# Patient Record
Sex: FEMALE | Race: WHITE | NOT HISPANIC OR LATINO | Employment: FULL TIME | URBAN - METROPOLITAN AREA
[De-identification: names, ages, dates, MRNs, and addresses within clinical notes are randomized per-mention and may not be internally consistent; named-entity substitution may affect disease eponyms.]

---

## 2019-08-13 ENCOUNTER — OFFICE VISIT (OUTPATIENT)
Dept: INTERNAL MEDICINE | Facility: CLINIC | Age: 49
End: 2019-08-13
Payer: COMMERCIAL

## 2019-08-13 ENCOUNTER — PATIENT MESSAGE (OUTPATIENT)
Dept: INTERNAL MEDICINE | Facility: CLINIC | Age: 49
End: 2019-08-13

## 2019-08-13 ENCOUNTER — HOSPITAL ENCOUNTER (OUTPATIENT)
Dept: RADIOLOGY | Facility: HOSPITAL | Age: 49
Discharge: HOME | End: 2019-08-13
Attending: NURSE PRACTITIONER
Payer: COMMERCIAL

## 2019-08-13 VITALS
DIASTOLIC BLOOD PRESSURE: 60 MMHG | OXYGEN SATURATION: 98 % | HEART RATE: 72 BPM | BODY MASS INDEX: 31.64 KG/M2 | SYSTOLIC BLOOD PRESSURE: 115 MMHG | HEIGHT: 63 IN | WEIGHT: 178.56 LBS

## 2019-08-13 DIAGNOSIS — S69.92XA INJURY OF LEFT HAND, INITIAL ENCOUNTER: ICD-10-CM

## 2019-08-13 DIAGNOSIS — M79.642 LEFT HAND PAIN: ICD-10-CM

## 2019-08-13 DIAGNOSIS — E66.09 CLASS 1 OBESITY DUE TO EXCESS CALORIES WITHOUT SERIOUS COMORBIDITY WITH BODY MASS INDEX (BMI) OF 30.0 TO 30.9 IN ADULT: ICD-10-CM

## 2019-08-13 DIAGNOSIS — Z00.00 ANNUAL PHYSICAL EXAM: Primary | ICD-10-CM

## 2019-08-13 DIAGNOSIS — I10 HYPERTENSION, UNSPECIFIED TYPE: ICD-10-CM

## 2019-08-13 DIAGNOSIS — S62.301A CLOSED DISPLACED FRACTURE OF SECOND METACARPAL BONE OF LEFT HAND, UNSPECIFIED PORTION OF METACARPAL, INITIAL ENCOUNTER: ICD-10-CM

## 2019-08-13 DIAGNOSIS — N80.9 ENDOMETRIOSIS: ICD-10-CM

## 2019-08-13 LAB
BILIRUB UR QL STRIP: NEGATIVE
CLARITY UR REFRACT.AUTO: CLEAR
COLOR UR AUTO: COLORLESS
GLUCOSE UR QL STRIP: NEGATIVE
HGB UR QL STRIP: NEGATIVE
KETONES UR QL STRIP: NEGATIVE
LEUKOCYTE ESTERASE UR QL STRIP: NEGATIVE
NITRITE UR QL STRIP: NEGATIVE
PH UR STRIP: >8 [PH] (ref 5–8)
PROT UR QL STRIP: NEGATIVE
SP GR UR STRIP: 1 (ref 1–1.03)
URN SPEC COLLECT METH UR: ABNORMAL

## 2019-08-13 PROCEDURE — 73130 X-RAY EXAM OF HAND: CPT | Mod: TC,LT

## 2019-08-13 PROCEDURE — 73130 XR HAND COMPLETE 3 VIEW LEFT: ICD-10-PCS | Mod: 26,LT,, | Performed by: RADIOLOGY

## 2019-08-13 PROCEDURE — 99386 PR PREVENTIVE VISIT,NEW,40-64: ICD-10-PCS | Mod: S$GLB,,, | Performed by: NURSE PRACTITIONER

## 2019-08-13 PROCEDURE — 81003 URINALYSIS AUTO W/O SCOPE: CPT

## 2019-08-13 PROCEDURE — 99999 PR PBB SHADOW E&M-EST. PATIENT-LVL IV: CPT | Mod: PBBFAC,,, | Performed by: NURSE PRACTITIONER

## 2019-08-13 PROCEDURE — 99386 PREV VISIT NEW AGE 40-64: CPT | Mod: S$GLB,,, | Performed by: NURSE PRACTITIONER

## 2019-08-13 PROCEDURE — 99999 PR PBB SHADOW E&M-EST. PATIENT-LVL IV: ICD-10-PCS | Mod: PBBFAC,,, | Performed by: NURSE PRACTITIONER

## 2019-08-13 PROCEDURE — 73130 X-RAY EXAM OF HAND: CPT | Mod: 26,LT,, | Performed by: RADIOLOGY

## 2019-08-13 RX ORDER — PROPRANOLOL HYDROCHLORIDE 40 MG/1
40 TABLET ORAL DAILY
Refills: 1 | COMMUNITY
Start: 2019-07-10 | End: 2019-08-13 | Stop reason: SDUPTHER

## 2019-08-13 RX ORDER — PROPRANOLOL HYDROCHLORIDE 40 MG/1
20 TABLET ORAL DAILY
Qty: 30 TABLET | Refills: 11 | Status: SHIPPED | OUTPATIENT
Start: 2019-08-13 | End: 2019-08-13 | Stop reason: SDUPTHER

## 2019-08-13 RX ORDER — PROPRANOLOL HYDROCHLORIDE 20 MG/1
20 TABLET ORAL DAILY
Qty: 30 TABLET | Refills: 11 | Status: SHIPPED | OUTPATIENT
Start: 2019-08-13 | End: 2020-08-18 | Stop reason: SDUPTHER

## 2019-08-13 RX ORDER — ESTRADIOL 1 MG/1
1 TABLET ORAL DAILY
Refills: 4 | COMMUNITY
Start: 2019-07-22 | End: 2022-11-09

## 2019-08-13 RX ORDER — ERGOCALCIFEROL 1.25 MG/1
50000 CAPSULE ORAL
Refills: 2 | COMMUNITY
Start: 2019-07-19 | End: 2019-10-14 | Stop reason: SDUPTHER

## 2019-08-13 RX ORDER — CALCIUM CARBONATE 600 MG
600 TABLET ORAL ONCE
COMMUNITY

## 2019-08-13 NOTE — PROGRESS NOTES
Internal Medicine Annual Exam       CHIEF COMPLAINT     The patient, Krystle Austin, who is a 48 y.o. female presents for an annual exam.    HPI   Will request records from Dr García's office - last PCP    Rc Cheng - takes 50,000 weekly. And oscal. Started by Dr García     HTN- dx 2 years ago on propranolol. No family history     Sleep- 6-8 hours   Stress- son with learning diabilities started new school.   Exercise- recently stopped 2/2 heat - walks   Diet- Good, trying to eat healthy. Stopped soft drinks and coffee     Headaches- every other day. Through out the day. Located band like across the forehead. No n/v or photo/phonophobia. Started diet 6/1/2019 has lost 23 lbs.     Fell this weekend- left hand pain. Fell onto outstretched hand. +pain in the left first metacarpal joint. +swelling. Full ROM.     HM-  MMG- has dense breasts, ordered q 6 months. Last performed 7/9/2019. Diagnostic imaging. Ordered by Dr Tapia - Gyn   Tdap- unsure will be in records           Past Medical History:  History reviewed. No pertinent past medical history.    Past Surgical History:   Procedure Laterality Date    HYSTERECTOMY  05/2008    TONSILLECTOMY  All I can remember is I was in 4th grade.        Family History   Problem Relation Age of Onset    Alcohol abuse Maternal Grandmother     Cancer Maternal Grandmother     Asthma Son     Cancer Mother     COPD Mother 61        lung     Learning disabilities Son     Miscarriages / Stillbirths Sister         Social History     Socioeconomic History    Marital status:      Spouse name: Not on file    Number of children: Not on file    Years of education: Not on file    Highest education level: Not on file   Occupational History    Not on file   Social Needs    Financial resource strain: Not hard at all    Food insecurity:     Worry: Never true     Inability: Never true    Transportation needs:     Medical: No     Non-medical: No   Tobacco Use    Smoking status: Never  Smoker    Smokeless tobacco: Never Used   Substance and Sexual Activity    Alcohol use: Not Currently     Frequency: Monthly or less     Drinks per session: 1 or 2     Binge frequency: Never     Comment: I probably have 2 drinks 3 times in a calendar year.    Drug use: Never    Sexual activity: Yes     Partners: Male     Birth control/protection: None   Lifestyle    Physical activity:     Days per week: 5 days     Minutes per session: 30 min    Stress: Not at all   Relationships    Social connections:     Talks on phone: More than three times a week     Gets together: Once a week     Attends Mu-ism service: Not on file     Active member of club or organization: Yes     Attends meetings of clubs or organizations: More than 4 times per year     Relationship status:    Other Topics Concern    Not on file   Social History Narrative    Not on file        Social History     Tobacco Use   Smoking Status Never Smoker   Smokeless Tobacco Never Used        Allergies as of 08/13/2019 - Reviewed 08/13/2019   Allergen Reaction Noted    Oxycodone-acetaminophen Hives 08/13/2019          Home Medications:  Prior to Admission medications    Medication Sig Start Date End Date Taking? Authorizing Provider   calcium carbonate (OS-STEFANIE) 600 mg calcium (1,500 mg) Tab Take 600 mg by mouth once.   Yes Historical Provider, MD   ergocalciferol (ERGOCALCIFEROL) 50,000 unit Cap Take 50,000 Units by mouth every 7 days. 7/19/19  Yes Historical Provider, MD   estradiol (ESTRACE) 1 MG tablet Take 1 mg by mouth once daily. 7/22/19  Yes Historical Provider, MD   levocetirizine dihydrochloride (XYZAL ORAL) Take by mouth.   Yes Historical Provider, MD   multivit-min/iron/folic/fdl726 (HAIR, SKIN AND NAILS ADVANCED ORAL) Take by mouth.   Yes Historical Provider, MD   multivitamin with minerals tablet Take 1 tablet by mouth once daily.   Yes Historical Provider, MD   propranolol (INDERAL) 40 MG tablet Take 40 mg by mouth once daily.  "7/10/19  Yes Historical Provider, MD       Review of Systems:  Review of Systems   Constitutional: Positive for activity change. Negative for unexpected weight change.   HENT: Negative for hearing loss, rhinorrhea and trouble swallowing.    Eyes: Negative for discharge and visual disturbance.   Respiratory: Negative for cough, chest tightness, shortness of breath and wheezing.    Cardiovascular: Negative for chest pain and palpitations.   Gastrointestinal: Negative for abdominal pain, blood in stool, constipation, diarrhea and vomiting.   Endocrine: Negative for polydipsia and polyuria.   Genitourinary: Negative for difficulty urinating, dysuria, hematuria and menstrual problem.   Musculoskeletal: Negative for arthralgias, joint swelling and neck pain.   Skin: Negative for rash.   Neurological: Negative for dizziness, weakness, light-headedness and headaches.   Psychiatric/Behavioral: Negative for confusion, dysphoric mood and sleep disturbance. The patient is nervous/anxious (son starting new school - was home schooled since 3/2019).        Health Maintainence:   Immunizations:  Health Maintenance       Date Due Completion Date    Lipid Panel 1970 ---    TETANUS VACCINE 11/29/1989 ---    Mammogram 11/29/2011 ---    Influenza Vaccine (1) 08/01/2019 ---           PHYSICAL EXAM     /70 (BP Location: Left arm, Patient Position: Sitting, BP Method: Medium (Manual))   Pulse 72   Ht 5' 3" (1.6 m)   Wt 81 kg (178 lb 9.2 oz)   SpO2 98%   BMI 31.63 kg/m²  Body mass index is 31.63 kg/m².    Physical Exam   Constitutional: She is oriented to person, place, and time. She appears well-developed and well-nourished.   HENT:   Head: Normocephalic.   Right Ear: External ear normal.   Left Ear: External ear normal.   Nose: Nose normal.   Mouth/Throat: Oropharynx is clear and moist. No oropharyngeal exudate.   Eyes: Pupils are equal, round, and reactive to light.   Neck: Neck supple. No JVD present. No tracheal " deviation present. No thyromegaly present.   Cardiovascular: Normal rate, regular rhythm, normal heart sounds and intact distal pulses. Exam reveals no gallop and no friction rub.   No murmur heard.  Pulmonary/Chest: Effort normal and breath sounds normal. No respiratory distress. She has no wheezes. She has no rales.   Abdominal: Soft. Bowel sounds are normal. She exhibits no distension. There is no tenderness.   Musculoskeletal: Normal range of motion. She exhibits no edema or tenderness.   Lymphadenopathy:     She has no cervical adenopathy.   Neurological: She is alert and oriented to person, place, and time.   Skin: Skin is warm and dry. No rash noted.   Psychiatric: She has a normal mood and affect. Her behavior is normal.   Vitals reviewed.      LABS     No results found for: LABA1C, HGBA1C  CMP  No results found for: NA, K, CL, CO2, GLU, BUN, CREATININE, CALCIUM, PROT, ALBUMIN, BILITOT, ALKPHOS, AST, ALT, ANIONGAP, ESTGFRAFRICA, EGFRNONAA  No results found for: WBC, HGB, HCT, MCV, PLT  No results found for: CHOL  No results found for: HDL  No results found for: LDLCALC  No results found for: TRIG  No results found for: CHOLHDL  No results found for: TSH, M7OHIUJ, V1ICYEB, THYROIDAB    ASSESSMENT/PLAN     Krystle Austin is a 48 y.o. female with  History reviewed. No pertinent past medical history.    Annual physical exam- all age and gender related screenings. Flu shot this fall.   -     CBC auto differential; Future; Expected date: 08/13/2019  -     Comprehensive metabolic panel; Future; Expected date: 08/13/2019  -     TSH; Future; Expected date: 08/13/2019  -     T4, free; Future; Expected date: 08/13/2019  -     Urinalysis  -     Vitamin D; Future; Expected date: 08/13/2019  -     Lipid panel; Future; Expected date: 08/13/2019    Hypertension, unspecified type- will decrease propranolol to 20mg. continue weight loss and exercise.   -     propranolol (INDERAL) 40 MG tablet; Take 0.5 tablets (20 mg total)  by mouth once daily.  Dispense: 30 tablet; Refill: 11    Endometriosis- stable. S/p total hysto     Injury of left hand, initial encounter- will send for images.   -     X-Ray Hand 3 View Left; Future; Expected date: 08/13/2019    Left hand pain  -     X-Ray Hand 3 View Left; Future; Expected date: 08/13/2019    Class 1 obesity due to excess calories without serious comorbidity with body mass index (BMI) of 30.0 to 30.9 in adult- cont weight loss with diet and exercise     Left partially displaced fracture of the 2nd metacarpal - will place in splint and refer to ortho.     Follow up with PCP in 4 weeks for BP check on new meds.     Caitlin WALKER, ANIL, FNP-c   Department of Internal Medicine - [facility]  7:58 AM

## 2019-08-16 ENCOUNTER — TELEPHONE (OUTPATIENT)
Dept: ORTHOPEDICS | Facility: CLINIC | Age: 49
End: 2019-08-16

## 2019-08-20 ENCOUNTER — OFFICE VISIT (OUTPATIENT)
Dept: ORTHOPEDICS | Facility: CLINIC | Age: 49
End: 2019-08-20
Payer: COMMERCIAL

## 2019-08-20 ENCOUNTER — DOCUMENTATION ONLY (OUTPATIENT)
Dept: ORTHOPEDICS | Facility: CLINIC | Age: 49
End: 2019-08-20

## 2019-08-20 ENCOUNTER — HOSPITAL ENCOUNTER (OUTPATIENT)
Dept: RADIOLOGY | Facility: OTHER | Age: 49
Discharge: HOME | End: 2019-08-20
Attending: PHYSICIAN ASSISTANT
Payer: COMMERCIAL

## 2019-08-20 VITALS
DIASTOLIC BLOOD PRESSURE: 74 MMHG | BODY MASS INDEX: 31.54 KG/M2 | HEIGHT: 63 IN | WEIGHT: 178 LBS | SYSTOLIC BLOOD PRESSURE: 118 MMHG | HEART RATE: 51 BPM

## 2019-08-20 DIAGNOSIS — S62.361A CLOSED NONDISPLACED FRACTURE OF NECK OF SECOND METACARPAL BONE OF LEFT HAND, INITIAL ENCOUNTER: Primary | ICD-10-CM

## 2019-08-20 DIAGNOSIS — S62.361A CLOSED NONDISPLACED FRACTURE OF NECK OF SECOND METACARPAL BONE OF LEFT HAND, INITIAL ENCOUNTER: ICD-10-CM

## 2019-08-20 PROCEDURE — 99203 OFFICE O/P NEW LOW 30 MIN: CPT | Mod: 25,S$GLB,, | Performed by: PHYSICIAN ASSISTANT

## 2019-08-20 PROCEDURE — 99999 PR PBB SHADOW E&M-EST. PATIENT-LVL IV: ICD-10-PCS | Mod: PBBFAC,,, | Performed by: PHYSICIAN ASSISTANT

## 2019-08-20 PROCEDURE — 73130 XR HAND COMPLETE 3 VIEW LEFT: ICD-10-PCS | Mod: 26,LT,, | Performed by: RADIOLOGY

## 2019-08-20 PROCEDURE — 99203 PR OFFICE/OUTPT VISIT, NEW, LEVL III, 30-44 MIN: ICD-10-PCS | Mod: 25,S$GLB,, | Performed by: PHYSICIAN ASSISTANT

## 2019-08-20 PROCEDURE — 3008F PR BODY MASS INDEX (BMI) DOCUMENTED: ICD-10-PCS | Mod: CPTII,S$GLB,, | Performed by: PHYSICIAN ASSISTANT

## 2019-08-20 PROCEDURE — 73130 X-RAY EXAM OF HAND: CPT | Mod: TC,FY,LT

## 2019-08-20 PROCEDURE — 73130 X-RAY EXAM OF HAND: CPT | Mod: 26,LT,, | Performed by: RADIOLOGY

## 2019-08-20 PROCEDURE — 29085 APPL CAST HAND&LWR FOREARM: CPT | Mod: LT,S$GLB,, | Performed by: PHYSICIAN ASSISTANT

## 2019-08-20 PROCEDURE — 99999 PR PBB SHADOW E&M-EST. PATIENT-LVL IV: CPT | Mod: PBBFAC,,, | Performed by: PHYSICIAN ASSISTANT

## 2019-08-20 PROCEDURE — 29085 PR APPLY HAND/WRIST CAST: ICD-10-PCS | Mod: LT,S$GLB,, | Performed by: PHYSICIAN ASSISTANT

## 2019-08-20 PROCEDURE — 3008F BODY MASS INDEX DOCD: CPT | Mod: CPTII,S$GLB,, | Performed by: PHYSICIAN ASSISTANT

## 2019-08-20 NOTE — PROGRESS NOTES
Subjective:      Patient ID: Krystle Austin is a 48 y.o. female.    Chief Complaint: Pain of the Left Hand      HPI  Krystle Austin is a right hand dominant 48 y.o. female presenting today for left hand pain.  There was a history of trauma- she was dancing at a reunion and fell back, landing on the left hand.  Onset of symptoms began 9 days ago.  She was seen by primary care last week, underwent left hand x-ray.  Fracture was noted of the 2nd metacarpal head, she was placed in a TKO radial gutter brace.  She reports that she has been wearing the brace regularly she does take it off for cooking, occasionally for typing at work, and for bathing and showering.  She reports that her pain is mild, improved with ibuprofen and ice.  She also elevates the hand regularly.  She denies any finger numbness or tingling.      Review of patient's allergies indicates:   Allergen Reactions    Oxycodone-acetaminophen Hives         Current Outpatient Medications   Medication Sig Dispense Refill    calcium carbonate (OS-STEFANIE) 600 mg calcium (1,500 mg) Tab Take 600 mg by mouth once.      ergocalciferol (ERGOCALCIFEROL) 50,000 unit Cap Take 50,000 Units by mouth every 7 days.  2    estradiol (ESTRACE) 1 MG tablet Take 1 mg by mouth once daily.  4    levocetirizine dihydrochloride (XYZAL ORAL) Take by mouth.      multivit-min/iron/folic/fqd580 (HAIR, SKIN AND NAILS ADVANCED ORAL) Take by mouth.      multivitamin with minerals tablet Take 1 tablet by mouth once daily.      propranolol (INDERAL) 20 MG tablet Take 1 tablet (20 mg total) by mouth once daily. 30 tablet 11     No current facility-administered medications for this visit.        Past Medical History:   Diagnosis Date    Endometriosis     Hypertension        Past Surgical History:   Procedure Laterality Date    HYSTERECTOMY  05/2008    TONSILLECTOMY  All I can remember is I was in 4th grade.         Review of Systems:  Review of Systems   Constitution: Negative for  "chills and fever.   Skin: Negative for rash and suspicious lesions.   Musculoskeletal:        See HPI   Neurological: Negative for dizziness, headaches, light-headedness, numbness and paresthesias.   Psychiatric/Behavioral: Negative for depression. The patient is not nervous/anxious.          OBJECTIVE:     PHYSICAL EXAM:  Height: 5' 3" (160 cm) Weight: 80.7 kg (178 lb)  Vitals:    08/20/19 0836   BP: 118/74   Pulse: (!) 51   Weight: 80.7 kg (178 lb)   Height: 5' 3" (1.6 m)   PainSc:   3     General    Vitals reviewed.  Constitutional: She is oriented to person, place, and time. She appears well-developed and well-nourished.   HENT:   Head: Normocephalic and atraumatic.   Neck: Normal range of motion.   Cardiovascular: Normal rate.    Pulmonary/Chest: Effort normal. No respiratory distress.   Neurological: She is alert and oriented to person, place, and time.   Psychiatric: She has a normal mood and affect. Her behavior is normal. Judgment and thought content normal.             Musculoskeletal:  Mild ecchymosis diffusely over the dorsal left hand.  No lacerations or abrasions.  She is nontender to palpation today, including over the fracture site. Good finger and wrist range of motion. Neurovascularly intact-good sensation and motor function, good capillary refill, 2+ radial pulses.    RADIOGRAPHS:  Left Hand XRay, 8/20/19  FINDINGS:  Healing fracture of the head of the 2nd metacarpal bone identified in a satisfactory position and alignment      Impression       See above     Left Hand XRay, 8/13/19  FINDINGS:  There is a minimally displaced fracture at the 2nd metacarpal head with associated soft tissue swelling.  No intra-articular extension.  The remaining osseous structures, soft tissues and joint spaces are normal.      Impression     Minimally displaced 2nd metacarpal head fracture.     Comments: I have personally reviewed the imaging and I agree with the above radiologist's report.    ASSESSMENT/PLAN: "   Krystle was seen today for pain.    Diagnoses and all orders for this visit:    Closed nondisplaced fracture of neck of second metacarpal bone of left hand, initial encounter  -     X-Ray Hand 3 View Left; Future           - We talked at length about the anatomy and pathophysiology of   Encounter Diagnosis   Name Primary?    Closed nondisplaced fracture of neck of second metacarpal bone of left hand, initial encounter Yes       - repeat hand x-ray today, reviewed and in stable position  - patient placed in a radial gutter fiberglass cast, IP position, left hand and forearm  - follow-up in 2 weeks for cast check and repeat x-ray  - no lifting or weight-bearing left hand  - Tylenol for mild pain, tramadol for moderate to severe pain  - continue icing and elevating.  - call with questions or concerns    Disclaimer: This note has been generated using voice-recognition software. There may be typographical errors that have been missed during proof-reading.

## 2019-08-20 NOTE — LETTER
August 20, 2019      Caitlin King, NP  [address]  [facility] [address]           [facility] 9 [facility] [address]  [address], [address]  [facility] [address]  Phone: [phone]          Patient: Krystle Austin   MR Number: [id number]   YOB: 1970   Date of Visit: 8/20/2019       Dear Caitlin King:    Thank you for referring Krystle Austin to me for evaluation. Attached you will find relevant portions of my assessment and plan of care.    If you have questions, please do not hesitate to call me. I look forward to following Krystle Austin along with you.    Sincerely,    GLYNN Nicole    Enclosure  CC:  No Recipients    If you would like to receive this communication electronically, please contact [email or web address] or [phone] to request more information on [employer] Link access.    For providers and/or their staff who would like to refer a patient to [facility], please contact us through our one-stop-shop provider referral line, [facility], at [phone].    If you feel you have received this communication in error or would no longer like to receive these types of communications, please e-mail [email or web address]

## 2019-08-27 ENCOUNTER — DOCUMENTATION ONLY (OUTPATIENT)
Dept: ORTHOPEDICS | Facility: CLINIC | Age: 49
End: 2019-08-27

## 2019-08-30 ENCOUNTER — TELEPHONE (OUTPATIENT)
Dept: ORTHOPEDICS | Facility: CLINIC | Age: 49
End: 2019-08-30

## 2019-09-04 ENCOUNTER — HOSPITAL ENCOUNTER (OUTPATIENT)
Dept: RADIOLOGY | Facility: OTHER | Age: 49
Discharge: HOME | End: 2019-09-04
Attending: PHYSICIAN ASSISTANT
Payer: COMMERCIAL

## 2019-09-04 ENCOUNTER — OFFICE VISIT (OUTPATIENT)
Dept: ORTHOPEDICS | Facility: CLINIC | Age: 49
End: 2019-09-04
Payer: COMMERCIAL

## 2019-09-04 VITALS
WEIGHT: 178 LBS | HEART RATE: 60 BPM | DIASTOLIC BLOOD PRESSURE: 79 MMHG | HEIGHT: 63 IN | BODY MASS INDEX: 31.54 KG/M2 | SYSTOLIC BLOOD PRESSURE: 122 MMHG

## 2019-09-04 DIAGNOSIS — S62.361A CLOSED NONDISPLACED FRACTURE OF NECK OF SECOND METACARPAL BONE OF LEFT HAND, INITIAL ENCOUNTER: Primary | ICD-10-CM

## 2019-09-04 DIAGNOSIS — S62.361A CLOSED NONDISPLACED FRACTURE OF NECK OF SECOND METACARPAL BONE OF LEFT HAND, INITIAL ENCOUNTER: ICD-10-CM

## 2019-09-04 PROCEDURE — 97760 PR ORTHOTIC MGMT&TRAINJ INITIAL ENC EA 15 MINS: ICD-10-PCS | Mod: S$GLB,,, | Performed by: PHYSICIAN ASSISTANT

## 2019-09-04 PROCEDURE — 3008F PR BODY MASS INDEX (BMI) DOCUMENTED: ICD-10-PCS | Mod: CPTII,S$GLB,, | Performed by: PHYSICIAN ASSISTANT

## 2019-09-04 PROCEDURE — 99999 PR PBB SHADOW E&M-EST. PATIENT-LVL IV: CPT | Mod: PBBFAC,,, | Performed by: PHYSICIAN ASSISTANT

## 2019-09-04 PROCEDURE — 73130 X-RAY EXAM OF HAND: CPT | Mod: TC,FY,LT

## 2019-09-04 PROCEDURE — 99999 PR PBB SHADOW E&M-EST. PATIENT-LVL IV: ICD-10-PCS | Mod: PBBFAC,,, | Performed by: PHYSICIAN ASSISTANT

## 2019-09-04 PROCEDURE — 73130 XR HAND COMPLETE 3 VIEW LEFT: ICD-10-PCS | Mod: 26,LT,, | Performed by: INTERNAL MEDICINE

## 2019-09-04 PROCEDURE — 3008F BODY MASS INDEX DOCD: CPT | Mod: CPTII,S$GLB,, | Performed by: PHYSICIAN ASSISTANT

## 2019-09-04 PROCEDURE — 99213 OFFICE O/P EST LOW 20 MIN: CPT | Mod: S$GLB,,, | Performed by: PHYSICIAN ASSISTANT

## 2019-09-04 PROCEDURE — 97760 ORTHOTIC MGMT&TRAING 1ST ENC: CPT | Mod: S$GLB,,, | Performed by: PHYSICIAN ASSISTANT

## 2019-09-04 PROCEDURE — 73130 X-RAY EXAM OF HAND: CPT | Mod: 26,LT,, | Performed by: INTERNAL MEDICINE

## 2019-09-04 PROCEDURE — 99213 PR OFFICE/OUTPT VISIT, EST, LEVL III, 20-29 MIN: ICD-10-PCS | Mod: S$GLB,,, | Performed by: PHYSICIAN ASSISTANT

## 2019-09-04 NOTE — PROGRESS NOTES
Subjective:      Patient ID: Krystle Austin is a 48 y.o. female.    Chief Complaint: Injury of the Left Hand      HPI  Krystle Austin is a right hand dominant 48 y.o. female presenting today for left 2nd metacarpal head fracture.  She did not tolerate the cast, reports claustrophobia especially at night.  The cast was removed today prior to x-ray.  She denies any significant pain in the left hand.    There was a history of trauma- she was dancing at a [employer] and fell back, landing on the left hand.  Trauma occurred 3.5 weeks ago.  She was seen by primary care and underwent left hand x-ray.  Fracture was noted of the 2nd metacarpal head, she was placed in a TKO radial gutter brace.  She denies any finger numbness or tingling.      Review of patient's allergies indicates:   Allergen Reactions    Oxycodone-acetaminophen Hives         Current Outpatient Medications   Medication Sig Dispense Refill    calcium carbonate (OS-STEFANIE) 600 mg calcium (1,500 mg) Tab Take 600 mg by mouth once.      ergocalciferol (ERGOCALCIFEROL) 50,000 unit Cap Take 50,000 Units by mouth every 7 days.  2    estradiol (ESTRACE) 1 MG tablet Take 1 mg by mouth once daily.  4    levocetirizine dihydrochloride (XYZAL ORAL) Take by mouth.      multivit-min/iron/folic/pbl407 (HAIR, SKIN AND NAILS ADVANCED ORAL) Take by mouth.      multivitamin with minerals tablet Take 1 tablet by mouth once daily.      propranolol (INDERAL) 20 MG tablet Take 1 tablet (20 mg total) by mouth once daily. 30 tablet 11     No current facility-administered medications for this visit.        Past Medical History:   Diagnosis Date    Endometriosis     Hypertension        Past Surgical History:   Procedure Laterality Date    HYSTERECTOMY  05/2008    TONSILLECTOMY  All I can remember is I was in 4th grade.         Review of Systems:  Review of Systems   Constitution: Negative for chills and fever.   Skin: Negative for rash and suspicious lesions.  "  Musculoskeletal:        See HPI   Neurological: Negative for dizziness, headaches, light-headedness, numbness and paresthesias.   Psychiatric/Behavioral: Negative for depression. The patient is not nervous/anxious.          OBJECTIVE:     PHYSICAL EXAM:  Height: 5' 3" (160 cm) Weight: 80.7 kg (178 lb)  Vitals:    09/04/19 1515   BP: 122/79   Pulse: 60   Weight: 80.7 kg (178 lb)   Height: 5' 3" (1.6 m)   PainSc: 0-No pain     General    Vitals reviewed.  Constitutional: She is oriented to person, place, and time. She appears well-developed and well-nourished.   HENT:   Head: Normocephalic and atraumatic.   Neck: Normal range of motion.   Cardiovascular: Normal rate.    Pulmonary/Chest: Effort normal. No respiratory distress.   Neurological: She is alert and oriented to person, place, and time.   Psychiatric: She has a normal mood and affect. Her behavior is normal. Judgment and thought content normal.             Musculoskeletal:  No ecchymosis , mild edema.  No lacerations or abrasions.  She is nontender to palpation today, including over the fracture site. Good finger and wrist range of motion. Neurovascularly intact-good sensation and motor function, good capillary refill, 2+ radial pulses.    RADIOGRAPHS:  Left Hand XRay, 8/20/19  FINDINGS:  There is a healing fracture at the head of the 2nd metacarpal, without a significant interval change compared to the patient's previous examination.  Surrounding osseous structures are unremarkable.      Impression       As above       Comments: I have personally reviewed the imaging and I agree with the above radiologist's report.    ASSESSMENT/PLAN:   Krystle was seen today for injury.    Diagnoses and all orders for this visit:    Closed nondisplaced fracture of neck of second metacarpal bone of left hand, initial encounter  -     X-Ray Hand 3 View Left; Future  -     Cancel: Ambulatory Referral to Physical/Occupational Therapy  -     Ambulatory Referral to " Physical/Occupational Therapy           - We talked at length about the anatomy and pathophysiology of   Encounter Diagnosis   Name Primary?    Closed nondisplaced fracture of neck of second metacarpal bone of left hand, initial encounter Yes       - repeat hand x-ray today, reviewed and in stable position  - patient placed in a radial gutter Exos brace, IP position (15 minutes spent preparing, fitting, and educating on brace).  - follow-up in 3 weeks for recheck and repeat x-ray  - no lifting or weight-bearing left hand  - Orders for OT  - Tylenol for mild pain, tramadol for moderate to severe pain  - continue icing and elevating.  - call with questions or concerns    Disclaimer: This note has been generated using voice-recognition software. There may be typographical errors that have been missed during proof-reading.

## 2019-09-04 NOTE — LETTER
September 4, 2019      Caitlin King, NP  [address]  [facility] [address]           [facility] 9 [facility] [address]  [address], [address]  [facility] [address]  Phone: [phone]          Patient: Krystle Austin   MR Number: [id number]   YOB: 1970   Date of Visit: 9/4/2019       Dear Caitlin King:    Thank you for referring Krystle Austin to me for evaluation. Attached you will find relevant portions of my assessment and plan of care.    If you have questions, please do not hesitate to call me. I look forward to following Krystle Austin along with you.    Sincerely,    GLYNN Nicole    Enclosure  CC:  No Recipients    If you would like to receive this communication electronically, please contact [email or web address] or [phone] to request more information on [employer] Link access.    For providers and/or their staff who would like to refer a patient to [facility], please contact us through our one-stop-shop provider referral line, [facility], at [phone].    If you feel you have received this communication in error or would no longer like to receive these types of communications, please e-mail [email or web address]

## 2019-09-10 ENCOUNTER — OFFICE VISIT (OUTPATIENT)
Dept: INTERNAL MEDICINE | Facility: CLINIC | Age: 49
End: 2019-09-10
Payer: COMMERCIAL

## 2019-09-10 VITALS
SYSTOLIC BLOOD PRESSURE: 116 MMHG | HEART RATE: 56 BPM | WEIGHT: 174.63 LBS | HEIGHT: 63 IN | DIASTOLIC BLOOD PRESSURE: 72 MMHG | OXYGEN SATURATION: 98 % | BODY MASS INDEX: 30.94 KG/M2

## 2019-09-10 DIAGNOSIS — I10 HYPERTENSION, UNSPECIFIED TYPE: Primary | ICD-10-CM

## 2019-09-10 DIAGNOSIS — R45.4 IRRITABILITY: ICD-10-CM

## 2019-09-10 DIAGNOSIS — S62.361A CLOSED NONDISPLACED FRACTURE OF NECK OF SECOND METACARPAL BONE OF LEFT HAND, INITIAL ENCOUNTER: ICD-10-CM

## 2019-09-10 PROCEDURE — 3008F BODY MASS INDEX DOCD: CPT | Mod: CPTII,S$GLB,, | Performed by: NURSE PRACTITIONER

## 2019-09-10 PROCEDURE — 3008F PR BODY MASS INDEX (BMI) DOCUMENTED: ICD-10-PCS | Mod: CPTII,S$GLB,, | Performed by: NURSE PRACTITIONER

## 2019-09-10 PROCEDURE — 99999 PR PBB SHADOW E&M-EST. PATIENT-LVL III: CPT | Mod: PBBFAC,,, | Performed by: NURSE PRACTITIONER

## 2019-09-10 PROCEDURE — 99213 PR OFFICE/OUTPT VISIT, EST, LEVL III, 20-29 MIN: ICD-10-PCS | Mod: S$GLB,,, | Performed by: NURSE PRACTITIONER

## 2019-09-10 PROCEDURE — 99999 PR PBB SHADOW E&M-EST. PATIENT-LVL III: ICD-10-PCS | Mod: PBBFAC,,, | Performed by: NURSE PRACTITIONER

## 2019-09-10 PROCEDURE — 99213 OFFICE O/P EST LOW 20 MIN: CPT | Mod: S$GLB,,, | Performed by: NURSE PRACTITIONER

## 2019-09-10 NOTE — PROGRESS NOTES
INTERNAL MEDICINE PROGRESS NOTE    CHIEF COMPLAINT     Chief Complaint   Patient presents with    Follow-up     Bp and irritation       HPI     Krystle Austin is a 48 y.o. female who presents for a follow up visit today.    HTN- controlled with propanolol 20 mg. No issues.   +headaches - usually at the end of the day when looking at the computer screen.     Left 2nd metacarpal fracture- nondisplaced. Was in cast x2 weeks. Now in splint. F/u with hand surgery on 9/25/19 and starts OT on 9/19/19.   Mild pain intermittent    Irritation- reports short tempered with family. No depressive or anxiety s/s.   Is on estrace from Gyn post-hsto.     Hypertension  Chronicity: recurrent  Onset: more than 1 year ago  Progression since onset: rapidly improving  Condition status: controlled  anxiety: No  blurred vision: No  malaise/fatigue: No  orthopnea: No  peripheral edema: No  PND: No  sweats: No  Agents associated with hypertension: no associated agents  CAD risks: obesity, post-menopausal state, stress  Compliance problems: diet, exercise  Past treatments: diuretics, lifestyle changes  Improvement on treatment: significant      Past Medical History:  Past Medical History:   Diagnosis Date    Endometriosis     Hypertension        Home Medications:  Prior to Admission medications    Medication Sig Start Date End Date Taking? Authorizing Provider   calcium carbonate (OS-STEFANIE) 600 mg calcium (1,500 mg) Tab Take 600 mg by mouth once.   Yes Historical Provider, MD   ergocalciferol (ERGOCALCIFEROL) 50,000 unit Cap Take 50,000 Units by mouth every 7 days. 7/19/19  Yes Historical Provider, MD   estradiol (ESTRACE) 1 MG tablet Take 1 mg by mouth once daily. 7/22/19  Yes Historical Provider, MD   levocetirizine dihydrochloride (XYZAL ORAL) Take by mouth.   Yes Historical Provider, MD   multivit-min/iron/folic/paz176 (HAIR, SKIN AND NAILS ADVANCED ORAL) Take by mouth.   Yes Historical Provider, MD   multivitamin with minerals tablet  "Take 1 tablet by mouth once daily.   Yes Historical Provider, MD   propranolol (INDERAL) 20 MG tablet Take 1 tablet (20 mg total) by mouth once daily. 8/13/19  Yes Caitlin King NP       Review of Systems:  Review of Systems   Respiratory: Negative for shortness of breath.    Cardiovascular: Negative for chest pain and palpitations.   Musculoskeletal: Negative for neck pain.   Neurological: Positive for headaches.       Health Maintainence:   Immunizations:  Health Maintenance       Date Due Completion Date    TETANUS VACCINE 11/29/1989 ---    Influenza Vaccine (1) 09/01/2019 ---    Mammogram 07/09/2021 7/9/2019 (Done)    Override on 7/9/2019: Done    Lipid Panel 08/13/2024 8/13/2019           PHYSICAL EXAM     /72 (BP Location: Left arm, Patient Position: Sitting, BP Method: Large (Manual))   Pulse (!) 56   Ht 5' 3" (1.6 m)   Wt 79.2 kg (174 lb 9.7 oz)   SpO2 98%   BMI 30.93 kg/m²     Physical Exam   Constitutional: She is oriented to person, place, and time. She appears well-developed and well-nourished.   HENT:   Head: Normocephalic and atraumatic.   Eyes: Pupils are equal, round, and reactive to light.   Cardiovascular: Normal rate and regular rhythm.   Pulmonary/Chest: Effort normal.   Musculoskeletal:        Hands:  Neurological: She is alert and oriented to person, place, and time.   Psychiatric: She has a normal mood and affect.       LABS     No results found for: LABA1C, HGBA1C  CMP  Sodium   Date Value Ref Range Status   08/13/2019 142 136 - 145 mmol/L Final     Potassium   Date Value Ref Range Status   08/13/2019 4.2 3.5 - 5.1 mmol/L Final     Chloride   Date Value Ref Range Status   08/13/2019 105 95 - 110 mmol/L Final     CO2   Date Value Ref Range Status   08/13/2019 27 23 - 29 mmol/L Final     Glucose   Date Value Ref Range Status   08/13/2019 98 70 - 110 mg/dL Final     BUN, Bld   Date Value Ref Range Status   08/13/2019 11 6 - 20 mg/dL Final     Creatinine   Date Value Ref Range " Status   08/13/2019 0.7 0.5 - 1.4 mg/dL Final     Calcium   Date Value Ref Range Status   08/13/2019 9.4 8.7 - 10.5 mg/dL Final     Total Protein   Date Value Ref Range Status   08/13/2019 7.4 6.0 - 8.4 g/dL Final     Albumin   Date Value Ref Range Status   08/13/2019 3.9 3.5 - 5.2 g/dL Final     Total Bilirubin   Date Value Ref Range Status   08/13/2019 0.5 0.1 - 1.0 mg/dL Final     Comment:     For infants and newborns, interpretation of results should be based  on gestational age, weight and in agreement with clinical  observations.  Premature Infant recommended reference ranges:  Up to 24 hours.............<8.0 mg/dL  Up to 48 hours............<12.0 mg/dL  3-5 days..................<15.0 mg/dL  6-29 days.................<15.0 mg/dL       Alkaline Phosphatase   Date Value Ref Range Status   08/13/2019 65 55 - 135 U/L Final     AST   Date Value Ref Range Status   08/13/2019 19 10 - 40 U/L Final     ALT   Date Value Ref Range Status   08/13/2019 31 10 - 44 U/L Final     Anion Gap   Date Value Ref Range Status   08/13/2019 10 8 - 16 mmol/L Final     eGFR if    Date Value Ref Range Status   08/13/2019 >60 >60 mL/min/1.73 m^2 Final     eGFR if non    Date Value Ref Range Status   08/13/2019 >60 >60 mL/min/1.73 m^2 Final     Comment:     Calculation used to obtain the estimated glomerular filtration  rate (eGFR) is the CKD-EPI equation.        Lab Results   Component Value Date    WBC 6.02 08/13/2019    HGB 13.6 08/13/2019    HCT 41.5 08/13/2019    MCV 86 08/13/2019     08/13/2019     Lab Results   Component Value Date    CHOL 155 08/13/2019     Lab Results   Component Value Date    HDL 51 08/13/2019     Lab Results   Component Value Date    LDLCALC 86.0 08/13/2019     Lab Results   Component Value Date    TRIG 90 08/13/2019     Lab Results   Component Value Date    CHOLHDL 32.9 08/13/2019     Lab Results   Component Value Date    TSH 1.357 08/13/2019       ASSESSMENT/PLAN      Krystle Austin is a 48 y.o. female with  Past Medical History:   Diagnosis Date    Endometriosis     Hypertension      Hypertension, unspecified type- controlled. Cont current meds. Low na diet     Closed nondisplaced fracture of neck of second metacarpal bone of left hand, initial encounter- stable. Cont use of splint. Will f/u with ortho and OT.     Irritability- -depression screening. Will check hormone panel with Gyn and follow up     Follow up with PCP     Patient education provided from [facility]. Patient was counseled on when and how to seek emergent care.       Caitlin WALKER, ANIL, FNP-c   Department of Internal Medicine - [facility]  7:57 AM

## 2019-09-19 ENCOUNTER — CLINICAL SUPPORT (OUTPATIENT)
Dept: REHABILITATION | Facility: HOSPITAL | Age: 49
End: 2019-09-19
Attending: PHYSICIAN ASSISTANT
Payer: COMMERCIAL

## 2019-09-19 DIAGNOSIS — M25.642 STIFFNESS OF LEFT HAND, NOT ELSEWHERE CLASSIFIED: ICD-10-CM

## 2019-09-19 PROCEDURE — 97165 OT EVAL LOW COMPLEX 30 MIN: CPT | Mod: PO

## 2019-09-19 NOTE — PATIENT INSTRUCTIONS
Active Wrist Flex/Ext with a Loose Fist        Make a loose fist and actively bend wrist forward. Hold 2-3 seconds. Keeping your loose fist, bend your wrist back and hold for 2-3 seconds.  Repeat ____ times per set. Do ____ sets per session. Do ____ sessions per day.    Copyright © [facility]. All rights reserved.     Tendon Glides        Start at position A and move through each position slowly attempting to achieve full glide.  A-E is ONE repetition.       PIP Flexion (Active Blocked)        Hold large knuckle straight using other hand. Bend middle joint of finger as far as possible.        DIP Flexion (Active Blocked)        Hold finger firmly at the middle so that only the tip joint can bend.  .       With hand flat on table, spread all fingers apart, then bring them together as close as possible.  Repeat ____ times. Do ____ sessions per day.    Copyright © [employer]. All rights reserved.     Finger Lifts from Table (Active MP Extension)        With palm on table, straighten fingers completely at large knuckles one knuckle at a time, and lift fingers off table. Hold 1-2 seconds.

## 2019-09-19 NOTE — PLAN OF CARE
[facility] Occupational Therapy  Initial Evaluation     Date: 9/19/2019  Name: Krystle Austin  Clinic Number: [id number]    Therapy Diagnosis:   Encounter Diagnosis   Name Primary?    Stiffness of left hand, not elsewhere classified      Physician: Bridgett Tabor PA    Physician Orders: Non-Op treatment 2nd metacarpal head fx Start OT in 2 weeks Eval and Treat, modalities as needed 1-2 times/week for 6+ weeks  Medical Diagnosis: S62.361A (ICD-10-CM) - Closed nondisplaced fracture of neck of second metacarpal bone of left hand, initial encounter  Surgical Procedure and Date: N/a  Evaluation Date: 9/19/2019  Insurance Authorization Period Expiration: 9/3/19  Plan of Care Certification Period: 11/15/19  Date of Return to MD: 9/25/19    Visit # / Visits authorized: 1/ 1   Time In:7AM  Time Out: 7:40AM  Total Billable Time: 40 minutes    Precautions:  Standard    Subjective     Involved Side: Left  Dominant Side: Right  Date of Onset: 8/10/19  History of Current Condition/Mechanism of Injury: Pt referred to OT s/p L 2nd MC head fx. She reports she injured her had during a fall last month. She was initially casted but transitioned to an Exos radial gutter brace last week. She is now 5 weeks 5 days post injury.  Imaging: X-ray on 8/20/19:  There is a healing fracture at the head of the 2nd metacarpal, without a significant interval change compared to the patient's previous examination.  Surrounding osseous structures are unremarkable.   Previous Therapy: No    Past Medical History/Physical Systems Review:   Krystle Austin  has a past medical history of Endometriosis and Hypertension.    Krystle Austin  has a past surgical history that includes Tonsillectomy (All I can remember is I was in 4th grade.) and Hysterectomy (05/2008).    Krystle has a current medication list which includes the following prescription(s): calcium carbonate, ergocalciferol, estradiol, levocetirizine dihydrochloride,  "multivit-min/iron/folic/mrf118, multivitamin with minerals, and propranolol.    Review of patient's allergies indicates:   Allergen Reactions    Oxycodone-acetaminophen Hives        Patient's Goals for Therapy: "To get out of the splint."    Pain:  Functional Pain Scale Rating 0-10:   1/10 on average  Location: L IF  Description: Dull    Occupation:  Marketing  Duties: Computer work    Functional Limitations/Social History:    Previous functional status includes: Independent with all ADLs.     Current FunctionalStatus   Home/Living environment : lives with their family      Limitation of Functional Status as follows:    ADLs: Independent    IADLs: Cooking, Folding laundry    Objective     Observation: Pt presents wearing a radial gutter Exos brace. Upon removal, distal phalanx of index and middle fingers deviate slightly ulnarly; patient reports this is from an old injury. Edema is minimal.    LEFT Hand AROM Measured in degrees   9/19/19   Wrist Ext/Flex 55/74   Index Finger: MP 55                         PIP 85                         DIP 54                    TROM 194   Middle Finger: MP 77                          PIP 90                          DIP 74                     TROM 241     CMS Impairment/Limitation/Restriction for FOTO Hand Survey    Therapist reviewed FOTO scores for Krystle Austin on 9/19/2019.   FOTO documents entered into [employer] by [employer] - see Media section.    Limitation Score: 16%  Category: Carrying    Current : 16%  Goal: 12%     Treatment     Treatment Time In: 7:30AM  Treatment Time Out: 7:40AM  Total Treatment time separate from Evaluation time:10 minutes    Krystle Austin received therapeutic exercises for 10 minutes including:  -Initial HEP instruction and demonstration    Home Exercise Program/Education:  Issued HEP (see patient instructions in EMR) and educated on modality use for pain management . Exercises were reviewed and Krystle Austin was able to demonstrate them prior to the end of the " session.   Pt received a written copy of exercises to perform at home. Krystle Austin demonstrated good  understanding of the education provided.  Pt was advised to perform these exercises free of pain, and to stop performing them if pain occurs.    Patient/Family Education: role of OT, goals for OT, scheduling/cancellations - pt verbalized understanding. Discussed insurance limitations with patient.    Additional Education provided: Wean from brace while at home    Assessment     Krystle Austin is a 48 y.o. female referred to outpatient occupational therapy s/p L 2nd MC fracture resulting in Decreased ROM, Decreased functional hand use and Joint Stiffness and demonstrates limitations as described in the chart below. Following medical record review it is determined that pt will benefit from occupational therapy services in order to maximize pain free and/or functional use of left UE. The following goals were discussed with the patient and patient is in agreement with them as to be addressed in the treatment plan. The patient's rehab potential is Excellent.     Anticipated barriers to occupational therapy: Not at this time  Pt has no cultural, educational or language barriers to learning provided.    Profile and History Assessment of Occupational Performance Level of Clinical Decision Making Complexity Score   Occupational Profile:   Krystle Austin is a 48 y.o. 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 Krystle Austin has difficulty with  ADLs and IADLs as listed previously, which  affecting his/her daily functional abilities.      Comorbidities:    has a past medical history of Endometriosis and Hypertension.    Medical and Therapy History Review:   Brief Performance Deficits    Physical:  Joint Mobility  Joint Stability    Cognitive:  No Deficits    Psychosocial:    No Deficits     Clinical Decision Making:  low    Assessment Process:  Problem-Focused  Assessments    Modification/Need for Assistance:  Not Necessary    Intervention Selection:  Limited Treatment Options       low  Based on PMHX, co morbidities , data from assessments and functional level of assistance required with task and clinical presentation directly impacting function.       The following goals were discussed with the patient and patient is in agreement with them as to be addressed in the treatment plan.     Short Term Goals:   To be completed in 1 week:  1) Patient will be independent in HEP   To be completed in 2-4 weeks:  2) Increase TROM of index finger by 15 degrees for IADL performance  3) Increase index and middle finger AROM to WNL compared to RUE for improved functional performance  4) Assess /pinch strength and set appropriate goals  Long Term Goals:   To be completed by discharge:  1) Patient will report folding 1 basket of laundry independently without subjective report of increased pain  2) Decrease FOTO limitation score to 12%, indicative of improved functional independence      Plan   Certification Period/Plan of care expiration: 9/19/2019 to 11/15/19.    Outpatient Occupational Therapy 1 times weekly for 8 visits to include the following interventions: Paraffin, Fluidotherapy, Manual therapy/joint mobilizations, Modalities for pain management, Therapeutic exercises/activities., Strengthening and Joint Protection.      Ruchi Buckner, OT

## 2019-09-24 ENCOUNTER — CLINICAL SUPPORT (OUTPATIENT)
Dept: REHABILITATION | Facility: HOSPITAL | Age: 49
End: 2019-09-24
Attending: PHYSICIAN ASSISTANT
Payer: COMMERCIAL

## 2019-09-24 ENCOUNTER — TELEPHONE (OUTPATIENT)
Dept: ORTHOPEDICS | Facility: CLINIC | Age: 49
End: 2019-09-24

## 2019-09-24 DIAGNOSIS — M25.642 STIFFNESS OF LEFT HAND, NOT ELSEWHERE CLASSIFIED: ICD-10-CM

## 2019-09-24 PROCEDURE — 97110 THERAPEUTIC EXERCISES: CPT | Mod: PO

## 2019-09-24 PROCEDURE — 97018 PARAFFIN BATH THERAPY: CPT | Mod: PO

## 2019-09-24 NOTE — PROGRESS NOTES
Occupational Therapy Daily Treatment Note     Date: 9/24/2019  Name: Krystle Austin  Clinic Number: [id number]    Therapy Diagnosis:   Encounter Diagnosis   Name Primary?    Stiffness of left hand, not elsewhere classified      Physician: Bridgett Tabor PA    Physician Orders: Non-Op treatment 2nd metacarpal head fx Start OT in 2 weeks Eval and Treat, modalities as needed 1-2 times/week for 6+ weeks  Medical Diagnosis: S62.361A (ICD-10-CM) - Closed nondisplaced fracture of neck of second metacarpal bone of left hand, initial encounter  Surgical Procedure and Date: N/a  Evaluation Date: 9/19/2019  Insurance Authorization Period Expiration: 9/3/19  Plan of Care Certification Period: 12/31/19  Date of Return to MD: 9/25/19     Visit # / Visits authorized: 2/ 40   Time In:8AM  Time Out: 8:45AM  Total Billable Time: 45 minutes     Precautions:  Standard    Subjective     Patient reports compliance with HEP    Pain: 1/10  Location: L Index finger    Objective     LEFT Hand AROM Measured in degrees    9/19/19   Wrist Ext/Flex 55/74   Index Finger: MP 55                         PIP 85                         DIP 54                    TROM 194   Middle Finger: MP 77                          PIP 90                          DIP 74                     TROM 241     Krystle Austin received the following supervised modalities after being cleared for contradictions for 12 minutes:   -Patient received paraffin bath with MHP to left hand to increase blood flow, circulation, pain management and for tissue elasticity prior to therex.     Krystle Austin received therapeutic exercises for 33 minutes including:  -A/AAROM index/middle finger flexion, isolated MP then composite  -TGEs x 10  -Finger abd/add  -edema/soft tissue massage x 3'  -Isolated FDS x 10  -EDC glides with highlighter x 2'  -Digi extend yellow band x 20  -Isolated IF abduction with 2 sec hold, x 8  -Yellow putty molding x 2'  -Coban applied to index finger post tx for  swelling    Home Exercises and Education Provided     Education provided: Reviewed HEP    Written Home Exercises Provided: Yes  Exercises were reviewed and Krystle Austin was able to demonstrate them prior to the end of the session.  Krystle Austin demonstrated good  understanding of the HEP provided.   .   See EMR under Patient Instructions for exercises provided during initial evaluation.     Assessment     Patient progressing very well with ROM. Slight stiffness with finger abduction, which improved post treatment.     Krystle Austin is progressing well towards her goals and there are no updates to goals at this time. Pt prognosis is Excellent.     Pt will continue to benefit from skilled outpatient occupational therapy to address the deficits listed in the problem list on initial evaluation provide pt/family education and to maximize pt's level of independence in the home and community environment.     Pt's spiritual, cultural and educational needs considered and pt agreeable to plan of care and goals.      Short Term Goals:   To be completed in 1 week:  1) Patient will be independent in HEP --Ongoing  To be completed in 2-4 weeks:  2) Increase TROM of index finger by 15 degrees for IADL performance --Progressing  3) Increase index and middle finger AROM to WNL compared to RUE for improved functional performance --Progressing  4) Assess /pinch strength and set appropriate goals  Long Term Goals:   To be completed by discharge:  1) Patient will report folding 1 basket of laundry independently without subjective report of increased pain  2) Decrease FOTO limitation score to 12%, indicative of improved functional independence    Plan   Outpatient Occupational Therapy 1 times weekly for 8 visits     Updates/Grading for next session:       Ruchi Buckner, OT

## 2019-09-25 ENCOUNTER — OFFICE VISIT (OUTPATIENT)
Dept: ORTHOPEDICS | Facility: CLINIC | Age: 49
End: 2019-09-25
Payer: COMMERCIAL

## 2019-09-25 ENCOUNTER — HOSPITAL ENCOUNTER (OUTPATIENT)
Dept: RADIOLOGY | Facility: OTHER | Age: 49
Discharge: HOME | End: 2019-09-25
Attending: PHYSICIAN ASSISTANT
Payer: COMMERCIAL

## 2019-09-25 VITALS
BODY MASS INDEX: 30.83 KG/M2 | HEART RATE: 54 BPM | DIASTOLIC BLOOD PRESSURE: 85 MMHG | SYSTOLIC BLOOD PRESSURE: 125 MMHG | HEIGHT: 63 IN | WEIGHT: 174 LBS

## 2019-09-25 DIAGNOSIS — S62.361A CLOSED NONDISPLACED FRACTURE OF NECK OF SECOND METACARPAL BONE OF LEFT HAND, INITIAL ENCOUNTER: Primary | ICD-10-CM

## 2019-09-25 DIAGNOSIS — S62.361A CLOSED NONDISPLACED FRACTURE OF NECK OF SECOND METACARPAL BONE OF LEFT HAND, INITIAL ENCOUNTER: ICD-10-CM

## 2019-09-25 PROCEDURE — 73130 X-RAY EXAM OF HAND: CPT | Mod: TC,FY,LT

## 2019-09-25 PROCEDURE — 3008F PR BODY MASS INDEX (BMI) DOCUMENTED: ICD-10-PCS | Mod: CPTII,S$GLB,, | Performed by: PHYSICIAN ASSISTANT

## 2019-09-25 PROCEDURE — 99999 PR PBB SHADOW E&M-EST. PATIENT-LVL III: ICD-10-PCS | Mod: PBBFAC,,, | Performed by: PHYSICIAN ASSISTANT

## 2019-09-25 PROCEDURE — 73130 XR HAND COMPLETE 3 VIEW LEFT: ICD-10-PCS | Mod: 26,LT,, | Performed by: RADIOLOGY

## 2019-09-25 PROCEDURE — 99999 PR PBB SHADOW E&M-EST. PATIENT-LVL III: CPT | Mod: PBBFAC,,, | Performed by: PHYSICIAN ASSISTANT

## 2019-09-25 PROCEDURE — 3008F BODY MASS INDEX DOCD: CPT | Mod: CPTII,S$GLB,, | Performed by: PHYSICIAN ASSISTANT

## 2019-09-25 PROCEDURE — 99213 OFFICE O/P EST LOW 20 MIN: CPT | Mod: S$GLB,,, | Performed by: PHYSICIAN ASSISTANT

## 2019-09-25 PROCEDURE — 73130 X-RAY EXAM OF HAND: CPT | Mod: 26,LT,, | Performed by: RADIOLOGY

## 2019-09-25 PROCEDURE — 99213 PR OFFICE/OUTPT VISIT, EST, LEVL III, 20-29 MIN: ICD-10-PCS | Mod: S$GLB,,, | Performed by: PHYSICIAN ASSISTANT

## 2019-09-25 RX ORDER — SERTRALINE HYDROCHLORIDE 50 MG/1
50 TABLET, FILM COATED ORAL DAILY
COMMUNITY
End: 2020-10-14 | Stop reason: SDUPTHER

## 2019-09-25 NOTE — PROGRESS NOTES
Subjective:      Patient ID: Krystle Austin is a 48 y.o. female.    Chief Complaint: Pain of the Left Hand      HPI  Krystle Austin is a right hand dominant 48 y.o. female presenting today for left 2nd metacarpal head fracture.  She has been in an Exos radial gutter brace, tolerating it well.  She has started OT and HEP, tolerating it well.  No current pain.    There was a history of trauma- she was dancing at a [employer] and fell back, landing on the left hand.  Trauma occurred 6.5 weeks ago.  She was seen by primary care and underwent left hand x-ray.  Fracture was noted of the 2nd metacarpal head, she was placed in a TKO radial gutter brace.  We transitioned her to a radial gutter cast which she did not tolerate, then transitioned to an Exos brace. She denies any finger numbness or tingling.      Review of patient's allergies indicates:   Allergen Reactions    Oxycodone-acetaminophen Hives         Current Outpatient Medications   Medication Sig Dispense Refill    calcium carbonate (OS-STEFANIE) 600 mg calcium (1,500 mg) Tab Take 600 mg by mouth once.      ergocalciferol (ERGOCALCIFEROL) 50,000 unit Cap Take 50,000 Units by mouth every 7 days.  2    estradiol (ESTRACE) 1 MG tablet Take 1 mg by mouth once daily.  4    levocetirizine dihydrochloride (XYZAL ORAL) Take by mouth.      multivit-min/iron/folic/tni989 (HAIR, SKIN AND NAILS ADVANCED ORAL) Take by mouth.      multivitamin with minerals tablet Take 1 tablet by mouth once daily.      propranolol (INDERAL) 20 MG tablet Take 1 tablet (20 mg total) by mouth once daily. 30 tablet 11    sertraline (ZOLOFT) 50 MG tablet Take 50 mg by mouth once daily.       No current facility-administered medications for this visit.        Past Medical History:   Diagnosis Date    Endometriosis     Hypertension        Past Surgical History:   Procedure Laterality Date    HYSTERECTOMY  05/2008    TONSILLECTOMY  All I can remember is I was in 4th grade.         Review of  "Systems:  Review of Systems   Constitution: Negative for chills and fever.   Skin: Negative for rash and suspicious lesions.   Musculoskeletal:        See HPI   Neurological: Negative for dizziness, headaches, light-headedness, numbness and paresthesias.   Psychiatric/Behavioral: Negative for depression. The patient is not nervous/anxious.          OBJECTIVE:     PHYSICAL EXAM:  Height: 5' 3" (160 cm) Weight: 78.9 kg (174 lb)  Vitals:    09/25/19 0900   BP: 125/85   Pulse: (!) 54   Weight: 78.9 kg (174 lb)   Height: 5' 3" (1.6 m)   PainSc: 0-No pain     General    Vitals reviewed.  Constitutional: She is oriented to person, place, and time. She appears well-developed and well-nourished.   HENT:   Head: Normocephalic and atraumatic.   Neck: Normal range of motion.   Cardiovascular: Normal rate.    Pulmonary/Chest: Effort normal. No respiratory distress.   Neurological: She is alert and oriented to person, place, and time.   Psychiatric: She has a normal mood and affect. Her behavior is normal. Judgment and thought content normal.             Musculoskeletal:  No ecchymosis , mild edema.  No lacerations or abrasions.  She is nontender to palpation today, including over the fracture site. Good, improving finger and wrist range of motion. Neurovascularly intact-good sensation and motor function, good capillary refill, 2+ radial pulses.    RADIOGRAPHS:  Left Hand XRay, 9/25/19  FINDINGS:  Prior 2nd metacarpal head fracture, stable.  No new fracture identified.  No marrow replacement process.  Alignment within normal limits.  First CMC joint degenerative changes.      Impression       No significant changes.       Comments: I have personally reviewed the imaging and I agree with the above radiologist's report. Healing well.    ASSESSMENT/PLAN:   Krystle was seen today for pain.    Diagnoses and all orders for this visit:    Closed nondisplaced fracture of neck of second metacarpal bone of left hand, initial encounter  -    "  X-Ray Hand 3 View Left; Future           - We talked at length about the anatomy and pathophysiology of   Encounter Diagnosis   Name Primary?    Closed nondisplaced fracture of neck of second metacarpal bone of left hand, initial encounter Yes       - repeat hand x-ray today  - Continue Exos brace for activity/public, wean out at home  - follow-up in 5 weeks for recheck and repeat x-ray  - no lifting or weight-bearing left hand, will begin strengthening in OT at 8 weeks post-injury  - call with questions or concerns    Disclaimer: This note has been generated using voice-recognition software. There may be typographical errors that have been missed during proof-reading.

## 2019-09-25 NOTE — LETTER
September 25, 2019      Caitlin King, NP  [address]  [facility] [address]           [facility], [place], operated by [facility] 9 [facility] [address]20  [address], [address]  [facility] [address]  Phone: [phone]          Patient: Krystle Austin   MR Number: [id number]   YOB: 1970   Date of Visit: 9/25/2019       Dear Caitlin King:    Thank you for referring Krystle Austin to me for evaluation. Attached you will find relevant portions of my assessment and plan of care.    If you have questions, please do not hesitate to call me. I look forward to following Krystle Austin along with you.    Sincerely,    GLYNN Nicole    Enclosure  CC:  No Recipients    If you would like to receive this communication electronically, please contact [email or web address] or [phone] to request more information on [employer] Link access.    For providers and/or their staff who would like to refer a patient to [facility], please contact us through our one-stop-shop provider referral line, [facility], at [phone].    If you feel you have received this communication in error or would no longer like to receive these types of communications, please e-mail [email or web address]

## 2019-09-30 ENCOUNTER — CLINICAL SUPPORT (OUTPATIENT)
Dept: REHABILITATION | Facility: HOSPITAL | Age: 49
End: 2019-09-30
Attending: PHYSICIAN ASSISTANT
Payer: COMMERCIAL

## 2019-09-30 DIAGNOSIS — M25.642 STIFFNESS OF LEFT HAND, NOT ELSEWHERE CLASSIFIED: ICD-10-CM

## 2019-09-30 PROCEDURE — 97018 PARAFFIN BATH THERAPY: CPT | Mod: PO

## 2019-09-30 PROCEDURE — 97110 THERAPEUTIC EXERCISES: CPT | Mod: PO

## 2019-09-30 NOTE — PROGRESS NOTES
Occupational Therapy Daily Treatment Note     Date: 9/30/2019  Name: Krystle Austin  Clinic Number: [id number]    Therapy Diagnosis:   Encounter Diagnosis   Name Primary?    Stiffness of left hand, not elsewhere classified      Physician: Bridgett Tabor PA    Physician Orders: Non-Op treatment 2nd metacarpal head fx Start OT in 2 weeks Eval and Treat, modalities as needed 1-2 times/week for 6+ weeks  Medical Diagnosis: S62.361A (ICD-10-CM) - Closed nondisplaced fracture of neck of second metacarpal bone of left hand, initial encounter  Surgical Procedure and Date: N/a  Evaluation Date: 9/19/2019  Insurance Authorization Period Expiration: 9/3/19  Plan of Care Certification Period: 12/31/19  Date of Return to MD: 9/25/19     Visit # / Visits authorized: 3/ 40   Time In:8AM  Time Out: 9AM  Total Billable Time: 60 minutes     Precautions:  Standard    Subjective     Patient reports she saw PA last week and was told the bone is healing well.    Pain: 1/10  Location: L Index finger    Objective     LEFT Hand AROM Measured in degrees    9/19/19   Wrist Ext/Flex 55/74   Index Finger: MP 55                         PIP 85                         DIP 54                    TROM 194   Middle Finger: MP 77                          PIP 90                          DIP 74                     TROM 241     Krystle Austin received the following supervised modalities after being cleared for contradictions for 15 minutes:   -Patient received paraffin bath to left hand to increase blood flow, circulation, pain management and for tissue elasticity prior to therex.     Krystle Austin received therapeutic exercises for 45 minutes including:  -A/AAROM index/middle finger flexion, isolated MP then composite  -TGEs x 10  -PIP and DIP joint blocking  -Finger abd/add  -edema/soft tissue massage x 3'  -Isolated FDS x 10  -EDC glides with highlighter x 2'  -Isolated IF abduction with 2 sec hold, x 8  -Yellow putty molding x 2', gripping x 2',  blooming x 6 reps  -Nesting small pom poms x 2  -Coban applied to index finger post tx for swelling    Home Exercises and Education Provided     Education provided: Reviewed HEP    Written Home Exercises Provided: Yes  Exercises were reviewed and Krystle Austin was able to demonstrate them prior to the end of the session.  Krystle Austin demonstrated good  understanding of the HEP provided.   .   See EMR under Patient Instructions for exercises provided during initial evaluation.     Assessment     Patient tolerates treatment well without c/o increased pain.    Krystle Austin is progressing well towards her goals and there are no updates to goals at this time. Pt prognosis is Excellent.     Pt will continue to benefit from skilled outpatient occupational therapy to address the deficits listed in the problem list on initial evaluation provide pt/family education and to maximize pt's level of independence in the home and community environment.     Pt's spiritual, cultural and educational needs considered and pt agreeable to plan of care and goals.      Short Term Goals:   To be completed in 1 week:  1) Patient will be independent in HEP --Ongoing  To be completed in 2-4 weeks:  2) Increase TROM of index finger by 15 degrees for IADL performance --Progressing  3) Increase index and middle finger AROM to WNL compared to RUE for improved functional performance --Progressing  4) Assess /pinch strength and set appropriate goals  Long Term Goals:   To be completed by discharge:  1) Patient will report folding 1 basket of laundry independently without subjective report of increased pain  2) Decrease FOTO limitation score to 12%, indicative of improved functional independence    Plan   Outpatient Occupational Therapy 1 times weekly for 8 visits     Updates/Grading for next session:       Ruchi Buckner, OT

## 2019-10-07 ENCOUNTER — CLINICAL SUPPORT (OUTPATIENT)
Dept: REHABILITATION | Facility: HOSPITAL | Age: 49
End: 2019-10-07
Attending: PHYSICIAN ASSISTANT
Payer: COMMERCIAL

## 2019-10-07 ENCOUNTER — TELEPHONE (OUTPATIENT)
Dept: ORTHOPEDICS | Facility: CLINIC | Age: 49
End: 2019-10-07

## 2019-10-07 DIAGNOSIS — M25.642 STIFFNESS OF LEFT HAND, NOT ELSEWHERE CLASSIFIED: ICD-10-CM

## 2019-10-07 PROCEDURE — 97018 PARAFFIN BATH THERAPY: CPT | Mod: PO

## 2019-10-07 PROCEDURE — 97110 THERAPEUTIC EXERCISES: CPT | Mod: PO

## 2019-10-07 NOTE — PROGRESS NOTES
Occupational Therapy Daily Treatment Note     Date: 10/7/2019  Name: Krystle Austin  Clinic Number: [id number]    Therapy Diagnosis:   Encounter Diagnosis   Name Primary?    Stiffness of left hand, not elsewhere classified      Physician: Bridgett Tabor PA    Physician Orders: Non-Op treatment 2nd metacarpal head fx Start OT in 2 weeks Eval and Treat, modalities as needed 1-2 times/week for 6+ weeks  Medical Diagnosis: S62.361A (ICD-10-CM) - Closed nondisplaced fracture of neck of second metacarpal bone of left hand, initial encounter  Surgical Procedure and Date: N/a  Evaluation Date: 9/19/2019  Insurance Authorization Period Expiration: 9/3/19  Plan of Care Certification Period: 12/31/19  Date of Return to MD: 10/30/19     Visit # / Visits authorized: 4/ 40   Time In:8AM  Time Out: 9AM  Total Billable Time: 60 minutes     Precautions:  Standard    Subjective     No new complaints today    Pain: 1/10  Location: L Index finger    Objective     LEFT Hand AROM Measured in degrees    9/19/19   Wrist Ext/Flex 55/74   Index Finger: MP 55                         PIP 85                         DIP 54                    TROM 194   Middle Finger: MP 77                          PIP 90                          DIP 74                     TROM 241     Krystle Austin received the following supervised modalities after being cleared for contradictions for 15 minutes:   -Patient received paraffin bath to left hand to increase blood flow, circulation, pain management and for tissue elasticity prior to therex.     Krystle Austin received therapeutic exercises for 45 minutes including:  -A/AAROM index/middle finger flexion, isolated MP then composite  -TGEs x 10  -PIP and DIP joint blocking  -Finger abd/add  -edema/soft tissue massage x 3'  -Isolated FDS x 10  -EDC glides with highlighter x 2'  -Isolated IF abduction with 2 sec hold, x 8  -Yellow putty molding x 2', gripping x 2', blooming x 6 reps  -Nesting small pom poms x 2  --not- performed today  -Wrist flex/ext/rd 1# x 1' each  -Coban applied to index finger post tx for swelling    Home Exercises and Education Provided     Education provided: Reviewed HEP    Written Home Exercises Provided: Yes  Exercises were reviewed and Krystle Austin was able to demonstrate them prior to the end of the session.  Krystle Austin demonstrated good  understanding of the HEP provided.   .   See EMR under Patient Instructions for exercises provided during initial evaluation.     Assessment     Pt progressing well with treatment. Good performance.    Krystle Austin is progressing well towards her goals and there are no updates to goals at this time. Pt prognosis is Excellent.     Pt will continue to benefit from skilled outpatient occupational therapy to address the deficits listed in the problem list on initial evaluation provide pt/family education and to maximize pt's level of independence in the home and community environment.     Pt's spiritual, cultural and educational needs considered and pt agreeable to plan of care and goals.      Short Term Goals:   To be completed in 1 week:  1) Patient will be independent in HEP --Ongoing  To be completed in 2-4 weeks:  2) Increase TROM of index finger by 15 degrees for IADL performance --Progressing  3) Increase index and middle finger AROM to WNL compared to RUE for improved functional performance --Progressing  4) Assess /pinch strength and set appropriate goals  Long Term Goals:   To be completed by discharge:  1) Patient will report folding 1 basket of laundry independently without subjective report of increased pain  2) Decrease FOTO limitation score to 12%, indicative of improved functional independence    Plan   Outpatient Occupational Therapy 1 times weekly for 8 visits     Updates/Grading for next session:       Ruchi Buckner, OT

## 2019-10-15 ENCOUNTER — CLINICAL SUPPORT (OUTPATIENT)
Dept: REHABILITATION | Facility: HOSPITAL | Age: 49
End: 2019-10-15
Attending: PHYSICIAN ASSISTANT
Payer: COMMERCIAL

## 2019-10-15 DIAGNOSIS — M25.642 STIFFNESS OF LEFT HAND, NOT ELSEWHERE CLASSIFIED: Primary | ICD-10-CM

## 2019-10-15 PROCEDURE — 97110 THERAPEUTIC EXERCISES: CPT | Mod: PO

## 2019-10-15 PROCEDURE — 97018 PARAFFIN BATH THERAPY: CPT | Mod: PO

## 2019-10-15 NOTE — PROGRESS NOTES
Occupational Therapy Daily Treatment Note & Discharge Summary     Date: 10/15/2019  Name: Krystle Austin  Clinic Number: [id number]    Therapy Diagnosis:   Encounter Diagnosis   Name Primary?    Stiffness of left hand, not elsewhere classified Yes     Physician: Bridgett Tabor PA    Physician Orders: Non-Op treatment 2nd metacarpal head fx Start OT in 2 weeks Eval and Treat, modalities as needed 1-2 times/week for 6+ weeks  Medical Diagnosis: S62.361A (ICD-10-CM) - Closed nondisplaced fracture of neck of second metacarpal bone of left hand, initial encounter  Surgical Procedure and Date: N/a  Evaluation Date: 9/19/2019  Insurance Authorization Period Expiration: 9/3/19  Plan of Care Certification Period: 12/31/19  Date of Return to MD: 10/30/19     Visit # / Visits authorized: 6/ 40   Time In:7AM  Time Out: 8AM  Total Billable Time: 60 minutes     Precautions:  Standard    Subjective     No new complaints today    Pain: 1/10  Location: L Index finger    Objective     LEFT Hand AROM Measured in degrees    9/19/19 10/15/19   Wrist Ext/Flex 55/74 75/62   Index Finger: MP 55 80                         PIP 85 70                         DIP 54 55                    TROM 194 205   Middle Finger: MP 77 82                          PIP 90 +5/90                          DIP 74 74                     TROM 241 251      Strength: (ZOË Dynamometer in lbs.) Average 3 trials, Position II:   Left Right   10/15/2019 43 49     Pinch Strength (Measured in psi)  10/15/2019 Left Right   Lateral 8 8   3 Jaw Librado 7.5 9     Krystle Austin received the following supervised modalities after being cleared for contradictions for 15 minutes:   -Patient received paraffin bath to left hand to increase blood flow, circulation, pain management and for tissue elasticity prior to therex.     Krystle Austin received therapeutic exercises for 45 minutes including:  -A/AAROM index/middle finger flexion, isolated MP then composite  -TGEs x  10  -PIP and DIP joint blocking  -Finger abd/add  -edema/soft tissue massage x 3'  -Isolated FDS x 10  -EDC glides with highlighter x 2'  -Isolated IF abduction with 2 sec hold, x 8   -Orange putty molding x 2', gripping x 2', blooming x 6 reps  -Nesting small pom poms x 2 --not- performed today  -Wrist flex/ext/rd 1# x 1' each  -Coban applied to index finger post tx for swelling    Home Exercises and Education Provided     Education provided: Reviewed HEP    Written Home Exercises Provided: Yes  Exercises were reviewed and Krystle Austin was able to demonstrate them prior to the end of the session.  Krystle Austin demonstrated good  understanding of the HEP provided.   .   See EMR under Patient Instructions for exercises provided during initial evaluation.     Assessment     Patient demos good gains in index and middle finger ROM since evaluation. She reports minimal to no pain with ADL and IADL performance. Upon assessment, left hand is within 10% of the right hand with regard to  and pinch. Patient is ready for discharge from therapy at this time.    Pt's spiritual, cultural and educational needs considered and pt agreeable to plan of care and goals.    Short Term Goals:   To be completed in 1 week:  1) Patient will be independent in HEP --Ongoing/Met  To be completed in 2-4 weeks:  2) Increase TROM of index finger by 15 degrees for IADL performance --Met 10/15/19  3) Increase index and middle finger AROM to WNL compared to RUE for improved functional performance --Met 10/15/19  4) Assess /pinch strength and set appropriate goals-Met 10/15/19  Long Term Goals:   To be completed by discharge:  1) Patient will report folding 1 basket of laundry independently without subjective report of increased pain-Met 10/15/19  2) Decrease FOTO limitation score to 12%, indicative of improved functional independence    Plan   Outpatient Occupational Therapy 1 times weekly for 8 visits     Updates/Grading for next session:        Ruchi Buckner, OT

## 2019-10-30 ENCOUNTER — OFFICE VISIT (OUTPATIENT)
Dept: ORTHOPEDICS | Facility: CLINIC | Age: 49
End: 2019-10-30
Payer: COMMERCIAL

## 2019-10-30 ENCOUNTER — HOSPITAL ENCOUNTER (OUTPATIENT)
Dept: RADIOLOGY | Facility: OTHER | Age: 49
Discharge: HOME | End: 2019-10-30
Attending: PHYSICIAN ASSISTANT
Payer: COMMERCIAL

## 2019-10-30 VITALS
DIASTOLIC BLOOD PRESSURE: 90 MMHG | HEART RATE: 60 BPM | SYSTOLIC BLOOD PRESSURE: 163 MMHG | WEIGHT: 174 LBS | BODY MASS INDEX: 30.83 KG/M2 | HEIGHT: 63 IN

## 2019-10-30 DIAGNOSIS — S62.361A CLOSED NONDISPLACED FRACTURE OF NECK OF SECOND METACARPAL BONE OF LEFT HAND, INITIAL ENCOUNTER: ICD-10-CM

## 2019-10-30 DIAGNOSIS — S62.361A CLOSED NONDISPLACED FRACTURE OF NECK OF SECOND METACARPAL BONE OF LEFT HAND, INITIAL ENCOUNTER: Primary | ICD-10-CM

## 2019-10-30 PROCEDURE — 73130 XR HAND COMPLETE 3 VIEW LEFT: ICD-10-PCS | Mod: 26,LT,, | Performed by: RADIOLOGY

## 2019-10-30 PROCEDURE — 3008F PR BODY MASS INDEX (BMI) DOCUMENTED: ICD-10-PCS | Mod: CPTII,S$GLB,, | Performed by: PHYSICIAN ASSISTANT

## 2019-10-30 PROCEDURE — 99213 PR OFFICE/OUTPT VISIT, EST, LEVL III, 20-29 MIN: ICD-10-PCS | Mod: S$GLB,,, | Performed by: PHYSICIAN ASSISTANT

## 2019-10-30 PROCEDURE — 3008F BODY MASS INDEX DOCD: CPT | Mod: CPTII,S$GLB,, | Performed by: PHYSICIAN ASSISTANT

## 2019-10-30 PROCEDURE — 73130 X-RAY EXAM OF HAND: CPT | Mod: TC,FY,LT

## 2019-10-30 PROCEDURE — 99213 OFFICE O/P EST LOW 20 MIN: CPT | Mod: S$GLB,,, | Performed by: PHYSICIAN ASSISTANT

## 2019-10-30 PROCEDURE — 73130 X-RAY EXAM OF HAND: CPT | Mod: 26,LT,, | Performed by: RADIOLOGY

## 2019-10-30 PROCEDURE — 99999 PR PBB SHADOW E&M-EST. PATIENT-LVL III: CPT | Mod: PBBFAC,,, | Performed by: PHYSICIAN ASSISTANT

## 2019-10-30 PROCEDURE — 99999 PR PBB SHADOW E&M-EST. PATIENT-LVL III: ICD-10-PCS | Mod: PBBFAC,,, | Performed by: PHYSICIAN ASSISTANT

## 2019-10-30 NOTE — PROGRESS NOTES
Subjective:      Patient ID: Krystle Austin is a 48 y.o. female.    Chief Complaint: Pain of the Left Hand      HPI  Krystle Austin is a 48 y.o. female presenting today for follow up left second metacarpal head fracture sustained on 8/11/19. She was initially immobilized then began occupational therapy. She reports she has been d/c from therapy. She is doing great. Denies pain. Doing regular ADLs. Not wearing brace.       Review of patient's allergies indicates:   Allergen Reactions    Oxycodone-acetaminophen Hives         Current Outpatient Medications   Medication Sig Dispense Refill    calcium carbonate (OS-STEFANIE) 600 mg calcium (1,500 mg) Tab Take 600 mg by mouth once.      ergocalciferol (ERGOCALCIFEROL) 50,000 unit Cap Take 1 capsule (50,000 Units total) by mouth every 7 days. 4 capsule 0    estradiol (ESTRACE) 1 MG tablet Take 1 mg by mouth once daily.  4    levocetirizine dihydrochloride (XYZAL ORAL) Take by mouth.      multivit-min/iron/folic/rew209 (HAIR, SKIN AND NAILS ADVANCED ORAL) Take by mouth.      multivitamin with minerals tablet Take 1 tablet by mouth once daily.      propranolol (INDERAL) 20 MG tablet Take 1 tablet (20 mg total) by mouth once daily. 30 tablet 11    sertraline (ZOLOFT) 50 MG tablet Take 50 mg by mouth once daily.       No current facility-administered medications for this visit.        Past Medical History:   Diagnosis Date    Endometriosis     Hypertension        Past Surgical History:   Procedure Laterality Date    HYSTERECTOMY  05/2008    TONSILLECTOMY  All I can remember is I was in 4th grade.       Review of Systems:  Constitutional: Negative for chills and fever.   Respiratory: Negative for cough and shortness of breath.    Gastrointestinal: Negative for nausea and vomiting.   Skin: Negative for rash.   Neurological: Negative for dizziness and headaches.   Psychiatric/Behavioral: Negative for depression.   MSK as in HPI       OBJECTIVE:     PHYSICAL EXAM:  BP  "(!) 163/90   Pulse 60   Ht 5' 3" (1.6 m)   Wt 78.9 kg (174 lb)   BMI 30.82 kg/m²     GEN:  NAD, well-developed, well-groomed.  NEURO: Awake, alert, and oriented. Normal attention and concentration.    PSYCH: Normal mood and affect. Behavior is normal.  HEENT: No cervical lymphadenopathy noted.  CARDIOVASCULAR: Radial pulses 2+ bilaterally. No LE edema noted.  PULMONARY: Breath sounds normal. No respiratory distress.  SKIN: Intact, no rashes.      MSK:   LUE:  Good active ROM of the wrist and fingers. No ttp over fracture site. No edema or ecchymosis. AIN/PIN/Radial/Median/Ulnar Nerves assessed in isolation without deficit. Radial & Ulnar arteries palpated 2+. Capillary Refill <3s.    RADIOGRAPHS:  Xray left hand 10/30/19  Impression     Healing 2nd metacarpal head fracture.   Comments: I have personally reviewed the imaging and I agree with the above radiologist's report.    ASSESSMENT/PLAN:       ICD-10-CM ICD-9-CM   1. Closed nondisplaced fracture of neck of second metacarpal bone of left hand, initial encounter S62.361A 815.04     Plan:   -continue HEP  -RTC prn       The patient indicates understanding of these issues and agrees to the plan.    Aide Singleton PA-C  Hand Clinic   [facility]  [place], [place]    "

## 2019-10-30 NOTE — LETTER
October 30, 2019      Caitlin King, NP  [address]  [facility] [address]           [facility] 9 [facility] [address]  [address], [address]  [facility] [address]  Phone: [phone]          Patient: Krystle Austin   MR Number: [id number]   YOB: 1970   Date of Visit: 10/30/2019       Dear Caitlin King:    Thank you for referring Krystle Austin to me for evaluation. Attached you will find relevant portions of my assessment and plan of care.    If you have questions, please do not hesitate to call me. I look forward to following Krystle Austin along with you.    Sincerely,    Aide Singleton PA-C    Enclosure  CC:  No Recipients    If you would like to receive this communication electronically, please contact [email or web address] or [phone] to request more information on [employer] Link access.    For providers and/or their staff who would like to refer a patient to [facility], please contact us through our one-stop-shop provider referral line, [facility], at [phone].    If you feel you have received this communication in error or would no longer like to receive these types of communications, please e-mail [email or web address]

## 2019-11-20 ENCOUNTER — TELEPHONE (OUTPATIENT)
Dept: ORTHOPEDICS | Facility: CLINIC | Age: 49
End: 2019-11-20

## 2019-11-20 NOTE — TELEPHONE ENCOUNTER
----- Message from Gómez Olea sent at 11/19/2019 11:19 AM CST -----  Contact: Pt   Name of Who is Calling: SINGH SZYMANSKI [[id number]]    What is the request in detail: SINGH SZYMANSKI [[id number]] is requesting a call back regards to paperwork...... Please contact to further discuss and advise      Can the clinic reply by [facility]: No     [facility]  [phone] (home)

## 2019-11-20 NOTE — TELEPHONE ENCOUNTER
Spoke with pt informing her we did received her paperwork and will call her when it is completed.  Pt will come pick it up.

## 2019-11-22 ENCOUNTER — TELEPHONE (OUTPATIENT)
Dept: ORTHOPEDICS | Facility: CLINIC | Age: 49
End: 2019-11-22

## 2019-11-22 NOTE — TELEPHONE ENCOUNTER
----- Message from GLYNN Nicole sent at 11/14/2019  1:32 PM CST -----  Injury was 8/11/19, depends on her job.  Light duty with no lifting can be done until 6-8 weeks post-injury. Then gradually increasing lifting and duty at work.   What is she asking for with the paperwork?    ----- Message -----  From: Lori Senior LPN  Sent: 11/14/2019   8:43 AM CST  To: GLYNN Nicole, Ashanti Talbot MD, #    Good morning,     This patient did not have surgery, but she is requesting that we fill out paperwork for her insurance. What would be an appropriate recovery time to provide since she is non-op? Typically I fill out paper work for patients that have surgery, so I would like your input on non-op patients.    Thanks.

## 2019-11-22 NOTE — TELEPHONE ENCOUNTER
----- Message from Aide Singleton PA-C sent at 11/14/2019  1:18 PM CST -----  Should have been able to return after her last visit, fracture is healed and she has been d/c from therapy  ----- Message -----  From: Lori Senior LPN  Sent: 11/14/2019   8:43 AM CST  To: GLYNN Nicole, Ashanti Talbot MD, #    Good morning,     This patient did not have surgery, but she is requesting that we fill out paperwork for her insurance. What would be an appropriate recovery time to provide since she is non-op? Typically I fill out paper work for patients that have surgery, so I would like your input on non-op patients.    Thanks.

## 2019-11-25 ENCOUNTER — TELEPHONE (OUTPATIENT)
Dept: ORTHOPEDICS | Facility: CLINIC | Age: 49
End: 2019-11-25

## 2019-11-25 NOTE — TELEPHONE ENCOUNTER
----- Message from GLYNN Nicole sent at 11/25/2019  9:16 AM CST -----  Yes, I believe we already answered Lori about this patient.  Lori, do you have everything you need on her?    ----- Message -----  From: Ashanti Talbot MD  Sent: 11/23/2019  10:47 AM CST  To: GLYNN Nicole, Aide Singleton PA-C, #    Is this taken care of? I did not see the pt  ----- Message -----  From: Lori Senior LPN  Sent: 11/14/2019   8:43 AM CST  To: GLYNN Nicole, Ashanti Talbot MD, #    Good morning,     This patient did not have surgery, but she is requesting that we fill out paperwork for her insurance. What would be an appropriate recovery time to provide since she is non-op? Typically I fill out paper work for patients that have surgery, so I would like your input on non-op patients.    Thanks.

## 2019-12-03 ENCOUNTER — TELEPHONE (OUTPATIENT)
Dept: ORTHOPEDICS | Facility: CLINIC | Age: 49
End: 2019-12-03

## 2019-12-03 NOTE — TELEPHONE ENCOUNTER
----- Message from Ivett Yuridia sent at 12/3/2019 10:22 AM CST -----  Contact: SINGH SZYMANSKI  Type: Patient Call Back    Who called:SINGH SZYMANSKI    What is the request in detail: Patient is requesting a call back. She would like to know if her paperwork is available for . Please advise.     Can the clinic reply by [facility]? No    Best call back number: [phone]    Additional Information: N/A

## 2019-12-03 NOTE — TELEPHONE ENCOUNTER
Spoke with pt in regards to paperwork. Pt wanted to know if paperwork was available for . I informed she can come  paperwork on tomorrow between 8:30am - 4:00pm. Pt verbalized understanding and confirmed her  Danyel Alvarez will come to  papers.

## 2019-12-04 ENCOUNTER — TELEPHONE (OUTPATIENT)
Dept: ORTHOPEDICS | Facility: CLINIC | Age: 49
End: 2019-12-04

## 2020-06-23 ENCOUNTER — OFFICE VISIT (OUTPATIENT)
Dept: PODIATRY | Facility: CLINIC | Age: 50
End: 2020-06-23
Payer: COMMERCIAL

## 2020-06-23 VITALS
BODY MASS INDEX: 30.83 KG/M2 | WEIGHT: 174 LBS | DIASTOLIC BLOOD PRESSURE: 87 MMHG | HEIGHT: 63 IN | HEART RATE: 62 BPM | SYSTOLIC BLOOD PRESSURE: 143 MMHG | RESPIRATION RATE: 18 BRPM

## 2020-06-23 DIAGNOSIS — M21.621 TAILOR'S BUNION OF RIGHT FOOT: Primary | ICD-10-CM

## 2020-06-23 PROCEDURE — 3077F SYST BP >= 140 MM HG: CPT | Mod: CPTII,S$GLB,, | Performed by: PODIATRIST

## 2020-06-23 PROCEDURE — 3008F BODY MASS INDEX DOCD: CPT | Mod: CPTII,S$GLB,, | Performed by: PODIATRIST

## 2020-06-23 PROCEDURE — 3079F PR MOST RECENT DIASTOLIC BLOOD PRESSURE 80-89 MM HG: ICD-10-PCS | Mod: CPTII,S$GLB,, | Performed by: PODIATRIST

## 2020-06-23 PROCEDURE — 99203 OFFICE O/P NEW LOW 30 MIN: CPT | Mod: S$GLB,,, | Performed by: PODIATRIST

## 2020-06-23 PROCEDURE — 99999 PR PBB SHADOW E&M-EST. PATIENT-LVL IV: ICD-10-PCS | Mod: PBBFAC,,, | Performed by: PODIATRIST

## 2020-06-23 PROCEDURE — 3077F PR MOST RECENT SYSTOLIC BLOOD PRESSURE >= 140 MM HG: ICD-10-PCS | Mod: CPTII,S$GLB,, | Performed by: PODIATRIST

## 2020-06-23 PROCEDURE — 3079F DIAST BP 80-89 MM HG: CPT | Mod: CPTII,S$GLB,, | Performed by: PODIATRIST

## 2020-06-23 PROCEDURE — 99203 PR OFFICE/OUTPT VISIT, NEW, LEVL III, 30-44 MIN: ICD-10-PCS | Mod: S$GLB,,, | Performed by: PODIATRIST

## 2020-06-23 PROCEDURE — 99999 PR PBB SHADOW E&M-EST. PATIENT-LVL IV: CPT | Mod: PBBFAC,,, | Performed by: PODIATRIST

## 2020-06-23 PROCEDURE — 3008F PR BODY MASS INDEX (BMI) DOCUMENTED: ICD-10-PCS | Mod: CPTII,S$GLB,, | Performed by: PODIATRIST

## 2020-06-23 RX ORDER — ESTRADIOL 1 MG/1
TABLET ORAL
COMMUNITY
Start: 2020-06-17 | End: 2020-07-29 | Stop reason: CLARIF

## 2020-06-23 RX ORDER — SERTRALINE HCL 50 MG
TABLET ORAL
COMMUNITY
Start: 2020-06-17 | End: 2020-07-29 | Stop reason: CLARIF

## 2020-06-23 NOTE — PROGRESS NOTES
Subjective:      Patient ID: Krystle Austin is a 49 y.o. female.    Chief Complaint: PCP (Caitlin King NP  9/10/19) and Foot Problem (Bunion Rt foot )    Krystle is a 49 y.o. female who presents to the podiatry clinic  with complaint of  right foot pain. Onset of the symptoms was several months ago. Precipitating event: none known. Current symptoms include: ability to bear weight, but with some pain. Aggravating factors: any weight bearing. Symptoms have progressed to a point and plateaued. Patient has had no prior foot problems. Evaluation to date: none. Treatment to date: none. Patients rates pain 6/10 on pain scale.        Review of Systems   Constitution: Negative for chills, fever and malaise/fatigue.   HENT: Negative for hearing loss.    Cardiovascular: Negative for claudication.   Respiratory: Negative for shortness of breath.    Skin: Negative for flushing and rash.   Musculoskeletal: Negative for joint pain and myalgias.   Neurological: Negative for loss of balance, numbness, paresthesias and sensory change.   Psychiatric/Behavioral: Negative for altered mental status.           Objective:      Physical Exam  Cardiovascular:      Pulses:           Dorsalis pedis pulses are 2+ on the right side and 2+ on the left side.        Posterior tibial pulses are 2+ on the right side and 2+ on the left side.      Comments: No edema noted to b/L LEs  Musculoskeletal:      Comments: Adequate joint ROM noted to all lower extremity muscle groups   Prominent 5th met head/Tailor's bunion deformity noted to b/L lower extremities with right>left    Hallux valgus deformity noted to both feet with dorsal medial eminence. no pain with ROM of 1st MPJ     Feet:      Right foot:      Protective Sensation: 5 sites tested. 5 sites sensed.      Left foot:      Protective Sensation: 5 sites tested. 5 sites sensed.   Skin:     Comments: Normal skin tugor noted.   No open lesion noted b/L  Skin temp is warm to warm from  proximal to distal b/L.  Webspaces clean, dry, and intact  Nails x10 short   Neurological:      Comments: Intact gross sensation noted to b/L LEs               Assessment:       Encounter Diagnosis   Name Primary?    Asher's bunion of right foot Yes         Plan:       Krystle was seen today for pcp and foot problem.    Diagnoses and all orders for this visit:    Asher's bunion of right foot  -     X-Ray Foot Complete 3 view Right; Future      I counseled the patient on her conditions, their implications and medical management.      Pt advised pain is from large asher's bunion on right foot  Conservative and surgical tx options offered to pt for asher's bunion.   Shoe modification advised for the pt. Pt was advised to obtain shoes will accommodate foot deformities.     X-rays ordered for sx planning  Pt advised on surgical clearance from PCP if interested in sx  Call or return to clinic prn if these symptoms worsen or fail to improve as anticipated.    .

## 2020-06-24 ENCOUNTER — TELEPHONE (OUTPATIENT)
Dept: INTERNAL MEDICINE | Facility: CLINIC | Age: 50
End: 2020-06-24

## 2020-06-24 NOTE — TELEPHONE ENCOUNTER
----- Message from Erikaderian Lee sent at 6/24/2020 10:58 AM CDT -----  Contact: [phone]  Appointment Request From: Krystle Austin    With Provider: Caitlin King NP [[facility]]    Preferred Date Range: 7/3/2020 - 7/3/2020    Preferred Times: Any Time    Reason for visit: Surgery    Comments:  I'm trying to schedule bunion surgery for the end of July w/Dr. Muir.  Need to have blood test, etc. done.

## 2020-06-26 ENCOUNTER — HOSPITAL ENCOUNTER (OUTPATIENT)
Dept: RADIOLOGY | Facility: HOSPITAL | Age: 50
Discharge: HOME | End: 2020-06-26
Attending: PODIATRIST
Payer: COMMERCIAL

## 2020-06-26 DIAGNOSIS — M21.621 TAILOR'S BUNION OF RIGHT FOOT: ICD-10-CM

## 2020-06-26 PROCEDURE — 73630 X-RAY EXAM OF FOOT: CPT | Mod: 26,RT,, | Performed by: RADIOLOGY

## 2020-06-26 PROCEDURE — 73630 X-RAY EXAM OF FOOT: CPT | Mod: TC,PO,RT

## 2020-06-26 PROCEDURE — 73630 XR FOOT COMPLETE 3 VIEW RIGHT: ICD-10-PCS | Mod: 26,RT,, | Performed by: RADIOLOGY

## 2020-07-08 ENCOUNTER — OFFICE VISIT (OUTPATIENT)
Dept: INTERNAL MEDICINE | Facility: CLINIC | Age: 50
End: 2020-07-08
Payer: COMMERCIAL

## 2020-07-08 ENCOUNTER — LAB VISIT (OUTPATIENT)
Dept: LAB | Facility: HOSPITAL | Age: 50
End: 2020-07-08
Payer: COMMERCIAL

## 2020-07-08 VITALS
HEIGHT: 63 IN | OXYGEN SATURATION: 98 % | BODY MASS INDEX: 35.27 KG/M2 | DIASTOLIC BLOOD PRESSURE: 82 MMHG | SYSTOLIC BLOOD PRESSURE: 122 MMHG | HEART RATE: 75 BPM | WEIGHT: 199.06 LBS

## 2020-07-08 DIAGNOSIS — I10 HYPERTENSION, UNSPECIFIED TYPE: ICD-10-CM

## 2020-07-08 DIAGNOSIS — Z01.818 PREOP EXAMINATION: Primary | ICD-10-CM

## 2020-07-08 DIAGNOSIS — M21.621 TAILOR'S BUNION OF RIGHT FOOT: ICD-10-CM

## 2020-07-08 DIAGNOSIS — Z01.818 PREOP EXAMINATION: ICD-10-CM

## 2020-07-08 LAB
ALBUMIN SERPL BCP-MCNC: 3.7 G/DL (ref 3.5–5.2)
ALP SERPL-CCNC: 85 U/L (ref 55–135)
ALT SERPL W/O P-5'-P-CCNC: 13 U/L (ref 10–44)
ANION GAP SERPL CALC-SCNC: 9 MMOL/L (ref 8–16)
APTT BLDCRRT: 26.1 SEC (ref 21–32)
AST SERPL-CCNC: 17 U/L (ref 10–40)
BACTERIA #/AREA URNS AUTO: NORMAL /HPF
BASOPHILS # BLD AUTO: 0.04 K/UL (ref 0–0.2)
BASOPHILS NFR BLD: 0.4 % (ref 0–1.9)
BILIRUB SERPL-MCNC: 0.3 MG/DL (ref 0.1–1)
BILIRUB UR QL STRIP: NEGATIVE
BUN SERPL-MCNC: 12 MG/DL (ref 6–20)
CALCIUM SERPL-MCNC: 9 MG/DL (ref 8.7–10.5)
CHLORIDE SERPL-SCNC: 105 MMOL/L (ref 95–110)
CLARITY UR REFRACT.AUTO: CLEAR
CO2 SERPL-SCNC: 27 MMOL/L (ref 23–29)
COLOR UR AUTO: YELLOW
CREAT SERPL-MCNC: 0.7 MG/DL (ref 0.5–1.4)
DIFFERENTIAL METHOD: ABNORMAL
EOSINOPHIL # BLD AUTO: 0.3 K/UL (ref 0–0.5)
EOSINOPHIL NFR BLD: 3.3 % (ref 0–8)
ERYTHROCYTE [DISTWIDTH] IN BLOOD BY AUTOMATED COUNT: 13.1 % (ref 11.5–14.5)
EST. GFR  (AFRICAN AMERICAN): >60 ML/MIN/1.73 M^2
EST. GFR  (NON AFRICAN AMERICAN): >60 ML/MIN/1.73 M^2
GLUCOSE SERPL-MCNC: 92 MG/DL (ref 70–110)
GLUCOSE UR QL STRIP: NEGATIVE
HCT VFR BLD AUTO: 44.8 % (ref 37–48.5)
HGB BLD-MCNC: 14 G/DL (ref 12–16)
HGB UR QL STRIP: NEGATIVE
IMM GRANULOCYTES # BLD AUTO: 0.03 K/UL (ref 0–0.04)
IMM GRANULOCYTES NFR BLD AUTO: 0.3 % (ref 0–0.5)
INR PPP: 0.9 (ref 0.8–1.2)
KETONES UR QL STRIP: NEGATIVE
LEUKOCYTE ESTERASE UR QL STRIP: ABNORMAL
LYMPHOCYTES # BLD AUTO: 2.3 K/UL (ref 1–4.8)
LYMPHOCYTES NFR BLD: 25.3 % (ref 18–48)
MCH RBC QN AUTO: 27.5 PG (ref 27–31)
MCHC RBC AUTO-ENTMCNC: 31.3 G/DL (ref 32–36)
MCV RBC AUTO: 88 FL (ref 82–98)
MICROSCOPIC COMMENT: NORMAL
MONOCYTES # BLD AUTO: 0.8 K/UL (ref 0.3–1)
MONOCYTES NFR BLD: 8.9 % (ref 4–15)
NEUTROPHILS # BLD AUTO: 5.6 K/UL (ref 1.8–7.7)
NEUTROPHILS NFR BLD: 61.8 % (ref 38–73)
NITRITE UR QL STRIP: NEGATIVE
NRBC BLD-RTO: 0 /100 WBC
PH UR STRIP: 8 [PH] (ref 5–8)
PLATELET # BLD AUTO: 347 K/UL (ref 150–350)
PMV BLD AUTO: 11 FL (ref 9.2–12.9)
POTASSIUM SERPL-SCNC: 4.3 MMOL/L (ref 3.5–5.1)
PROT SERPL-MCNC: 7.7 G/DL (ref 6–8.4)
PROT UR QL STRIP: NEGATIVE
PROTHROMBIN TIME: 9.7 SEC (ref 9–12.5)
RBC # BLD AUTO: 5.09 M/UL (ref 4–5.4)
SODIUM SERPL-SCNC: 141 MMOL/L (ref 136–145)
SP GR UR STRIP: 1.01 (ref 1–1.03)
SQUAMOUS #/AREA URNS AUTO: 1 /HPF
URN SPEC COLLECT METH UR: ABNORMAL
WBC # BLD AUTO: 9.06 K/UL (ref 3.9–12.7)
WBC #/AREA URNS AUTO: 1 /HPF (ref 0–5)

## 2020-07-08 PROCEDURE — 81001 URINALYSIS AUTO W/SCOPE: CPT

## 2020-07-08 PROCEDURE — 99214 OFFICE O/P EST MOD 30 MIN: CPT | Mod: S$GLB,,, | Performed by: NURSE PRACTITIONER

## 2020-07-08 PROCEDURE — 85610 PROTHROMBIN TIME: CPT

## 2020-07-08 PROCEDURE — 99999 PR PBB SHADOW E&M-EST. PATIENT-LVL IV: ICD-10-PCS | Mod: PBBFAC,,, | Performed by: NURSE PRACTITIONER

## 2020-07-08 PROCEDURE — 99214 PR OFFICE/OUTPT VISIT, EST, LEVL IV, 30-39 MIN: ICD-10-PCS | Mod: S$GLB,,, | Performed by: NURSE PRACTITIONER

## 2020-07-08 PROCEDURE — 80053 COMPREHEN METABOLIC PANEL: CPT

## 2020-07-08 PROCEDURE — 3079F PR MOST RECENT DIASTOLIC BLOOD PRESSURE 80-89 MM HG: ICD-10-PCS | Mod: CPTII,S$GLB,, | Performed by: NURSE PRACTITIONER

## 2020-07-08 PROCEDURE — 3008F PR BODY MASS INDEX (BMI) DOCUMENTED: ICD-10-PCS | Mod: CPTII,S$GLB,, | Performed by: NURSE PRACTITIONER

## 2020-07-08 PROCEDURE — 85730 THROMBOPLASTIN TIME PARTIAL: CPT

## 2020-07-08 PROCEDURE — 36415 COLL VENOUS BLD VENIPUNCTURE: CPT

## 2020-07-08 PROCEDURE — 99999 PR PBB SHADOW E&M-EST. PATIENT-LVL IV: CPT | Mod: PBBFAC,,, | Performed by: NURSE PRACTITIONER

## 2020-07-08 PROCEDURE — 85025 COMPLETE CBC W/AUTO DIFF WBC: CPT

## 2020-07-08 PROCEDURE — 3074F SYST BP LT 130 MM HG: CPT | Mod: CPTII,S$GLB,, | Performed by: NURSE PRACTITIONER

## 2020-07-08 PROCEDURE — 3008F BODY MASS INDEX DOCD: CPT | Mod: CPTII,S$GLB,, | Performed by: NURSE PRACTITIONER

## 2020-07-08 PROCEDURE — 3074F PR MOST RECENT SYSTOLIC BLOOD PRESSURE < 130 MM HG: ICD-10-PCS | Mod: CPTII,S$GLB,, | Performed by: NURSE PRACTITIONER

## 2020-07-08 PROCEDURE — 3079F DIAST BP 80-89 MM HG: CPT | Mod: CPTII,S$GLB,, | Performed by: NURSE PRACTITIONER

## 2020-07-08 NOTE — H&P (VIEW-ONLY)
The patient, Krystle Austin , who is a 49 y.o.  female with HTN, anxiety and depression presents for a preoperative exam.     No ref. provider found     HPI:  Pt schedule for right tailor's bunion removal with Dr Muir     Hysterectomy and tonsillectomy in past - post-anesthesia nausea and vomiting.      HTN- taking propranolol     Depression and anxiety- taking zoloft     Exercise- walks 1-2 miles 2-3 times per week. Without SOB or chest pain. Able to perform house work without sob or chest pain     Patient Active Problem List   Diagnosis    Hypertension    Closed nondisplaced fracture of neck of second metacarpal bone of left hand    Stiffness of left hand, not elsewhere classified        Past Medical History:   Diagnosis Date    Endometriosis     Hypertension         Past Surgical History:   Procedure Laterality Date    HYSTERECTOMY  05/2008    TONSILLECTOMY  All I can remember is I was in 4th grade.        Family History   Problem Relation Age of Onset    Alcohol abuse Maternal Grandmother     Cancer Maternal Grandmother     Asthma Son     Cancer Mother     COPD Mother 61        lung     Learning disabilities Son     Miscarriages / Stillbirths Sister         Social History     Tobacco Use   Smoking Status Never Smoker   Smokeless Tobacco Never Used        Allergies as of 07/08/2020 - Reviewed 07/08/2020   Allergen Reaction Noted    Oxycodone-acetaminophen Hives 08/13/2019        Current Outpatient Medications on File Prior to Visit   Medication Sig Dispense Refill    calcium carbonate (OS-STEFANIE) 600 mg calcium (1,500 mg) Tab Take 600 mg by mouth once.      estradiol (ESTRACE) 1 MG tablet Take 1 mg by mouth once daily.  4    estradioL (ESTRACE) 1 MG tablet       levocetirizine dihydrochloride (XYZAL ORAL) Take by mouth.      multivit-min/iron/folic/yaj690 (HAIR, SKIN AND NAILS ADVANCED ORAL) Take by mouth.      multivitamin with minerals tablet Take 1 tablet by mouth once daily.       "propranolol (INDERAL) 20 MG tablet Take 1 tablet (20 mg total) by mouth once daily. 30 tablet 11    sertraline (ZOLOFT) 50 MG tablet Take 50 mg by mouth once daily.      ZOLOFT 50 mg tablet        No current facility-administered medications on file prior to visit.         Review of Systems -   Review of Systems   Constitutional: Negative for chills, diaphoresis, fever and malaise/fatigue.   Respiratory: Negative for cough, sputum production, shortness of breath and wheezing.    Cardiovascular: Negative for chest pain, palpitations, orthopnea, claudication, leg swelling and PND.   Gastrointestinal: Negative for abdominal pain, constipation, diarrhea, heartburn, nausea and vomiting.   Musculoskeletal: Positive for joint pain (right foot ).   Neurological: Negative for dizziness, seizures, loss of consciousness, weakness and headaches.   Endo/Heme/Allergies: Positive for environmental allergies. Bruises/bleeds easily.       Vitals:    07/08/20 1603   BP: 122/82   BP Location: Right arm   Patient Position: Sitting   BP Method: Large (Manual)   Pulse: 75   SpO2: 98%   Weight: 90.3 kg (199 lb 1.2 oz)   Height: 5' 3" (1.6 m)     Body mass index is 35.26 kg/m².     Physical Exam  Vitals signs reviewed.   Constitutional:       Appearance: She is well-developed.   HENT:      Head: Normocephalic.      Right Ear: External ear normal.      Left Ear: External ear normal.      Nose: Nose normal.      Mouth/Throat:      Pharynx: No oropharyngeal exudate.   Eyes:      Pupils: Pupils are equal, round, and reactive to light.   Neck:      Musculoskeletal: Neck supple.      Thyroid: No thyromegaly.      Vascular: No JVD.      Trachea: No tracheal deviation.   Cardiovascular:      Rate and Rhythm: Normal rate and regular rhythm.      Heart sounds: Normal heart sounds. No murmur. No friction rub. No gallop.    Pulmonary:      Effort: Pulmonary effort is normal. No respiratory distress.      Breath sounds: Normal breath sounds. No " wheezing or rales.   Abdominal:      General: Bowel sounds are normal. There is no distension.      Palpations: Abdomen is soft.      Tenderness: There is no abdominal tenderness.   Musculoskeletal: Normal range of motion.         General: No tenderness.      Right foot: Deformity present.        Feet:    Lymphadenopathy:      Cervical: No cervical adenopathy.   Skin:     General: Skin is warm and dry.      Findings: No rash.   Neurological:      Mental Status: She is alert and oriented to person, place, and time.   Psychiatric:         Behavior: Behavior normal.         There are no diagnoses linked to this encounter.       Patient is cleared for anesthesia and surgery. Instructions for optimization of medical problems were given. All over the counter medications are to be stopped one week prior to surgery.    Special instructions:     Lab Results   Component Value Date    WBC 6.02 08/13/2019    HGB 13.6 08/13/2019    HCT 41.5 08/13/2019     08/13/2019    CHOL 155 08/13/2019    TRIG 90 08/13/2019    HDL 51 08/13/2019    ALT 31 08/13/2019    AST 19 08/13/2019     08/13/2019    K 4.2 08/13/2019     08/13/2019    CREATININE 0.7 08/13/2019    BUN 11 08/13/2019    CO2 27 08/13/2019    TSH 1.357 08/13/2019        Lab Results   Component Value Date    ALT 31 08/13/2019    AST 19 08/13/2019    ALKPHOS 65 08/13/2019    BILITOT 0.5 08/13/2019      Preop examination- BP controlled. No risk factors METs >6. Low risk for low risk procedures.   -     CBC auto differential; Future; Expected date: 07/08/2020  -     Comprehensive metabolic panel; Future; Expected date: 07/08/2020  -     Urinalysis  -     Protime-INR; Future; Expected date: 07/08/2020  -     APTT; Future; Expected date: 07/08/2020    Hypertension, unspecified type- stable. Will cont current meds.   -     CBC auto differential; Future; Expected date: 07/08/2020  -     Comprehensive metabolic panel; Future; Expected date: 07/08/2020  -      Urinalysis    Tailor's bunion of right foot- has surgery scheduled will f/u with podiatry     Other orders  -     Urinalysis Microscopic    Caitlin Aguilar-Vic WALKER, APRN, FNP-c  Nurse Practitioner   Internal Medicine   [address] [facility] [id number] [phone]

## 2020-07-08 NOTE — PROGRESS NOTES
The patient, Krystle Austin , who is a 49 y.o.  female with HTN, anxiety and depression presents for a preoperative exam.     No ref. provider found     HPI:  Pt schedule for right tailor's bunion removal with Dr Muir     Hysterectomy and tonsillectomy in past - post-anesthesia nausea and vomiting.      HTN- taking propranolol     Depression and anxiety- taking zoloft     Exercise- walks 1-2 miles 2-3 times per week. Without SOB or chest pain. Able to perform house work without sob or chest pain     Patient Active Problem List   Diagnosis    Hypertension    Closed nondisplaced fracture of neck of second metacarpal bone of left hand    Stiffness of left hand, not elsewhere classified        Past Medical History:   Diagnosis Date    Endometriosis     Hypertension         Past Surgical History:   Procedure Laterality Date    HYSTERECTOMY  05/2008    TONSILLECTOMY  All I can remember is I was in 4th grade.        Family History   Problem Relation Age of Onset    Alcohol abuse Maternal Grandmother     Cancer Maternal Grandmother     Asthma Son     Cancer Mother     COPD Mother 61        lung     Learning disabilities Son     Miscarriages / Stillbirths Sister         Social History     Tobacco Use   Smoking Status Never Smoker   Smokeless Tobacco Never Used        Allergies as of 07/08/2020 - Reviewed 07/08/2020   Allergen Reaction Noted    Oxycodone-acetaminophen Hives 08/13/2019        Current Outpatient Medications on File Prior to Visit   Medication Sig Dispense Refill    calcium carbonate (OS-STEFANIE) 600 mg calcium (1,500 mg) Tab Take 600 mg by mouth once.      estradiol (ESTRACE) 1 MG tablet Take 1 mg by mouth once daily.  4    estradioL (ESTRACE) 1 MG tablet       levocetirizine dihydrochloride (XYZAL ORAL) Take by mouth.      multivit-min/iron/folic/zzc561 (HAIR, SKIN AND NAILS ADVANCED ORAL) Take by mouth.      multivitamin with minerals tablet Take 1 tablet by mouth once daily.       "propranolol (INDERAL) 20 MG tablet Take 1 tablet (20 mg total) by mouth once daily. 30 tablet 11    sertraline (ZOLOFT) 50 MG tablet Take 50 mg by mouth once daily.      ZOLOFT 50 mg tablet        No current facility-administered medications on file prior to visit.         Review of Systems -   Review of Systems   Constitutional: Negative for chills, diaphoresis, fever and malaise/fatigue.   Respiratory: Negative for cough, sputum production, shortness of breath and wheezing.    Cardiovascular: Negative for chest pain, palpitations, orthopnea, claudication, leg swelling and PND.   Gastrointestinal: Negative for abdominal pain, constipation, diarrhea, heartburn, nausea and vomiting.   Musculoskeletal: Positive for joint pain (right foot ).   Neurological: Negative for dizziness, seizures, loss of consciousness, weakness and headaches.   Endo/Heme/Allergies: Positive for environmental allergies. Bruises/bleeds easily.       Vitals:    07/08/20 1603   BP: 122/82   BP Location: Right arm   Patient Position: Sitting   BP Method: Large (Manual)   Pulse: 75   SpO2: 98%   Weight: 90.3 kg (199 lb 1.2 oz)   Height: 5' 3" (1.6 m)     Body mass index is 35.26 kg/m².     Physical Exam  Vitals signs reviewed.   Constitutional:       Appearance: She is well-developed.   HENT:      Head: Normocephalic.      Right Ear: External ear normal.      Left Ear: External ear normal.      Nose: Nose normal.      Mouth/Throat:      Pharynx: No oropharyngeal exudate.   Eyes:      Pupils: Pupils are equal, round, and reactive to light.   Neck:      Musculoskeletal: Neck supple.      Thyroid: No thyromegaly.      Vascular: No JVD.      Trachea: No tracheal deviation.   Cardiovascular:      Rate and Rhythm: Normal rate and regular rhythm.      Heart sounds: Normal heart sounds. No murmur. No friction rub. No gallop.    Pulmonary:      Effort: Pulmonary effort is normal. No respiratory distress.      Breath sounds: Normal breath sounds. No " wheezing or rales.   Abdominal:      General: Bowel sounds are normal. There is no distension.      Palpations: Abdomen is soft.      Tenderness: There is no abdominal tenderness.   Musculoskeletal: Normal range of motion.         General: No tenderness.      Right foot: Deformity present.        Feet:    Lymphadenopathy:      Cervical: No cervical adenopathy.   Skin:     General: Skin is warm and dry.      Findings: No rash.   Neurological:      Mental Status: She is alert and oriented to person, place, and time.   Psychiatric:         Behavior: Behavior normal.         There are no diagnoses linked to this encounter.       Patient is cleared for anesthesia and surgery. Instructions for optimization of medical problems were given. All over the counter medications are to be stopped one week prior to surgery.    Special instructions:     Lab Results   Component Value Date    WBC 6.02 08/13/2019    HGB 13.6 08/13/2019    HCT 41.5 08/13/2019     08/13/2019    CHOL 155 08/13/2019    TRIG 90 08/13/2019    HDL 51 08/13/2019    ALT 31 08/13/2019    AST 19 08/13/2019     08/13/2019    K 4.2 08/13/2019     08/13/2019    CREATININE 0.7 08/13/2019    BUN 11 08/13/2019    CO2 27 08/13/2019    TSH 1.357 08/13/2019        Lab Results   Component Value Date    ALT 31 08/13/2019    AST 19 08/13/2019    ALKPHOS 65 08/13/2019    BILITOT 0.5 08/13/2019      Preop examination- BP controlled. No risk factors METs >6. Low risk for low risk procedures.   -     CBC auto differential; Future; Expected date: 07/08/2020  -     Comprehensive metabolic panel; Future; Expected date: 07/08/2020  -     Urinalysis  -     Protime-INR; Future; Expected date: 07/08/2020  -     APTT; Future; Expected date: 07/08/2020    Hypertension, unspecified type- stable. Will cont current meds.   -     CBC auto differential; Future; Expected date: 07/08/2020  -     Comprehensive metabolic panel; Future; Expected date: 07/08/2020  -      Urinalysis    Tailor's bunion of right foot- has surgery scheduled will f/u with podiatry     Other orders  -     Urinalysis Microscopic    Caitlin Aguilar-Vic WALKER, APRN, FNP-c  Nurse Practitioner   Internal Medicine   [address] [facility] [id number] [phone]

## 2020-07-17 ENCOUNTER — TELEPHONE (OUTPATIENT)
Dept: PODIATRY | Facility: CLINIC | Age: 50
End: 2020-07-17

## 2020-07-17 DIAGNOSIS — M21.619 BUNION: ICD-10-CM

## 2020-07-17 DIAGNOSIS — Z01.818 PRE-OP TESTING: Primary | ICD-10-CM

## 2020-07-17 DIAGNOSIS — M21.621 TAILOR'S BUNION OF RIGHT FOOT: Primary | ICD-10-CM

## 2020-07-17 NOTE — TELEPHONE ENCOUNTER
Nurse scheduled pt for surgery on 7/30 at 11:30 on the second fl with a 9:30am arrival time. Pt instructed not to eat or drink after midnight the day before surgery. Pt scheduled for covid pre op test at location of her choice. And she is also scheduled for her first post op appt. Pt states she understands. Information will also be mailed to pt          ----- Message from Gifty Bowen sent at 7/17/2020  8:58 AM CDT -----  Regarding: Regardimg a surgery schedule  Type:  Needs Medical Advice    Who Called:Krystle  Symptoms (please be specific): Bunion  How long has patient had these symptoms:  2 weeks  Would the patient rather a call back or a response via [facility]? Callback  Best Call Back Number: [phone]  Additional Information: requesting a callback to get a surgery scheduled says she has left messages via [facility] and haven't gotten a response

## 2020-07-28 ENCOUNTER — LAB VISIT (OUTPATIENT)
Dept: URGENT CARE | Facility: CLINIC | Age: 50
End: 2020-07-28
Payer: COMMERCIAL

## 2020-07-28 VITALS — TEMPERATURE: 97 F

## 2020-07-28 DIAGNOSIS — Z01.818 PRE-OP TESTING: ICD-10-CM

## 2020-07-28 PROCEDURE — U0003 INFECTIOUS AGENT DETECTION BY NUCLEIC ACID (DNA OR RNA); SEVERE ACUTE RESPIRATORY SYNDROME CORONAVIRUS 2 (SARS-COV-2) (CORONAVIRUS DISEASE [COVID-19]), AMPLIFIED PROBE TECHNIQUE, MAKING USE OF HIGH THROUGHPUT TECHNOLOGIES AS DESCRIBED BY CMS-2020-01-R: HCPCS

## 2020-07-29 LAB — SARS-COV-2 RNA RESP QL NAA+PROBE: NOT DETECTED

## 2020-07-29 NOTE — PRE-PROCEDURE INSTRUCTIONS
PREOP INSTRUCTIONS:    No solid food ,milk or milk products for 8 hours prior to procedure.Clear liquids are allowed up to 2 hours before procedure.Clear liquids are:water,apple juice,gatorade & powerade.Shower instructions as well as directions to the [facility] were given.Patient encouraged to wear loose fitting,comfortable clothing.Medication instructions for pm prior to and am of procedure reviewed.Instructed patient to avoid taking vitamins,supplements,aspirin and ibuprofen the morning of surgery. Patient stated an understanding.Patient instructed to take temperature the night before surgery as well as the morning of surgery and to notify [facility] at [phone] if it is 100.4 or above.Patient also informed of the current visitor policy and advised patient that one visitor may accompany them into the hospital and wait (socially distanced) .When they enter the hospital both patient and visitor will have their temperature checked,provided a mask and provided assistance to their destination.     Inpatients are allowed 1 visitor/day from 8 am until 6 pm.     Covid screening completed 7/28/2020 and results were negative.     Patient denies any side effects or issues with anesthesia or sedation.     [facility] WILL BE PROVIDING TRANSPORTATION HOME UPON DISCHARGE.

## 2020-07-30 ENCOUNTER — HOSPITAL ENCOUNTER (OUTPATIENT)
Facility: HOSPITAL | Age: 50
Discharge: HOME | End: 2020-07-30
Attending: PODIATRIST | Admitting: PODIATRIST
Payer: COMMERCIAL

## 2020-07-30 ENCOUNTER — ANESTHESIA EVENT (OUTPATIENT)
Dept: SURGERY | Facility: HOSPITAL | Age: 50
End: 2020-07-30
Payer: COMMERCIAL

## 2020-07-30 ENCOUNTER — ANESTHESIA (OUTPATIENT)
Dept: SURGERY | Facility: HOSPITAL | Age: 50
End: 2020-07-30
Payer: COMMERCIAL

## 2020-07-30 VITALS
RESPIRATION RATE: 16 BRPM | DIASTOLIC BLOOD PRESSURE: 64 MMHG | TEMPERATURE: 98 F | HEART RATE: 60 BPM | HEIGHT: 63 IN | WEIGHT: 200 LBS | OXYGEN SATURATION: 96 % | BODY MASS INDEX: 35.44 KG/M2 | SYSTOLIC BLOOD PRESSURE: 122 MMHG

## 2020-07-30 DIAGNOSIS — M21.621 TAILOR'S BUNION OF RIGHT FOOT: Primary | ICD-10-CM

## 2020-07-30 PROCEDURE — D9220A PRA ANESTHESIA: ICD-10-PCS | Mod: CRNA,,, | Performed by: NURSE ANESTHETIST, CERTIFIED REGISTERED

## 2020-07-30 PROCEDURE — 71000044 HC DOSC ROUTINE RECOVERY FIRST HOUR: Performed by: PODIATRIST

## 2020-07-30 PROCEDURE — 36000707: Performed by: PODIATRIST

## 2020-07-30 PROCEDURE — 25000003 PHARM REV CODE 250: Performed by: PODIATRIST

## 2020-07-30 PROCEDURE — 25000003 PHARM REV CODE 250: Performed by: ANESTHESIOLOGY

## 2020-07-30 PROCEDURE — 63600175 PHARM REV CODE 636 W HCPCS: Performed by: ANESTHESIOLOGY

## 2020-07-30 PROCEDURE — 71000016 HC POSTOP RECOV ADDL HR: Performed by: PODIATRIST

## 2020-07-30 PROCEDURE — 37000008 HC ANESTHESIA 1ST 15 MINUTES: Performed by: PODIATRIST

## 2020-07-30 PROCEDURE — 28292 PR CORRECT BUNION, INCL W/RES OF PROX PHALANX BASE, ANY METHOD: ICD-10-PCS | Mod: T5,,, | Performed by: PODIATRIST

## 2020-07-30 PROCEDURE — 71000015 HC POSTOP RECOV 1ST HR: Performed by: PODIATRIST

## 2020-07-30 PROCEDURE — 28288 PR PART REMV BONE METATARSAL HEAD,EA: ICD-10-PCS | Mod: 59,T9,, | Performed by: PODIATRIST

## 2020-07-30 PROCEDURE — D9220A PRA ANESTHESIA: Mod: CRNA,,, | Performed by: NURSE ANESTHETIST, CERTIFIED REGISTERED

## 2020-07-30 PROCEDURE — 36000706: Performed by: PODIATRIST

## 2020-07-30 PROCEDURE — 28288 PARTIAL REMOVAL OF FOOT BONE: CPT | Mod: 59,T9,, | Performed by: PODIATRIST

## 2020-07-30 PROCEDURE — D9220A PRA ANESTHESIA: ICD-10-PCS | Mod: ANES,,, | Performed by: ANESTHESIOLOGY

## 2020-07-30 PROCEDURE — 27201423 OPTIME MED/SURG SUP & DEVICES STERILE SUPPLY: Performed by: PODIATRIST

## 2020-07-30 PROCEDURE — 37000009 HC ANESTHESIA EA ADD 15 MINS: Performed by: PODIATRIST

## 2020-07-30 PROCEDURE — D9220A PRA ANESTHESIA: Mod: ANES,,, | Performed by: ANESTHESIOLOGY

## 2020-07-30 PROCEDURE — S0020 INJECTION, BUPIVICAINE HYDRO: HCPCS | Performed by: PODIATRIST

## 2020-07-30 PROCEDURE — 28292 COR HLX VLGS RSC PRX PHLX BS: CPT | Mod: T5,,, | Performed by: PODIATRIST

## 2020-07-30 RX ORDER — HYDROCODONE BITARTRATE AND ACETAMINOPHEN 5; 325 MG/1; MG/1
1 TABLET ORAL EVERY 4 HOURS PRN
Qty: 30 TABLET | Refills: 0 | Status: SHIPPED | OUTPATIENT
Start: 2020-07-30 | End: 2020-07-30 | Stop reason: SDUPTHER

## 2020-07-30 RX ORDER — KETAMINE HCL IN 0.9 % NACL 50 MG/5 ML
SYRINGE (ML) INTRAVENOUS
Status: DISCONTINUED | OUTPATIENT
Start: 2020-07-30 | End: 2020-07-30

## 2020-07-30 RX ORDER — LIDOCAINE HYDROCHLORIDE 10 MG/ML
INJECTION, SOLUTION EPIDURAL; INFILTRATION; INTRACAUDAL; PERINEURAL
Status: DISCONTINUED | OUTPATIENT
Start: 2020-07-30 | End: 2020-07-30 | Stop reason: HOSPADM

## 2020-07-30 RX ORDER — LIDOCAINE HYDROCHLORIDE 20 MG/ML
INJECTION, SOLUTION EPIDURAL; INFILTRATION; INTRACAUDAL; PERINEURAL
Status: DISCONTINUED | OUTPATIENT
Start: 2020-07-30 | End: 2020-07-30

## 2020-07-30 RX ORDER — KETOROLAC TROMETHAMINE 30 MG/ML
15 INJECTION, SOLUTION INTRAMUSCULAR; INTRAVENOUS EVERY 8 HOURS PRN
Status: DISCONTINUED | OUTPATIENT
Start: 2020-07-30 | End: 2020-07-30 | Stop reason: HOSPADM

## 2020-07-30 RX ORDER — DEXMEDETOMIDINE HYDROCHLORIDE 100 UG/ML
INJECTION, SOLUTION INTRAVENOUS
Status: DISCONTINUED | OUTPATIENT
Start: 2020-07-30 | End: 2020-07-30

## 2020-07-30 RX ORDER — LIDOCAINE HYDROCHLORIDE 10 MG/ML
1 INJECTION, SOLUTION EPIDURAL; INFILTRATION; INTRACAUDAL; PERINEURAL ONCE
Status: COMPLETED | OUTPATIENT
Start: 2020-07-30 | End: 2020-07-30

## 2020-07-30 RX ORDER — CEFAZOLIN SODIUM 1 G/3ML
INJECTION, POWDER, FOR SOLUTION INTRAMUSCULAR; INTRAVENOUS
Status: DISCONTINUED | OUTPATIENT
Start: 2020-07-30 | End: 2020-07-30

## 2020-07-30 RX ORDER — PROPOFOL 10 MG/ML
INJECTION, EMULSION INTRAVENOUS CONTINUOUS PRN
Status: DISCONTINUED | OUTPATIENT
Start: 2020-07-30 | End: 2020-07-30

## 2020-07-30 RX ORDER — ONDANSETRON 2 MG/ML
4 INJECTION INTRAMUSCULAR; INTRAVENOUS ONCE AS NEEDED
Status: DISCONTINUED | OUTPATIENT
Start: 2020-07-30 | End: 2020-07-30 | Stop reason: HOSPADM

## 2020-07-30 RX ORDER — PROPOFOL 10 MG/ML
INJECTION, EMULSION INTRAVENOUS
Status: DISCONTINUED | OUTPATIENT
Start: 2020-07-30 | End: 2020-07-30

## 2020-07-30 RX ORDER — HYDROCODONE BITARTRATE AND ACETAMINOPHEN 5; 325 MG/1; MG/1
1 TABLET ORAL EVERY 4 HOURS PRN
Status: DISCONTINUED | OUTPATIENT
Start: 2020-07-30 | End: 2020-07-30 | Stop reason: HOSPADM

## 2020-07-30 RX ORDER — HYDROMORPHONE HYDROCHLORIDE 1 MG/ML
0.2 INJECTION, SOLUTION INTRAMUSCULAR; INTRAVENOUS; SUBCUTANEOUS EVERY 5 MIN PRN
Status: DISCONTINUED | OUTPATIENT
Start: 2020-07-30 | End: 2020-07-30 | Stop reason: HOSPADM

## 2020-07-30 RX ORDER — HYDROCODONE BITARTRATE AND ACETAMINOPHEN 5; 325 MG/1; MG/1
1 TABLET ORAL EVERY 4 HOURS PRN
Qty: 30 TABLET | Refills: 0 | Status: SHIPPED | OUTPATIENT
Start: 2020-07-30 | End: 2020-08-06

## 2020-07-30 RX ORDER — SODIUM CHLORIDE 9 MG/ML
INJECTION, SOLUTION INTRAVENOUS CONTINUOUS
Status: DISCONTINUED | OUTPATIENT
Start: 2020-07-30 | End: 2020-07-30 | Stop reason: HOSPADM

## 2020-07-30 RX ORDER — MIDAZOLAM HYDROCHLORIDE 1 MG/ML
INJECTION, SOLUTION INTRAMUSCULAR; INTRAVENOUS
Status: DISCONTINUED | OUTPATIENT
Start: 2020-07-30 | End: 2020-07-30

## 2020-07-30 RX ORDER — BUPIVACAINE HYDROCHLORIDE 5 MG/ML
INJECTION, SOLUTION EPIDURAL; INTRACAUDAL
Status: DISCONTINUED | OUTPATIENT
Start: 2020-07-30 | End: 2020-07-30 | Stop reason: HOSPADM

## 2020-07-30 RX ADMIN — DEXMEDETOMIDINE HYDROCHLORIDE 12 MCG: 100 INJECTION, SOLUTION INTRAVENOUS at 01:07

## 2020-07-30 RX ADMIN — LIDOCAINE HYDROCHLORIDE 1 MG: 10 INJECTION, SOLUTION EPIDURAL; INFILTRATION; INTRACAUDAL; PERINEURAL at 10:07

## 2020-07-30 RX ADMIN — PROPOFOL 30 MG: 10 INJECTION, EMULSION INTRAVENOUS at 01:07

## 2020-07-30 RX ADMIN — DEXMEDETOMIDINE HYDROCHLORIDE 12 MCG: 100 INJECTION, SOLUTION INTRAVENOUS at 02:07

## 2020-07-30 RX ADMIN — MIDAZOLAM 2 MG: 1 INJECTION INTRAMUSCULAR; INTRAVENOUS at 01:07

## 2020-07-30 RX ADMIN — PROPOFOL 150 MCG/KG/MIN: 10 INJECTION, EMULSION INTRAVENOUS at 01:07

## 2020-07-30 RX ADMIN — SODIUM CHLORIDE: 0.9 INJECTION, SOLUTION INTRAVENOUS at 10:07

## 2020-07-30 RX ADMIN — CEFAZOLIN 2 G: 330 INJECTION, POWDER, FOR SOLUTION INTRAMUSCULAR; INTRAVENOUS at 01:07

## 2020-07-30 RX ADMIN — Medication 5 MG: at 02:07

## 2020-07-30 RX ADMIN — Medication 10 MG: at 01:07

## 2020-07-30 RX ADMIN — PROPOFOL 200 MCG/KG/MIN: 10 INJECTION, EMULSION INTRAVENOUS at 01:07

## 2020-07-30 RX ADMIN — SODIUM CHLORIDE: 0.9 INJECTION, SOLUTION INTRAVENOUS at 01:07

## 2020-07-30 RX ADMIN — DEXMEDETOMIDINE HYDROCHLORIDE 16 MCG: 100 INJECTION, SOLUTION INTRAVENOUS at 01:07

## 2020-07-30 RX ADMIN — PROPOFOL 20 MG: 10 INJECTION, EMULSION INTRAVENOUS at 01:07

## 2020-07-30 RX ADMIN — LIDOCAINE HYDROCHLORIDE 100 MG: 20 INJECTION, SOLUTION EPIDURAL; INFILTRATION; INTRACAUDAL at 01:07

## 2020-07-30 NOTE — ANESTHESIA POSTPROCEDURE EVALUATION
Anesthesia Post Evaluation    Patient: Krystle Austin    Procedure(s) Performed: Procedure(s) (LRB):  BUNIONECTOMY (Right)  EXCISION, BUNIONETTE (Right)    Final Anesthesia Type: MAC    Patient location during evaluation: PACU  Patient participation: Yes- Able to Participate  Level of consciousness: awake and alert and oriented  Post-procedure vital signs: reviewed and stable  Pain management: adequate  Airway patency: patent    PONV status at discharge: No PONV  Anesthetic complications: no      Cardiovascular status: blood pressure returned to baseline and hemodynamically stable  Respiratory status: unassisted, spontaneous ventilation and room air  Hydration status: euvolemic  Follow-up not needed.          Vitals Value Taken Time   /58 07/30/20 1646   Temp 36.8 °C (98.2 °F) 07/30/20 1515   Pulse 66 07/30/20 1653   Resp 16 07/30/20 1600   SpO2 96 % 07/30/20 1653   Vitals shown include unvalidated device data.      No case tracking events are documented in the log.      Pain/Zeus Score: No data recorded

## 2020-07-30 NOTE — PLAN OF CARE
Patient and spouse state they are ready to be discharged. Instructions and prescription (delivered to bedside) given to patient and family. Both verbalize understanding. Patient tolerating po liquids with no difficulty. Patient denies pain. Anesthesia consent and surgical consent in chart upon patient's discharge from [facility].

## 2020-07-30 NOTE — ANESTHESIA PREPROCEDURE EVALUATION
[facility]  Anesthesia Pre-Operative Evaluation       Patient Name: Krystle Austin  YOB: 1970  MRN: [id number]  [facility]: [id number]      Code Status: No Order   Date of Procedure: 7/30/2020  Anesthesia: Local MAC Procedure: Procedure(s) (LRB):  BUNIONECTOMY (Right)  EXCISION, BUNIONETTE (Right)  Pre-Operative Diagnosis: Tailor's bunion of right foot [M21.621]  Bunion [M21.619]  Proceduralist: Surgeon(s) and Role:     * Rajinder Muir DPM - Primary        SUBJECTIVE:   Krystle Austin is a 49 y.o. female who  has a past medical history of Endometriosis, Hypertension, and PONV (postoperative nausea and vomiting)..   Revised cardiac risk index (RCRI) score is 0.    she has a current medication list which includes the following long-term medication(s): calcium carbonate, estradiol, propranolol, and sertraline.     ALLERGIES:     Review of patient's allergies indicates:   Allergen Reactions    Oxycodone-acetaminophen Hives     LDA:      Lines/Drains/Airways     Peripheral Intravenous Line                 Peripheral IV - Single Lumen 07/30/20 1047 18 G Right Forearm less than 1 day               Anesthesia Evaluation      History of anesthetic complications   Airway   Mallampati: III  TM distance: Normal, at least 6 cm  Neck ROM: Normal ROM  Dental    (+) In tact    Pulmonary    Cardiovascular   Exercise tolerance: good  (+) hypertension,     Neuro/Psych      GI/Hepatic/Renal      Endo/Other    Abdominal                   MEDICATIONS:     Antibiotics (From admission, onward)    None        VTE Risk Mitigation (From admission, onward)    None        Current Facility-Administered Medications   Medication Dose Route Frequency Provider Last Rate Last Dose    0.9%  NaCl infusion   Intravenous Continuous Anisa Dugan MD 10 mL/hr at 07/30/20 1050            History:   There are no hospital problems to display for this patient.    Surgical History:     has a past surgical history that includes Tonsillectomy (All I can remember is I was in 4th grade.); Hysterectomy (05/2008); and Knee cartilage surgery (Left).   Social History:    reports being sexually active and has had partner(s) who are Male. She reports using the following method of birth control/protection: None.  reports that she has never smoked. She has never used smokeless tobacco. She reports previous alcohol use. She reports that she does not use drugs.     OBJECTIVE:     Vital Signs (Most Recent):  Temp: 36.6 °C (97.9 °F) (07/30/20 1042)  Pulse: 61 (07/30/20 1042)  Resp: 18 (07/30/20 1042)  BP: (!) 145/48 (07/30/20 1042)  SpO2: 99 % (07/30/20 1042) Vital Signs Range (Last 24H):  Temp:  [36.6 °C (97.9 °F)]   Pulse:  [61]   Resp:  [18]   BP: (145)/(48)   SpO2:  [99 %]        Body mass index is 35.43 kg/m².   Wt Readings from Last 4 Encounters:   07/30/20 90.7 kg (200 lb)   07/08/20 90.3 kg (199 lb 1.2 oz)   06/23/20 78.9 kg (174 lb)   10/30/19 78.9 kg (174 lb)     Significant Labs:  Lab Results   Component Value Date    WBC 9.06 07/08/2020    HGB 14.0 07/08/2020    HCT 44.8 07/08/2020     07/08/2020     07/08/2020    K 4.3 07/08/2020     07/08/2020    CREATININE 0.7 07/08/2020    BUN 12 07/08/2020    CO2 27 07/08/2020    GLU 92 07/08/2020    CALCIUM 9.0 07/08/2020    ALKPHOS 85 07/08/2020    ALT 13 07/08/2020    AST 17 07/08/2020    ALBUMIN 3.7 07/08/2020    INR 0.9 07/08/2020    APTT 26.1 07/08/2020     No LMP recorded. Patient has had a hysterectomy.  Recent Results (from the past 72 hour(s))   COVID-19 Routine Screening    Collection Time: 07/28/20  9:10 AM   Result Value Ref Range    SARS-CoV2 (COVID-19) Qualitative PCR Not Detected Not Detected     ASSESSMENT/PLAN:     Anesthesia Evaluation    I have reviewed the Patient Summary Reports.    I have reviewed the Nursing Notes.    I have reviewed the Medications.     Review of Systems  Anesthesia Hx:  Hx of Anesthetic complications  PONV   Hematology/Oncology:  Hematology Normal   Oncology Normal     EENT/Dental:EENT/Dental Normal   Cardiovascular:   Exercise tolerance: good Hypertension    Pulmonary:  Pulmonary Normal    Renal/:  Renal/ Normal     Hepatic/GI:  Hepatic/GI Normal    Musculoskeletal:  Musculoskeletal Normal    Neurological:  Neurology Normal    Endocrine:  Endocrine Normal    Dermatological:  Skin Normal    Psych:  Psychiatric Normal           Physical Exam  General:  Well nourished, Obesity    Airway/Jaw/Neck:  Airway Findings: Mouth Opening: Normal Tongue: Normal  General Airway Assessment: Adult  Mallampati: III  Improves to II with phonation.  TM Distance: Normal, at least 6 cm  Jaw/Neck Findings:  Neck ROM: Normal ROM     Eyes/Ears/Nose:  EYES/EARS/NOSE FINDINGS: Normal   Dental:  Dental Findings: In tact   Chest/Lungs:  Chest/Lungs Findings: Clear to auscultation     Heart/Vascular:  Heart Findings: Rhythm: Regular Rhythm  Sounds: Normal  Heart murmur: negative Vascular Findings: Normal    Abdomen:  Abdomen Findings: Normal    Musculoskeletal:  Musculoskeletal Findings: Normal   Skin:  Skin Findings: Normal    Mental Status:  Mental Status Findings:  Cooperative, Alert and Oriented         Anesthesia Plan  Type of Anesthesia, risks & benefits discussed:  Anesthesia Type:  general, MAC, regional  Patient's Preference:   Intra-op Monitoring Plan: standard ASA monitors  Intra-op Monitoring Plan Comments:   Post Op Pain Control Plan: per primary service following discharge from PACU and multimodal analgesia  Post Op Pain Control Plan Comments:   Induction:   IV  Beta Blocker:  Patient is not currently on a Beta-Blocker (No further documentation required).       Informed Consent: Patient understands risks and agrees with Anesthesia plan.  Questions answered. Anesthesia consent signed with patient.  ASA Score: 2     Day of Surgery Review of History & Physical:     H&P completed by Anesthesiologist.   Anesthesia Plan Notes:  Chart reviewed, patient interviewed and examined.  The anesthetic plan was explained.  Risks, benefits, and alternatives were discussed. Questions were answered and the consent was signed.        GILBERTO [facility] From Anesthesia Perspective.

## 2020-07-30 NOTE — TRANSFER OF CARE
"Anesthesia Transfer of Care Note    Patient: Krystle Austin    Procedure(s) Performed: Procedure(s) (LRB):  BUNIONECTOMY (Right)  EXCISION, BUNIONETTE (Right)    Patient location: PACU    Anesthesia Type: general    Transport from OR: Transported from OR on 6-10 L/min O2 by face mask with adequate spontaneous ventilation    Post pain: adequate analgesia    Post assessment: no apparent anesthetic complications and tolerated procedure well    Post vital signs: stable    Level of consciousness: awake and responds to stimulation    Nausea/Vomiting: no nausea/vomiting    Complications: none    Transfer of care protocol was followed      Last vitals:   Visit Vitals  BP (!) 145/48 (BP Location: Left arm, Patient Position: Lying)   Pulse 61   Temp 36.6 °C (97.9 °F) (Oral)   Resp 18   Ht 5' 3" (1.6 m)   Wt 90.7 kg (200 lb)   SpO2 99%   Breastfeeding No   BMI 35.43 kg/m²     "

## 2020-07-30 NOTE — INTERVAL H&P NOTE
The patient has been examined and the H&P has been reviewed:    I concur with the findings and no changes have occurred since H&P was written.    Surgery risks, benefits and alternative options discussed and understood by patient/family.          Active Hospital Problems    Diagnosis  POA    Tailor's bunion of right foot [M21.621]  Yes      Resolved Hospital Problems   No resolved problems to display.

## 2020-07-31 NOTE — BRIEF OP NOTE
[facility]  Brief Operative Note    Surgery Date: 7/30/2020     Surgeon(s) and Role:     * Rajinder Muir DPM - Primary     * Kayla Rico DPM - Resident - 1st Assist     * Mario Mack DPM - Resident - 2nd Assist      Pre-op Diagnosis:  Tailor's bunion of right foot [M21.621]  Bunion [M21.619]    Post-op Diagnosis:  Post-Op Diagnosis Codes:     * Tailor's bunion of right foot [M21.621]     * Bunion [M21.619]    Procedure(s) (LRB):  Silver BUNIONECTOMY (Right)  Condylectomy Tailor's bunion (Right)    Anesthesia: Local MAC    Description of the findings of the procedure(s): Resection of the medial eminence of right 1st metatarsal head (silver bunionectomy). Resection of the lateral eminence of right 5th metatarsal head    Estimated Blood Loss: 3 mL         Specimens:   Specimen (12h ago, onward)    None

## 2020-07-31 NOTE — DISCHARGE SUMMARY
[facility]  Podiatry  Discharge Summary      Patient Name: Krystle Austin  MRN: [id number]  Admission Date: 7/30/2020  Hospital Length of Stay: 0 days  Discharge Date and Time: 7/30/2020  5:20 PM  Attending Physician: No att. providers found   Discharging Provider: Kayla Rico MD  Primary Care Provider: Primary Doctor Mattie    HPI: Krystle Austin is a 49 y.o. female who  has a past medical history of Endometriosis, Hypertension, and PONV (postoperative nausea and vomiting).    Patient placed in outpatient surgery for below procedures.  Patient denies F/C/N/V      Procedure(s) (LRB):  BUNIONECTOMY (Right)  EXCISION, BUNIONETTE (Right)      Hospital Course:   Had above procedures done without complication          Significant Diagnostic Studies: Labs: All labs within the past 24 hours have been reviewed    Pending Diagnostic Studies:     None        Final Active Diagnoses:    Diagnosis Date Noted POA    Tailor's bunion of right foot [M21.621] 07/30/2020 Yes      Problems Resolved During this Admission:      Discharged Condition: good    Disposition: Home or Self Care    Follow Up:    Patient Instructions:      Diet general     Keep surgical extremity elevated     Ice to affected area     Call MD for:  temperature >100.4     Call MD for:  persistent nausea and vomiting     Call MD for:  severe uncontrolled pain     Call MD for:  difficulty breathing, headache or visual disturbances     Call MD for:  redness, tenderness, or signs of infection (pain, swelling, redness, odor or green/yellow discharge around incision site)     Call MD for:  hives     Call MD for:  persistent dizziness or light-headedness     Call MD for:  extreme fatigue     Leave dressing on - Keep it clean, dry, and intact until clinic visit     Weight bearing restrictions (specify)   Order Comments: Partial weightbearing to right heel only in post op shoe for short transfers <10 feet     Medications:  Reconciled Home Medications:       Medication List      START taking these medications    HYDROcodone-acetaminophen 5-325 mg per tablet  Commonly known as: NORCO  Take 1 tablet by mouth every 4 (four) hours as needed for Pain.        CONTINUE taking these medications    calcium carbonate 600 mg calcium (1,500 mg) Tab  Commonly known as: OS-STEFANIE  Take 600 mg by mouth once.     estradioL 1 MG tablet  Commonly known as: ESTRACE  Take 1 mg by mouth once daily.     HAIR, SKIN AND NAILS ADVANCED ORAL  Take by mouth.     multivitamin with minerals tablet  Take 1 tablet by mouth once daily.     propranoloL 20 MG tablet  Commonly known as: INDERAL  Take 1 tablet (20 mg total) by mouth once daily.     sertraline 50 MG tablet  Commonly known as: ZOLOFT  Take 50 mg by mouth once daily.        STOP taking these medications    XYZAL ORAL          Time spent on the discharge of patient: 30 minutes    Kayla Rico DPM  [facility]  Podiatry PGY2  Pager: [phone]

## 2020-07-31 NOTE — OP NOTE
Operative Note       Surgery Date: 7/30/2020     Surgeon(s) and Role:     * Rajinder Muir DPM - Primary     * Kayla Rico DPM - Resident - 1st Assist     * Mario Mack DPM - Resident - 2nd Assist    Pre-op Diagnosis:  Tailor's bunion of right foot [M21.621]  Bunion [M21.619]    Post-op Diagnosis: Post-Op Diagnosis Codes:     * Tailor's bunion of right foot [M21.621]     * Bunion [M21.619]    Procedure(s) (LRB):  Silver BUNIONECTOMY (Right)  Condylectomy tailor's bunionectomy (Right)    Anesthesia: Local MAC    Procedure in Detail/Findings:  The patient was seen in the preop area. The risks, benefits, complications, treatment options, and expected outcomes were discussed with the patient. The risks and potential complications of their problem and purposed treatment include but are not limited to infection, nerve injury, vascular injury, nonunion, persistent pain, potential skin necrosis, deep vein thrombosis, possible pulmonary embolus, complications of the anesthetics and failure of the implant. The patient concurred with the proposed plan, giving informed consent.  The site of surgery properly noted/marked.     The patient was brought to the operating room on a stretcher and transferred onto the operating table in a supine position. Following the successful induction of MAC anesthesia, a well padded pneumatic tourniquet was placed on the right ankle.  A local blackman block and reverse blackman block was administered using 20 ml of a 1:1 mixture of 1% lidocaine plain and 0.5% Bupivicaine plain. A timeout was performed verifying the patient's identity, surgical site, allergies, and antibiotics. All were in agreement. The right foot was prepped and draped in a sterile manner. An esmarch bandage was used to exsanguinate the right foot. Tourniquet was inflated to 250mmHg.    Next, attention was directed to the right foot where a dorsomedial incision was made over the 1st MTP joint. The incision was then  deepened through the subcutaneous tissue, using sharp and blunt dissection.  Care was taken to identify and retract all vital neurovascular and tendon structures.  All small bleeding vessels were cauterized as necessary. Soft tissue dissection yielded visualization of the 1st MTPJ capsule and a dorsal capsulotomy was performed over the 1st MPJ. All capsular structures and soft tissue attachments were sharply freed from the head of the of the 1st metatarsal and base of the 1st proximal phalanx. Next, a sagittal bone saw was used to resect the medial eminence from the 1st metatarsal head. Care was taken not to stake the head and preserve the underlying sesamoidal darius. Surgical site was irrigated with sterile saline solution and capsule was repaired with 3-0 vicryl suture material. Running subQ closure was achieved with 4-0 monocryl suture material.     Next, a longitudinal 5 cm longitudinal incision was made over the dorsal aspect of the 5th MTPJ extending to the base of the proximal phalanx of the 5th toe. The incision was then deepened through the subcutaneous tissue, using sharp and blunt dissection. Care was taken to identify and retract all vital neurovascular and tendinous structures. All small vessels were ligated and cauterized as needed. Soft tissue dissection yielded visualization of the 5th MPJ capsule and a longitudinal capsulotomy was performed over the dorsal aspect of the 5th MPJ. All capsular structures were sharply freed from the head of the lateral aspect of the 5th metatarsal. The lateral eminence from the 5th metatarsal head was then resected using a sagittal bone saw. A rongeur was used to excise fatty tissue surrounding the lateral and plantar aspect of the 5th metatarsal head. The surgical site was then copiously flushed with sterile saline solution and capsule and deep tissue was reapproximated using 3-0 vicryl suture material. Running subQ closure was achived with 4-0 monocryl suture  material.    Surgical sites were secured with steristrips followed by 4x4 gauze, and a dry dressing consisting of kerlix, and ACE bandage was applied to the right foot. Tourniquet was deflated with brisk capillary refill noted to all digits.    The patient was transferred to the recovery room with vital signs stable and vascular status intact. Following a period of post op monitoring, the patient was discharged home with the following post op instructions:   1. Keep dressing dry and intact until Podiatry follow up.   2.Weight bearing status: partial weightbearing to heel only in post op shoe right foot for short transfers <10 feet.  3. All necessary prescriptions ordered and medical management will continue.   4. Contact Podiatry for all post op follow up care and if any problems arise.    Estimated Blood Loss: 3 mL    Specimens (From admission, onward)    None        Implants: * No implants in log *            Disposition: PACU - hemodynamically stable.           Condition: Good    Attestation:  I was present and scrubbed for the entire procedure.             Discharge Date: 7/30/2020  5:20 PM

## 2020-08-06 ENCOUNTER — OFFICE VISIT (OUTPATIENT)
Dept: PODIATRY | Facility: CLINIC | Age: 50
End: 2020-08-06
Payer: COMMERCIAL

## 2020-08-06 VITALS
WEIGHT: 199.94 LBS | HEART RATE: 64 BPM | DIASTOLIC BLOOD PRESSURE: 80 MMHG | BODY MASS INDEX: 35.43 KG/M2 | SYSTOLIC BLOOD PRESSURE: 146 MMHG | HEIGHT: 63 IN

## 2020-08-06 DIAGNOSIS — Z98.890 POST-OPERATIVE STATE: Primary | ICD-10-CM

## 2020-08-06 PROCEDURE — 99999 PR PBB SHADOW E&M-EST. PATIENT-LVL III: ICD-10-PCS | Mod: PBBFAC,,, | Performed by: PODIATRIST

## 2020-08-06 PROCEDURE — 99999 PR PBB SHADOW E&M-EST. PATIENT-LVL III: CPT | Mod: PBBFAC,,, | Performed by: PODIATRIST

## 2020-08-06 PROCEDURE — 99024 PR POST-OP FOLLOW-UP VISIT: ICD-10-PCS | Mod: S$GLB,,, | Performed by: PODIATRIST

## 2020-08-06 PROCEDURE — 99024 POSTOP FOLLOW-UP VISIT: CPT | Mod: S$GLB,,, | Performed by: PODIATRIST

## 2020-08-13 ENCOUNTER — OFFICE VISIT (OUTPATIENT)
Dept: PODIATRY | Facility: CLINIC | Age: 50
End: 2020-08-13
Payer: COMMERCIAL

## 2020-08-13 VITALS
BODY MASS INDEX: 35.43 KG/M2 | SYSTOLIC BLOOD PRESSURE: 145 MMHG | WEIGHT: 199.94 LBS | DIASTOLIC BLOOD PRESSURE: 84 MMHG | HEART RATE: 63 BPM | HEIGHT: 63 IN

## 2020-08-13 DIAGNOSIS — Z98.890 POST-OPERATIVE STATE: Primary | ICD-10-CM

## 2020-08-13 PROCEDURE — 99024 POSTOP FOLLOW-UP VISIT: CPT | Mod: S$GLB,,, | Performed by: PODIATRIST

## 2020-08-13 PROCEDURE — 99999 PR PBB SHADOW E&M-EST. PATIENT-LVL III: CPT | Mod: PBBFAC,,, | Performed by: PODIATRIST

## 2020-08-13 PROCEDURE — 99999 PR PBB SHADOW E&M-EST. PATIENT-LVL III: ICD-10-PCS | Mod: PBBFAC,,, | Performed by: PODIATRIST

## 2020-08-13 PROCEDURE — 99024 PR POST-OP FOLLOW-UP VISIT: ICD-10-PCS | Mod: S$GLB,,, | Performed by: PODIATRIST

## 2020-08-13 NOTE — PROGRESS NOTES
Subjective:      Patient ID: Krystle Austin is a 49 y.o. female.    Chief Complaint: Wound Check (post op)    Krystle Austin is a 49 y.o. female who has a past medical history of Endometriosis, Hypertension, who presents to clinic for post op evaluation. She is now 2 weeks s/p right foot bunionectomy and tailor's bunionectomy (DOS:7/30/2020). Post op pain has improved and she is no longer needing the prescribed pain meds. She only takes OTC ibuprofen. She reports compliance with icing and elevating and has only been partial weightbearing to her R heel for short transfers. She reports improvement with swelling. Denies N/V/F/C.           Review of Systems   Constitution: Negative for chills, diaphoresis, fever and malaise/fatigue.   HENT: Negative for hearing loss.    Eyes: Negative for pain.   Cardiovascular: Negative for claudication.   Respiratory: Negative for cough and shortness of breath.    Skin: Negative for dry skin, flushing, nail changes, poor wound healing, rash and unusual hair distribution.   Musculoskeletal: Negative for arthritis, joint pain and myalgias.   Gastrointestinal: Negative for diarrhea, nausea and vomiting.   Neurological: Negative for loss of balance, numbness, paresthesias and sensory change.   Psychiatric/Behavioral: Negative for altered mental status.           Objective:      Physical Exam  Vitals signs reviewed.   Constitutional:       General: She is not in acute distress.  HENT:      Head: Normocephalic and atraumatic.   Neck:      Musculoskeletal: Normal range of motion.   Cardiovascular:      Rate and Rhythm: Normal rate.      Pulses:           Dorsalis pedis pulses are 2+ on the right side and 2+ on the left side.        Posterior tibial pulses are 2+ on the right side and 2+ on the left side.   Pulmonary:      Effort: Pulmonary effort is normal.   Musculoskeletal:         General: Tenderness present. No signs of injury.      Comments: 5/5 muscle strength for all muscles  crossing at the ankle joint bilateral    Ankle, STJ, midtarsal, WNL b/l. No pain or crepitus with ROM left foot. 1st MTPJ right ROM limited 2/2 pain.     +Tenderness to surgical site at dorsum of 1st metatarsal and 5th metatarsal right foot.    Feet:      Left foot:      Skin integrity: Skin integrity normal.      Comments: Surgical site x 2 c/d/i right foot. No signs of wound dehiscence, no surrounding erythema, no eccymosis, no drainage. No underlying signs of infection. +Edema surrounding surgical site.     Nails x 10 well trimmed.   Skin:     General: Skin is warm and dry.      Capillary Refill: Capillary refill takes 2 to 3 seconds.      Findings: No bruising or erythema.      Comments: Normal turgor bilateral. Skin warm to warm proximal to distal bilateral.    Neurological:      Mental Status: She is alert and oriented to person, place, and time.      Sensory: No sensory deficit.      Motor: No weakness.      Comments: Gross sensation intact bilateral.    Psychiatric:         Mood and Affect: Mood normal.         Thought Content: Thought content normal.               Assessment:       Encounter Diagnosis   Name Primary?    Post-operative state Yes         Plan:       Krystle was seen today for wound check.    Diagnoses and all orders for this visit:    Post-operative state    Ms. Krystle Austin is a 49 y.o female who is now 2 weeks s/p right foot bunionectomy and tailor's bunionectomy. Overall doing well with improvement in pain and swelling. Still with residual post operative swelling.     I counseled the patient on her conditions, their implications and medical management.    Continue icing, elevating, and compression. Ok to shower and remove dressings to right foot but avoid soaking, lotions. Begin passive ROM exercises at home. Continue partial weightbearing to right heel in DARCO shoe and knee roller.     RTC in 1 week for follow up.    Patient seen and assessed by resident Kayla Rico PGY2 under the direct  supervision of attending Dr. Muir.

## 2020-08-14 NOTE — PROGRESS NOTES
Subjective:      Patient ID: Krystle Austin is a 49 y.o. female.    Chief Complaint: Follow-up (post op )    Pt presents today POD #7 POV #1 s/p Agrawal bunionectomy and removal of 5th met eminence, right foot. Pt denies any NVFCSOB. Pt states she has been elevating the foot and NWB in a knee roller.     Review of Systems   Constitution: Negative for chills, fever and malaise/fatigue.   HENT: Negative for hearing loss.    Cardiovascular: Negative for claudication.   Respiratory: Negative for shortness of breath.    Skin: Negative for flushing and rash.   Musculoskeletal: Negative for joint pain and myalgias.   Neurological: Negative for loss of balance, numbness, paresthesias and sensory change.   Psychiatric/Behavioral: Negative for altered mental status.           Objective:      Physical Exam  Cardiovascular:      Pulses:           Dorsalis pedis pulses are 2+ on the right side and 2+ on the left side.        Posterior tibial pulses are 2+ on the right side and 2+ on the left side.   Musculoskeletal:      Comments: Hallux rectus, 5th met head eminence gone.    Feet:      Right foot:      Protective Sensation: 5 sites tested. 5 sites sensed.      Left foot:      Protective Sensation: 5 sites tested. 5 sites sensed.   Skin:     Comments: Suture line co-apted and intact  Minimal localized edema to surgical sites  No drainage  No increase in temp  No SOI.    Neurological:      Comments: Intact gross sensation noted to b/L LEs               Assessment:       Encounter Diagnosis   Name Primary?    Post-operative state - Right Foot Yes         Plan:       Krystle was seen today for follow-up.    Diagnoses and all orders for this visit:    Post-operative state - Right Foot      I counseled the patient on her conditions, their implications and medical management.      Steri strips left intact. Incisions swabbed with betadine  DSD applied to pts right foot. Pt advised to continue to keep dressing CDI, and NWB via knee  shanel  Pt advised on RICE and OTC NSAIDs for associated pain.   RTC in 1 week or sooner if any new pedal problems arise, any signs of infection occur, or if condition worsens.     .

## 2020-08-20 ENCOUNTER — OFFICE VISIT (OUTPATIENT)
Dept: PODIATRY | Facility: CLINIC | Age: 50
End: 2020-08-20
Payer: COMMERCIAL

## 2020-08-20 VITALS
DIASTOLIC BLOOD PRESSURE: 86 MMHG | WEIGHT: 199.94 LBS | HEIGHT: 63 IN | HEART RATE: 64 BPM | BODY MASS INDEX: 35.43 KG/M2 | SYSTOLIC BLOOD PRESSURE: 156 MMHG

## 2020-08-20 DIAGNOSIS — Z98.890 POST-OPERATIVE STATE: Primary | ICD-10-CM

## 2020-08-20 PROCEDURE — 99999 PR PBB SHADOW E&M-EST. PATIENT-LVL III: ICD-10-PCS | Mod: PBBFAC,,, | Performed by: PODIATRIST

## 2020-08-20 PROCEDURE — 99999 PR PBB SHADOW E&M-EST. PATIENT-LVL III: CPT | Mod: PBBFAC,,, | Performed by: PODIATRIST

## 2020-08-20 PROCEDURE — 99024 PR POST-OP FOLLOW-UP VISIT: ICD-10-PCS | Mod: S$GLB,,, | Performed by: PODIATRIST

## 2020-08-20 PROCEDURE — 99024 POSTOP FOLLOW-UP VISIT: CPT | Mod: S$GLB,,, | Performed by: PODIATRIST

## 2020-08-31 NOTE — PROGRESS NOTES
Subjective:      Patient ID: Krystle Austin is a 49 y.o. female.    Chief Complaint: Follow-up (right foot )    Krystle Austin is a 49 y.o. female who has a past medical history of Endometriosis, Hypertension, who presents to clinic for post op evaluation. s/p right foot bunionectomy and tailor's bunionectomy (DOS:7/30/2020). Post op pain has improved but she still needs to take prescribe pain meds. Pt is c/o swelling. Denies N/V/F/C.           Review of Systems   Constitution: Negative for chills, diaphoresis, fever and malaise/fatigue.   HENT: Negative for hearing loss.    Eyes: Negative for pain.   Cardiovascular: Positive for leg swelling. Negative for claudication.   Respiratory: Negative for cough and shortness of breath.    Skin: Negative for dry skin, flushing, nail changes, poor wound healing, rash and unusual hair distribution.   Musculoskeletal: Negative for arthritis, joint pain and myalgias.   Gastrointestinal: Negative for diarrhea, nausea and vomiting.   Neurological: Negative for loss of balance, numbness, paresthesias and sensory change.   Psychiatric/Behavioral: Negative for altered mental status.           Objective:      Physical Exam  Vitals signs reviewed.   Constitutional:       General: She is not in acute distress.  HENT:      Head: Normocephalic and atraumatic.   Neck:      Musculoskeletal: Normal range of motion.   Cardiovascular:      Rate and Rhythm: Normal rate.      Pulses:           Dorsalis pedis pulses are 2+ on the right side and 2+ on the left side.        Posterior tibial pulses are 2+ on the right side and 2+ on the left side.   Pulmonary:      Effort: Pulmonary effort is normal.   Musculoskeletal:         General: Tenderness present. No signs of injury.      Comments: 5/5 muscle strength for all muscles crossing at the ankle joint bilateral    Ankle, STJ, midtarsal, WNL b/l. No pain or crepitus with ROM left foot. 1st MTPJ right ROM limited 2/2 pain.     +Tenderness to  surgical site at dorsum of 1st metatarsal and 5th metatarsal right foot.    Feet:      Left foot:      Skin integrity: Skin integrity normal.      Comments: Surgical site x 2 c/d/i right foot. No signs of wound dehiscence, no surrounding erythema, no eccymosis, no drainage. No underlying signs of infection. +Edema surrounding surgical site.     Nails x 10 well trimmed.   Skin:     General: Skin is warm and dry.      Capillary Refill: Capillary refill takes 2 to 3 seconds.      Findings: No bruising or erythema.      Comments: Normal turgor bilateral. Skin warm to warm proximal to distal bilateral.    Neurological:      Mental Status: She is alert and oriented to person, place, and time.      Sensory: No sensory deficit.      Motor: No weakness.      Comments: Gross sensation intact bilateral.    Psychiatric:         Mood and Affect: Mood normal.         Thought Content: Thought content normal.               Assessment:       Encounter Diagnosis   Name Primary?    Post-operative state Yes         Plan:       Krystle was seen today for follow-up.    Diagnoses and all orders for this visit:    Post-operative state    Ms. Krystle Austin is a 49 y.o female who is now 2 weeks s/p right foot bunionectomy and tailor's bunionectomy. Overall doing well with improvement in pain and swelling. Still with residual post operative swelling.     I counseled the patient on her conditions, their implications and medical management.    Continue icing, elevating, and compression.  Ice and Compression expressed.   RTC in 1 week or sooner if any new pedal problems arise, any signs of infection occur, or if condition worsens.

## 2020-09-17 ENCOUNTER — PATIENT MESSAGE (OUTPATIENT)
Dept: PODIATRY | Facility: CLINIC | Age: 50
End: 2020-09-17

## 2020-10-14 ENCOUNTER — OFFICE VISIT (OUTPATIENT)
Dept: INTERNAL MEDICINE | Facility: CLINIC | Age: 50
End: 2020-10-14
Payer: COMMERCIAL

## 2020-10-14 ENCOUNTER — IMMUNIZATION (OUTPATIENT)
Dept: INTERNAL MEDICINE | Facility: CLINIC | Age: 50
End: 2020-10-14
Payer: COMMERCIAL

## 2020-10-14 VITALS
OXYGEN SATURATION: 99 % | HEART RATE: 66 BPM | DIASTOLIC BLOOD PRESSURE: 84 MMHG | WEIGHT: 203.69 LBS | HEIGHT: 63 IN | SYSTOLIC BLOOD PRESSURE: 122 MMHG | BODY MASS INDEX: 36.09 KG/M2

## 2020-10-14 DIAGNOSIS — F32.A ANXIETY AND DEPRESSION: ICD-10-CM

## 2020-10-14 DIAGNOSIS — R22.41 LOCALIZED SWELLING OF RIGHT FOOT: ICD-10-CM

## 2020-10-14 DIAGNOSIS — Z00.00 ANNUAL PHYSICAL EXAM: Primary | ICD-10-CM

## 2020-10-14 DIAGNOSIS — I10 HYPERTENSION, UNSPECIFIED TYPE: ICD-10-CM

## 2020-10-14 DIAGNOSIS — F41.9 ANXIETY AND DEPRESSION: ICD-10-CM

## 2020-10-14 PROCEDURE — 90686 IIV4 VACC NO PRSV 0.5 ML IM: CPT | Mod: S$GLB,,, | Performed by: INTERNAL MEDICINE

## 2020-10-14 PROCEDURE — 90686 FLU VACCINE (QUAD) GREATER THAN OR EQUAL TO 3YO PRESERVATIVE FREE IM: ICD-10-PCS | Mod: S$GLB,,, | Performed by: INTERNAL MEDICINE

## 2020-10-14 PROCEDURE — 3074F PR MOST RECENT SYSTOLIC BLOOD PRESSURE < 130 MM HG: ICD-10-PCS | Mod: CPTII,S$GLB,, | Performed by: NURSE PRACTITIONER

## 2020-10-14 PROCEDURE — 3008F PR BODY MASS INDEX (BMI) DOCUMENTED: ICD-10-PCS | Mod: CPTII,S$GLB,, | Performed by: NURSE PRACTITIONER

## 2020-10-14 PROCEDURE — 90471 FLU VACCINE (QUAD) GREATER THAN OR EQUAL TO 3YO PRESERVATIVE FREE IM: ICD-10-PCS | Mod: S$GLB,,, | Performed by: INTERNAL MEDICINE

## 2020-10-14 PROCEDURE — 90471 IMMUNIZATION ADMIN: CPT | Mod: S$GLB,,, | Performed by: INTERNAL MEDICINE

## 2020-10-14 PROCEDURE — 3008F BODY MASS INDEX DOCD: CPT | Mod: CPTII,S$GLB,, | Performed by: NURSE PRACTITIONER

## 2020-10-14 PROCEDURE — 99999 PR PBB SHADOW E&M-EST. PATIENT-LVL III: ICD-10-PCS | Mod: PBBFAC,,, | Performed by: NURSE PRACTITIONER

## 2020-10-14 PROCEDURE — 3079F DIAST BP 80-89 MM HG: CPT | Mod: CPTII,S$GLB,, | Performed by: NURSE PRACTITIONER

## 2020-10-14 PROCEDURE — 3079F PR MOST RECENT DIASTOLIC BLOOD PRESSURE 80-89 MM HG: ICD-10-PCS | Mod: CPTII,S$GLB,, | Performed by: NURSE PRACTITIONER

## 2020-10-14 PROCEDURE — 99396 PR PREVENTIVE VISIT,EST,40-64: ICD-10-PCS | Mod: S$GLB,,, | Performed by: NURSE PRACTITIONER

## 2020-10-14 PROCEDURE — 3074F SYST BP LT 130 MM HG: CPT | Mod: CPTII,S$GLB,, | Performed by: NURSE PRACTITIONER

## 2020-10-14 PROCEDURE — 99999 PR PBB SHADOW E&M-EST. PATIENT-LVL III: CPT | Mod: PBBFAC,,, | Performed by: NURSE PRACTITIONER

## 2020-10-14 PROCEDURE — 99396 PREV VISIT EST AGE 40-64: CPT | Mod: S$GLB,,, | Performed by: NURSE PRACTITIONER

## 2020-10-14 RX ORDER — SERTRALINE HYDROCHLORIDE 50 MG/1
100 TABLET, FILM COATED ORAL DAILY
Qty: 30 TABLET | Refills: 6 | Status: SHIPPED | OUTPATIENT
Start: 2020-10-14 | End: 2020-10-14 | Stop reason: SDUPTHER

## 2020-10-14 RX ORDER — SERTRALINE HYDROCHLORIDE 100 MG/1
100 TABLET, FILM COATED ORAL DAILY
Qty: 30 TABLET | Refills: 5 | Status: SHIPPED | OUTPATIENT
Start: 2020-10-14 | End: 2021-04-21 | Stop reason: SDUPTHER

## 2020-10-14 RX ORDER — CHLORTHALIDONE 25 MG/1
25 TABLET ORAL DAILY
Qty: 30 TABLET | Refills: 0 | Status: SHIPPED | OUTPATIENT
Start: 2020-10-14 | End: 2022-11-09 | Stop reason: SDUPTHER

## 2020-10-14 NOTE — PROGRESS NOTES
Internal Medicine Annual Exam       CHIEF COMPLAINT     The patient, Krystle Austin, who is a 49 y.o. female presents for an annual exam.    HPI     Here for annual exam - last AP 8/13/2019    Was seen for pre-op 7/8/2020 - s/p bunionectomy   Surgery went well has continued postop selling in the right foot. Has treated with liquid tumeric, elevation, compression stocking.   Will resolve intermittently but returns.     HTN- taking propranolol      Depression and anxiety- taking zoloft   Pt tearful at visit. Son is deploying to [facility] on Sunday     -   MMG- 1/4/2020  Tdap- utd   Flu- today   Lipids- 2019     Exercise- walks 1-2 miles 2-3 times per week. Without SOB or chest pain. Able to perform house work without sob or chest pain     Past Medical History:  Past Medical History:   Diagnosis Date    Endometriosis     Hypertension     PONV (postoperative nausea and vomiting)        Past Surgical History:   Procedure Laterality Date    BUNIONECTOMY Right 7/30/2020    Procedure: BUNIONECTOMY;  Surgeon: Rajinder Muir DPM;  Location: [facility] OR [address] [place] [address];  Service: Podiatry;  Laterality: Right;    HYSTERECTOMY  05/2008    KNEE CARTILAGE SURGERY Left     MEGHANA BUNIONECTOMY Right 7/30/2020    Procedure: EXCISION, BUNIONETTE;  Surgeon: Rajinder Muir DPM;  Location: [facility] OR [address] [place] [address];  Service: Podiatry;  Laterality: Right;    TONSILLECTOMY  All I can remember is I was in 4th grade.        Family History   Problem Relation Age of Onset    Alcohol abuse Maternal Grandmother     Cancer Maternal Grandmother     Asthma Son     Cancer Mother     COPD Mother 61        lung     Learning disabilities Son     Miscarriages / Stillbirths Sister         Social History     Socioeconomic History    Marital status:      Spouse name: Not on file    Number of children: Not on file    Years of education: Not on file    Highest education level: Not on file   Occupational History    Not on file   Social  Needs    Financial resource strain: Not hard at all    Food insecurity     Worry: Never true     Inability: Never true    Transportation needs     Medical: No     Non-medical: No   Tobacco Use    Smoking status: Never Smoker    Smokeless tobacco: Never Used   Substance and Sexual Activity    Alcohol use: Not Currently     Frequency: Monthly or less     Drinks per session: 1 or 2     Binge frequency: Never     Comment: I probably have 2 drinks 3 times in a calendar year.    Drug use: Never    Sexual activity: Yes     Partners: Male     Birth control/protection: None   Lifestyle    Physical activity     Days per week: 5 days     Minutes per session: 10 min    Stress: Only a little   Relationships    Social connections     Talks on phone: More than three times a week     Gets together: Three times a week     Attends Yazidism service: Not on file     Active member of club or organization: No     Attends meetings of clubs or organizations: Never     Relationship status:    Other Topics Concern    Not on file   Social History Narrative    Not on file        Social History     Tobacco Use   Smoking Status Never Smoker   Smokeless Tobacco Never Used        Allergies as of 10/14/2020 - Reviewed 08/20/2020   Allergen Reaction Noted    Oxycodone-acetaminophen Hives 08/13/2019          Home Medications:  Prior to Admission medications    Medication Sig Start Date End Date Taking? Authorizing Provider   calcium carbonate (OS-STEFANIE) 600 mg calcium (1,500 mg) Tab Take 600 mg by mouth once.    Historical Provider   estradiol (ESTRACE) 1 MG tablet Take 1 mg by mouth once daily. 7/22/19   Historical Provider   multivit-min/iron/folic/[id number] (HAIR, SKIN AND NAILS ADVANCED ORAL) Take by mouth.    Historical Provider   multivitamin with minerals tablet Take 1 tablet by mouth once daily.    Historical Provider   propranoloL (INDERAL) 20 MG tablet Take 1 tablet (20 mg total) by mouth once daily. 8/18/20   Caitlin  SOBIA King   sertraline (ZOLOFT) 50 MG tablet Take 50 mg by mouth once daily.    Historical Provider       Review of Systems:  Review of Systems   Constitutional: Negative for activity change and unexpected weight change.   HENT: Negative for hearing loss, rhinorrhea and trouble swallowing.    Eyes: Negative for discharge and visual disturbance.   Respiratory: Negative for cough, chest tightness, shortness of breath and wheezing.    Cardiovascular: Negative for chest pain, palpitations and leg swelling.   Gastrointestinal: Negative for blood in stool, constipation, diarrhea and vomiting.   Endocrine: Negative for polydipsia and polyuria.   Genitourinary: Negative for difficulty urinating, dysuria, hematuria and menstrual problem.   Musculoskeletal: Positive for joint swelling (right foot ). Negative for arthralgias and neck pain.   Neurological: Positive for headaches (stress related - see HPI ). Negative for weakness.   Psychiatric/Behavioral: Positive for dysphoric mood (see HPI ). Negative for confusion.       Health Maintainence:   Immunizations:  Health Maintenance       Date Due Completion Date    Hepatitis C Screening 1971 ---    HIV Screening 11/29/1986 ---    TETANUS VACCINE 11/29/1989 ---    Influenza Vaccine (1) 08/01/2020 11/27/2019    Mammogram 01/04/2022 1/4/2020    Override on 7/9/2019: Done    Lipid Panel 08/13/2024 8/13/2019           PHYSICAL EXAM     There were no vitals taken for this visit. There is no height or weight on file to calculate BMI.    Physical Exam  Vitals signs reviewed.   Constitutional:       Appearance: She is well-developed.   HENT:      Head: Normocephalic.      Right Ear: External ear normal.      Left Ear: External ear normal.      Nose: Nose normal.      Mouth/Throat:      Pharynx: No oropharyngeal exudate.   Eyes:      Pupils: Pupils are equal, round, and reactive to light.   Neck:      Musculoskeletal: Neck supple.      Thyroid: No thyromegaly.      Vascular:  No JVD.      Trachea: No tracheal deviation.   Cardiovascular:      Rate and Rhythm: Normal rate and regular rhythm.      Heart sounds: Normal heart sounds. No murmur. No friction rub. No gallop.    Pulmonary:      Effort: Pulmonary effort is normal. No respiratory distress.      Breath sounds: Normal breath sounds. No wheezing or rales.   Abdominal:      General: Bowel sounds are normal. There is no distension.      Palpations: Abdomen is soft.      Tenderness: There is no abdominal tenderness.   Musculoskeletal: Normal range of motion.         General: No tenderness.      Right foot: Normal range of motion and normal capillary refill (generalized - nonpitting ). Swelling present. No tenderness, bony tenderness, crepitus, deformity or laceration.        Feet:    Lymphadenopathy:      Cervical: No cervical adenopathy.   Skin:     General: Skin is warm and dry.      Findings: No rash.   Neurological:      Mental Status: She is alert and oriented to person, place, and time.   Psychiatric:         Behavior: Behavior normal.         LABS     No results found for: LABA1C, HGBA1C  CMP  Sodium   Date Value Ref Range Status   07/08/2020 141 136 - 145 mmol/L Final     Potassium   Date Value Ref Range Status   07/08/2020 4.3 3.5 - 5.1 mmol/L Final     Chloride   Date Value Ref Range Status   07/08/2020 105 95 - 110 mmol/L Final     CO2   Date Value Ref Range Status   07/08/2020 27 23 - 29 mmol/L Final     Glucose   Date Value Ref Range Status   07/08/2020 92 70 - 110 mg/dL Final     BUN, Bld   Date Value Ref Range Status   07/08/2020 12 6 - 20 mg/dL Final     Creatinine   Date Value Ref Range Status   07/08/2020 0.7 0.5 - 1.4 mg/dL Final     Calcium   Date Value Ref Range Status   07/08/2020 9.0 8.7 - 10.5 mg/dL Final     Total Protein   Date Value Ref Range Status   07/08/2020 7.7 6.0 - 8.4 g/dL Final     Albumin   Date Value Ref Range Status   07/08/2020 3.7 3.5 - 5.2 g/dL Final     Total Bilirubin   Date Value Ref Range  Status   07/08/2020 0.3 0.1 - 1.0 mg/dL Final     Comment:     For infants and newborns, interpretation of results should be based  on gestational age, weight and in agreement with clinical  observations.  Premature Infant recommended reference ranges:  Up to 24 hours.............<8.0 mg/dL  Up to 48 hours............<12.0 mg/dL  3-5 days..................<15.0 mg/dL  6-29 days.................<15.0 mg/dL       Alkaline Phosphatase   Date Value Ref Range Status   07/08/2020 85 55 - 135 U/L Final     AST   Date Value Ref Range Status   07/08/2020 17 10 - 40 U/L Final     ALT   Date Value Ref Range Status   07/08/2020 13 10 - 44 U/L Final     Anion Gap   Date Value Ref Range Status   07/08/2020 9 8 - 16 mmol/L Final     eGFR if    Date Value Ref Range Status   07/08/2020 >60.0 >60 mL/min/1.73 m^2 Final     eGFR if non    Date Value Ref Range Status   07/08/2020 >60.0 >60 mL/min/1.73 m^2 Final     Comment:     Calculation used to obtain the estimated glomerular filtration  rate (eGFR) is the CKD-EPI equation.        Lab Results   Component Value Date    WBC 9.06 07/08/2020    HGB 14.0 07/08/2020    HCT 44.8 07/08/2020    MCV 88 07/08/2020     07/08/2020     Lab Results   Component Value Date    CHOL 155 08/13/2019     Lab Results   Component Value Date    HDL 51 08/13/2019     Lab Results   Component Value Date    LDLCALC 86.0 08/13/2019     Lab Results   Component Value Date    TRIG 90 08/13/2019     Lab Results   Component Value Date    CHOLHDL 32.9 08/13/2019     Lab Results   Component Value Date    TSH 1.357 08/13/2019       ASSESSMENT/PLAN     Krystle Austin is a 49 y.o. female    Annual physical exam- all age and gender related screenings discussed.     Hypertension, unspecified type- at goal. Will cont proranolol. Add chlorthalidone, see below.   -     chlorthalidone (HYGROTEN) 25 MG Tab; Take 1 tablet (25 mg total) by mouth once daily.  Dispense: 30 tablet; Refill:  0    Anxiety and depression- poorly controlled. Recent increase in stress surrounding son's deployment. Will increase to 100mg zoloft.   -     Discontinue: sertraline (ZOLOFT) 50 MG tablet; Take 2 tablets (100 mg total) by mouth once daily.  Dispense: 30 tablet; Refill: 6  -     sertraline (ZOLOFT) 100 MG tablet; Take 1 tablet (100 mg total) by mouth once daily.  Dispense: 30 tablet; Refill: 5    Localized swelling of right foot- likely lymphedema vs post-op. Cont compression stockings, elevation and will add chlorthalidone x 1-2 weeks. Low na diet.     Follow up with PCP in 6 months to establish/follow up     Caitlin Aguilar-Vic WALKER, ANIL, FNP-c   Department of Internal Medicine - [facility]  1:19 PM

## 2020-10-21 ENCOUNTER — PATIENT MESSAGE (OUTPATIENT)
Dept: INTERNAL MEDICINE | Facility: CLINIC | Age: 50
End: 2020-10-21

## 2021-01-14 ENCOUNTER — PATIENT MESSAGE (OUTPATIENT)
Dept: INTERNAL MEDICINE | Facility: CLINIC | Age: 51
End: 2021-01-14

## 2021-03-26 ENCOUNTER — IMMUNIZATION (OUTPATIENT)
Dept: PRIMARY CARE CLINIC | Facility: CLINIC | Age: 51
End: 2021-03-26
Payer: COMMERCIAL

## 2021-03-26 DIAGNOSIS — Z23 NEED FOR VACCINATION: Primary | ICD-10-CM

## 2021-03-26 PROCEDURE — 0001A PR IMMUNIZ ADMIN, SARS-COV-2 COVID-19 VACC, 30MCG/0.3ML, 1ST DOSE: ICD-10-PCS | Mod: CV19,S$GLB,, | Performed by: INTERNAL MEDICINE

## 2021-03-26 PROCEDURE — 91300 PR SARS-COV- 2 COVID-19 VACCINE, NO PRSV, 30MCG/0.3ML, IM: CPT | Mod: S$GLB,,, | Performed by: INTERNAL MEDICINE

## 2021-03-26 PROCEDURE — 0001A PR IMMUNIZ ADMIN, SARS-COV-2 COVID-19 VACC, 30MCG/0.3ML, 1ST DOSE: CPT | Mod: CV19,S$GLB,, | Performed by: INTERNAL MEDICINE

## 2021-03-26 PROCEDURE — 91300 PR SARS-COV- 2 COVID-19 VACCINE, NO PRSV, 30MCG/0.3ML, IM: ICD-10-PCS | Mod: S$GLB,,, | Performed by: INTERNAL MEDICINE

## 2021-03-26 RX ADMIN — Medication 0.3 ML: at 05:03

## 2021-04-18 ENCOUNTER — IMMUNIZATION (OUTPATIENT)
Dept: PRIMARY CARE CLINIC | Facility: CLINIC | Age: 51
End: 2021-04-18
Payer: COMMERCIAL

## 2021-04-18 DIAGNOSIS — Z23 NEED FOR VACCINATION: Primary | ICD-10-CM

## 2021-04-18 PROCEDURE — 91300 PR SARS-COV- 2 COVID-19 VACCINE, NO PRSV, 30MCG/0.3ML, IM: ICD-10-PCS | Mod: S$GLB,,, | Performed by: INTERNAL MEDICINE

## 2021-04-18 PROCEDURE — 0002A PR IMMUNIZ ADMIN, SARS-COV-2 COVID-19 VACC, 30MCG/0.3ML, 2ND DOSE: ICD-10-PCS | Mod: CV19,S$GLB,, | Performed by: INTERNAL MEDICINE

## 2021-04-18 PROCEDURE — 0002A PR IMMUNIZ ADMIN, SARS-COV-2 COVID-19 VACC, 30MCG/0.3ML, 2ND DOSE: CPT | Mod: CV19,S$GLB,, | Performed by: INTERNAL MEDICINE

## 2021-04-18 PROCEDURE — 91300 PR SARS-COV- 2 COVID-19 VACCINE, NO PRSV, 30MCG/0.3ML, IM: CPT | Mod: S$GLB,,, | Performed by: INTERNAL MEDICINE

## 2021-04-18 RX ADMIN — Medication 0.3 ML: at 03:04

## 2021-04-21 DIAGNOSIS — F41.9 ANXIETY AND DEPRESSION: ICD-10-CM

## 2021-04-21 DIAGNOSIS — F32.A ANXIETY AND DEPRESSION: ICD-10-CM

## 2021-04-21 RX ORDER — SERTRALINE HYDROCHLORIDE 100 MG/1
100 TABLET, FILM COATED ORAL DAILY
Qty: 30 TABLET | Refills: 5 | Status: SHIPPED | OUTPATIENT
Start: 2021-04-21 | End: 2021-10-25

## 2021-06-02 ENCOUNTER — PATIENT MESSAGE (OUTPATIENT)
Dept: INTERNAL MEDICINE | Facility: CLINIC | Age: 51
End: 2021-06-02

## 2021-06-02 RX ORDER — VALACYCLOVIR HYDROCHLORIDE 1 G/1
1000 TABLET, FILM COATED ORAL 2 TIMES DAILY
Qty: 14 TABLET | Refills: 0 | Status: SHIPPED | OUTPATIENT
Start: 2021-06-02 | End: 2022-03-02

## 2021-09-10 ENCOUNTER — OFFICE VISIT (OUTPATIENT)
Dept: ORTHOPEDICS | Facility: CLINIC | Age: 51
End: 2021-09-10
Payer: COMMERCIAL

## 2021-09-10 ENCOUNTER — HOSPITAL ENCOUNTER (OUTPATIENT)
Dept: RADIOLOGY | Facility: HOSPITAL | Age: 51
Discharge: HOME | End: 2021-09-10
Attending: PHYSICIAN ASSISTANT
Payer: COMMERCIAL

## 2021-09-10 DIAGNOSIS — M25.561 ACUTE PAIN OF RIGHT KNEE: Primary | ICD-10-CM

## 2021-09-10 DIAGNOSIS — M25.561 ACUTE PAIN OF RIGHT KNEE: ICD-10-CM

## 2021-09-10 PROCEDURE — 99999 PR PBB SHADOW E&M-EST. PATIENT-LVL III: ICD-10-PCS | Mod: PBBFAC,,, | Performed by: PHYSICIAN ASSISTANT

## 2021-09-10 PROCEDURE — 1160F RVW MEDS BY RX/DR IN RCRD: CPT | Mod: CPTII,S$GLB,, | Performed by: PHYSICIAN ASSISTANT

## 2021-09-10 PROCEDURE — 73560 X-RAY EXAM OF KNEE 1 OR 2: CPT | Mod: TC,LT

## 2021-09-10 PROCEDURE — 99203 OFFICE O/P NEW LOW 30 MIN: CPT | Mod: S$GLB,,, | Performed by: PHYSICIAN ASSISTANT

## 2021-09-10 PROCEDURE — 99203 PR OFFICE/OUTPT VISIT, NEW, LEVL III, 30-44 MIN: ICD-10-PCS | Mod: S$GLB,,, | Performed by: PHYSICIAN ASSISTANT

## 2021-09-10 PROCEDURE — 99999 PR PBB SHADOW E&M-EST. PATIENT-LVL III: CPT | Mod: PBBFAC,,, | Performed by: PHYSICIAN ASSISTANT

## 2021-09-10 PROCEDURE — 1159F MED LIST DOCD IN RCRD: CPT | Mod: CPTII,S$GLB,, | Performed by: PHYSICIAN ASSISTANT

## 2021-09-10 PROCEDURE — 1160F PR REVIEW ALL MEDS BY PRESCRIBER/CLIN PHARMACIST DOCUMENTED: ICD-10-PCS | Mod: CPTII,S$GLB,, | Performed by: PHYSICIAN ASSISTANT

## 2021-09-10 PROCEDURE — 73562 X-RAY EXAM OF KNEE 3: CPT | Mod: 26,RT,, | Performed by: RADIOLOGY

## 2021-09-10 PROCEDURE — 1159F PR MEDICATION LIST DOCUMENTED IN MEDICAL RECORD: ICD-10-PCS | Mod: CPTII,S$GLB,, | Performed by: PHYSICIAN ASSISTANT

## 2021-09-10 PROCEDURE — 73562 XR KNEE ORTHO RIGHT: ICD-10-PCS | Mod: 26,RT,, | Performed by: RADIOLOGY

## 2021-09-10 RX ORDER — MELOXICAM 15 MG/1
15 TABLET ORAL DAILY
Qty: 30 TABLET | Refills: 1 | Status: SHIPPED | OUTPATIENT
Start: 2021-09-10 | End: 2022-11-09

## 2021-10-22 ENCOUNTER — IMMUNIZATION (OUTPATIENT)
Dept: INTERNAL MEDICINE | Facility: CLINIC | Age: 51
End: 2021-10-22
Payer: COMMERCIAL

## 2021-10-22 ENCOUNTER — OFFICE VISIT (OUTPATIENT)
Dept: INTERNAL MEDICINE | Facility: CLINIC | Age: 51
End: 2021-10-22
Payer: COMMERCIAL

## 2021-10-22 VITALS
SYSTOLIC BLOOD PRESSURE: 130 MMHG | HEART RATE: 60 BPM | OXYGEN SATURATION: 98 % | HEIGHT: 63 IN | BODY MASS INDEX: 30.35 KG/M2 | WEIGHT: 171.31 LBS | DIASTOLIC BLOOD PRESSURE: 80 MMHG

## 2021-10-22 DIAGNOSIS — F32.A ANXIETY AND DEPRESSION: ICD-10-CM

## 2021-10-22 DIAGNOSIS — I10 HYPERTENSION, UNSPECIFIED TYPE: ICD-10-CM

## 2021-10-22 DIAGNOSIS — Z12.11 ENCOUNTER FOR COLORECTAL CANCER SCREENING: ICD-10-CM

## 2021-10-22 DIAGNOSIS — Z00.00 ANNUAL PHYSICAL EXAM: Primary | ICD-10-CM

## 2021-10-22 DIAGNOSIS — F41.9 ANXIETY AND DEPRESSION: ICD-10-CM

## 2021-10-22 DIAGNOSIS — Z12.12 ENCOUNTER FOR COLORECTAL CANCER SCREENING: ICD-10-CM

## 2021-10-22 PROCEDURE — 3079F DIAST BP 80-89 MM HG: CPT | Mod: CPTII,S$GLB,, | Performed by: NURSE PRACTITIONER

## 2021-10-22 PROCEDURE — 3008F PR BODY MASS INDEX (BMI) DOCUMENTED: ICD-10-PCS | Mod: CPTII,S$GLB,, | Performed by: NURSE PRACTITIONER

## 2021-10-22 PROCEDURE — 3075F SYST BP GE 130 - 139MM HG: CPT | Mod: CPTII,S$GLB,, | Performed by: NURSE PRACTITIONER

## 2021-10-22 PROCEDURE — 90471 IMMUNIZATION ADMIN: CPT | Mod: S$GLB,,, | Performed by: NURSE PRACTITIONER

## 2021-10-22 PROCEDURE — 90686 FLU VACCINE (QUAD) GREATER THAN OR EQUAL TO 3YO PRESERVATIVE FREE IM: ICD-10-PCS | Mod: S$GLB,,, | Performed by: NURSE PRACTITIONER

## 2021-10-22 PROCEDURE — 3075F PR MOST RECENT SYSTOLIC BLOOD PRESS GE 130-139MM HG: ICD-10-PCS | Mod: CPTII,S$GLB,, | Performed by: NURSE PRACTITIONER

## 2021-10-22 PROCEDURE — 99396 PR PREVENTIVE VISIT,EST,40-64: ICD-10-PCS | Mod: 25,S$GLB,, | Performed by: NURSE PRACTITIONER

## 2021-10-22 PROCEDURE — 1159F PR MEDICATION LIST DOCUMENTED IN MEDICAL RECORD: ICD-10-PCS | Mod: CPTII,S$GLB,, | Performed by: NURSE PRACTITIONER

## 2021-10-22 PROCEDURE — 90471 FLU VACCINE (QUAD) GREATER THAN OR EQUAL TO 3YO PRESERVATIVE FREE IM: ICD-10-PCS | Mod: S$GLB,,, | Performed by: NURSE PRACTITIONER

## 2021-10-22 PROCEDURE — 3008F BODY MASS INDEX DOCD: CPT | Mod: CPTII,S$GLB,, | Performed by: NURSE PRACTITIONER

## 2021-10-22 PROCEDURE — 99999 PR PBB SHADOW E&M-EST. PATIENT-LVL IV: ICD-10-PCS | Mod: PBBFAC,,, | Performed by: NURSE PRACTITIONER

## 2021-10-22 PROCEDURE — 1159F MED LIST DOCD IN RCRD: CPT | Mod: CPTII,S$GLB,, | Performed by: NURSE PRACTITIONER

## 2021-10-22 PROCEDURE — 90686 IIV4 VACC NO PRSV 0.5 ML IM: CPT | Mod: S$GLB,,, | Performed by: NURSE PRACTITIONER

## 2021-10-22 PROCEDURE — 99396 PREV VISIT EST AGE 40-64: CPT | Mod: 25,S$GLB,, | Performed by: NURSE PRACTITIONER

## 2021-10-22 PROCEDURE — 3079F PR MOST RECENT DIASTOLIC BLOOD PRESSURE 80-89 MM HG: ICD-10-PCS | Mod: CPTII,S$GLB,, | Performed by: NURSE PRACTITIONER

## 2021-10-22 PROCEDURE — 99999 PR PBB SHADOW E&M-EST. PATIENT-LVL IV: CPT | Mod: PBBFAC,,, | Performed by: NURSE PRACTITIONER

## 2021-10-22 RX ORDER — ACETAMINOPHEN AND PHENYLEPHRINE HCL 325; 5 MG/1; MG/1
TABLET ORAL
COMMUNITY
End: 2023-09-20

## 2021-10-22 RX ORDER — ESTERIFIED ESTROGEN AND METHYLTESTOSTERONE 1.25; 2.5 MG/1; MG/1
1 TABLET ORAL DAILY
COMMUNITY
Start: 2021-09-20 | End: 2023-02-07 | Stop reason: ALTCHOICE

## 2021-10-25 DIAGNOSIS — F41.9 ANXIETY AND DEPRESSION: ICD-10-CM

## 2021-10-25 DIAGNOSIS — F32.A ANXIETY AND DEPRESSION: ICD-10-CM

## 2021-10-25 RX ORDER — SERTRALINE HYDROCHLORIDE 100 MG/1
100 TABLET, FILM COATED ORAL DAILY
Qty: 30 TABLET | Refills: 0 | Status: SHIPPED | OUTPATIENT
Start: 2021-10-25 | End: 2021-11-26

## 2021-10-26 ENCOUNTER — LAB VISIT (OUTPATIENT)
Dept: LAB | Facility: HOSPITAL | Age: 51
End: 2021-10-26
Payer: COMMERCIAL

## 2021-10-26 DIAGNOSIS — Z00.00 ANNUAL PHYSICAL EXAM: ICD-10-CM

## 2021-10-26 LAB
ALBUMIN SERPL BCP-MCNC: 3.9 G/DL (ref 3.5–5.2)
ALP SERPL-CCNC: 57 U/L (ref 55–135)
ALT SERPL W/O P-5'-P-CCNC: 14 U/L (ref 10–44)
ANION GAP SERPL CALC-SCNC: 9 MMOL/L (ref 8–16)
AST SERPL-CCNC: 17 U/L (ref 10–40)
BASOPHILS # BLD AUTO: 0.03 K/UL (ref 0–0.2)
BASOPHILS NFR BLD: 0.4 % (ref 0–1.9)
BILIRUB SERPL-MCNC: 0.7 MG/DL (ref 0.1–1)
BUN SERPL-MCNC: 12 MG/DL (ref 6–20)
CALCIUM SERPL-MCNC: 9.3 MG/DL (ref 8.7–10.5)
CHLORIDE SERPL-SCNC: 103 MMOL/L (ref 95–110)
CHOLEST SERPL-MCNC: 162 MG/DL (ref 120–199)
CHOLEST/HDLC SERPL: 3.4 {RATIO} (ref 2–5)
CO2 SERPL-SCNC: 27 MMOL/L (ref 23–29)
CREAT SERPL-MCNC: 0.7 MG/DL (ref 0.5–1.4)
DIFFERENTIAL METHOD: NORMAL
EOSINOPHIL # BLD AUTO: 0.2 K/UL (ref 0–0.5)
EOSINOPHIL NFR BLD: 2.4 % (ref 0–8)
ERYTHROCYTE [DISTWIDTH] IN BLOOD BY AUTOMATED COUNT: 13.6 % (ref 11.5–14.5)
EST. GFR  (AFRICAN AMERICAN): >60 ML/MIN/1.73 M^2
EST. GFR  (NON AFRICAN AMERICAN): >60 ML/MIN/1.73 M^2
GLUCOSE SERPL-MCNC: 80 MG/DL (ref 70–110)
HCT VFR BLD AUTO: 43.8 % (ref 37–48.5)
HDLC SERPL-MCNC: 47 MG/DL (ref 40–75)
HDLC SERPL: 29 % (ref 20–50)
HGB BLD-MCNC: 14.3 G/DL (ref 12–16)
IMM GRANULOCYTES # BLD AUTO: 0.02 K/UL (ref 0–0.04)
IMM GRANULOCYTES NFR BLD AUTO: 0.3 % (ref 0–0.5)
LDLC SERPL CALC-MCNC: 104 MG/DL (ref 63–159)
LYMPHOCYTES # BLD AUTO: 1.5 K/UL (ref 1–4.8)
LYMPHOCYTES NFR BLD: 20.9 % (ref 18–48)
MCH RBC QN AUTO: 28.6 PG (ref 27–31)
MCHC RBC AUTO-ENTMCNC: 32.6 G/DL (ref 32–36)
MCV RBC AUTO: 88 FL (ref 82–98)
MONOCYTES # BLD AUTO: 0.6 K/UL (ref 0.3–1)
MONOCYTES NFR BLD: 7.9 % (ref 4–15)
NEUTROPHILS # BLD AUTO: 4.9 K/UL (ref 1.8–7.7)
NEUTROPHILS NFR BLD: 68.1 % (ref 38–73)
NONHDLC SERPL-MCNC: 115 MG/DL
NRBC BLD-RTO: 0 /100 WBC
PLATELET # BLD AUTO: 308 K/UL (ref 150–450)
PMV BLD AUTO: 11.2 FL (ref 9.2–12.9)
POTASSIUM SERPL-SCNC: 4 MMOL/L (ref 3.5–5.1)
PROT SERPL-MCNC: 7.3 G/DL (ref 6–8.4)
RBC # BLD AUTO: 5 M/UL (ref 4–5.4)
SODIUM SERPL-SCNC: 139 MMOL/L (ref 136–145)
TRIGL SERPL-MCNC: 55 MG/DL (ref 30–150)
TSH SERPL DL<=0.005 MIU/L-ACNC: 1.4 UIU/ML (ref 0.4–4)
WBC # BLD AUTO: 7.19 K/UL (ref 3.9–12.7)

## 2021-10-26 PROCEDURE — 85025 COMPLETE CBC W/AUTO DIFF WBC: CPT | Performed by: NURSE PRACTITIONER

## 2021-10-26 PROCEDURE — 84443 ASSAY THYROID STIM HORMONE: CPT | Performed by: NURSE PRACTITIONER

## 2021-10-26 PROCEDURE — 80061 LIPID PANEL: CPT | Performed by: NURSE PRACTITIONER

## 2021-10-26 PROCEDURE — 80053 COMPREHEN METABOLIC PANEL: CPT | Performed by: NURSE PRACTITIONER

## 2021-10-26 PROCEDURE — 36415 COLL VENOUS BLD VENIPUNCTURE: CPT | Performed by: NURSE PRACTITIONER

## 2022-01-04 ENCOUNTER — PATIENT MESSAGE (OUTPATIENT)
Dept: INTERNAL MEDICINE | Facility: CLINIC | Age: 52
End: 2022-01-04
Payer: COMMERCIAL

## 2022-01-04 DIAGNOSIS — F32.A ANXIETY AND DEPRESSION: ICD-10-CM

## 2022-01-04 DIAGNOSIS — F41.9 ANXIETY AND DEPRESSION: ICD-10-CM

## 2022-01-04 RX ORDER — SERTRALINE HYDROCHLORIDE 100 MG/1
100 TABLET, FILM COATED ORAL DAILY
Qty: 30 TABLET | Refills: 1 | Status: SHIPPED | OUTPATIENT
Start: 2022-01-04 | End: 2022-06-13

## 2022-01-04 RX ORDER — SERTRALINE HYDROCHLORIDE 100 MG/1
100 TABLET, FILM COATED ORAL DAILY
Qty: 30 TABLET | Refills: 3 | Status: SHIPPED | OUTPATIENT
Start: 2022-01-04 | End: 2022-06-13 | Stop reason: SDUPTHER

## 2022-05-03 ENCOUNTER — PATIENT MESSAGE (OUTPATIENT)
Dept: INTERNAL MEDICINE | Facility: CLINIC | Age: 52
End: 2022-05-03
Payer: COMMERCIAL

## 2022-06-21 ENCOUNTER — OFFICE VISIT (OUTPATIENT)
Dept: URGENT CARE | Facility: CLINIC | Age: 52
End: 2022-06-21
Payer: COMMERCIAL

## 2022-06-21 VITALS
WEIGHT: 170 LBS | RESPIRATION RATE: 16 BRPM | HEART RATE: 73 BPM | OXYGEN SATURATION: 96 % | HEIGHT: 63 IN | DIASTOLIC BLOOD PRESSURE: 89 MMHG | BODY MASS INDEX: 30.12 KG/M2 | SYSTOLIC BLOOD PRESSURE: 152 MMHG | TEMPERATURE: 98 F

## 2022-06-21 DIAGNOSIS — S89.91XA INJURY OF RIGHT KNEE, INITIAL ENCOUNTER: Primary | ICD-10-CM

## 2022-06-21 PROCEDURE — 1160F PR REVIEW ALL MEDS BY PRESCRIBER/CLIN PHARMACIST DOCUMENTED: ICD-10-PCS | Mod: CPTII,S$GLB,, | Performed by: STUDENT IN AN ORGANIZED HEALTH CARE EDUCATION/TRAINING PROGRAM

## 2022-06-21 PROCEDURE — 3008F BODY MASS INDEX DOCD: CPT | Mod: CPTII,S$GLB,, | Performed by: STUDENT IN AN ORGANIZED HEALTH CARE EDUCATION/TRAINING PROGRAM

## 2022-06-21 PROCEDURE — 3079F DIAST BP 80-89 MM HG: CPT | Mod: CPTII,S$GLB,, | Performed by: STUDENT IN AN ORGANIZED HEALTH CARE EDUCATION/TRAINING PROGRAM

## 2022-06-21 PROCEDURE — 1160F RVW MEDS BY RX/DR IN RCRD: CPT | Mod: CPTII,S$GLB,, | Performed by: STUDENT IN AN ORGANIZED HEALTH CARE EDUCATION/TRAINING PROGRAM

## 2022-06-21 PROCEDURE — 3077F SYST BP >= 140 MM HG: CPT | Mod: CPTII,S$GLB,, | Performed by: STUDENT IN AN ORGANIZED HEALTH CARE EDUCATION/TRAINING PROGRAM

## 2022-06-21 PROCEDURE — 3008F PR BODY MASS INDEX (BMI) DOCUMENTED: ICD-10-PCS | Mod: CPTII,S$GLB,, | Performed by: STUDENT IN AN ORGANIZED HEALTH CARE EDUCATION/TRAINING PROGRAM

## 2022-06-21 PROCEDURE — 99213 PR OFFICE/OUTPT VISIT, EST, LEVL III, 20-29 MIN: ICD-10-PCS | Mod: S$GLB,,, | Performed by: STUDENT IN AN ORGANIZED HEALTH CARE EDUCATION/TRAINING PROGRAM

## 2022-06-21 PROCEDURE — 73562 X-RAY EXAM OF KNEE 3: CPT | Mod: FY,RT,S$GLB, | Performed by: RADIOLOGY

## 2022-06-21 PROCEDURE — 1159F PR MEDICATION LIST DOCUMENTED IN MEDICAL RECORD: ICD-10-PCS | Mod: CPTII,S$GLB,, | Performed by: STUDENT IN AN ORGANIZED HEALTH CARE EDUCATION/TRAINING PROGRAM

## 2022-06-21 PROCEDURE — 1159F MED LIST DOCD IN RCRD: CPT | Mod: CPTII,S$GLB,, | Performed by: STUDENT IN AN ORGANIZED HEALTH CARE EDUCATION/TRAINING PROGRAM

## 2022-06-21 PROCEDURE — 73562 XR KNEE 3 VIEW RIGHT: ICD-10-PCS | Mod: FY,RT,S$GLB, | Performed by: RADIOLOGY

## 2022-06-21 PROCEDURE — 3077F PR MOST RECENT SYSTOLIC BLOOD PRESSURE >= 140 MM HG: ICD-10-PCS | Mod: CPTII,S$GLB,, | Performed by: STUDENT IN AN ORGANIZED HEALTH CARE EDUCATION/TRAINING PROGRAM

## 2022-06-21 PROCEDURE — 3079F PR MOST RECENT DIASTOLIC BLOOD PRESSURE 80-89 MM HG: ICD-10-PCS | Mod: CPTII,S$GLB,, | Performed by: STUDENT IN AN ORGANIZED HEALTH CARE EDUCATION/TRAINING PROGRAM

## 2022-06-21 PROCEDURE — 99213 OFFICE O/P EST LOW 20 MIN: CPT | Mod: S$GLB,,, | Performed by: STUDENT IN AN ORGANIZED HEALTH CARE EDUCATION/TRAINING PROGRAM

## 2022-06-22 NOTE — PROGRESS NOTES
"Subjective:       Patient ID: Krystle Austin is a 51 y.o. female.    Vitals:  height is 5' 3" (1.6 m) and weight is 77.1 kg (170 lb). Her temperature is 97.9 °F (36.6 °C). Her blood pressure is 152/89 (abnormal) and her pulse is 73. Her respiration is 16 and oxygen saturation is 96%.     Chief Complaint: Knee Injury (R)    Patient states last week she hit her lateral R knee with a 12 lb bowling ball.    Knee Injury  This is a new problem. The current episode started 1 to 4 weeks ago (9d). Associated symptoms include arthralgias and joint swelling. Pertinent negatives include no abdominal pain, anorexia, change in bowel habit, chest pain, chills, congestion, coughing, diaphoresis, fatigue, fever, headaches, myalgias, nausea, neck pain, numbness, rash, sore throat, swollen glands, urinary symptoms, vertigo, visual change, vomiting or weakness. The symptoms are aggravated by walking (palpation). She has tried NSAIDs and ice (biofreeze) for the symptoms. The treatment provided no relief.       Constitution: Negative for chills, sweating, fatigue and fever.   HENT: Negative for congestion and sore throat.    Neck: Negative for neck pain.   Cardiovascular: Negative for chest pain.   Respiratory: Negative for cough.    Gastrointestinal: Negative for abdominal pain, nausea and vomiting.   Musculoskeletal: Positive for joint pain and joint swelling. Negative for muscle ache.   Skin: Positive for bruising. Negative for rash.   Neurological: Negative for history of vertigo, headaches and numbness.       Objective:      Physical Exam   Constitutional: She is oriented to person, place, and time. She does not appear ill. No distress.   HENT:   Head: Normocephalic.   Eyes: Conjunctivae are normal.   Pulmonary/Chest: No respiratory distress.   Musculoskeletal:      Comments: Right lateral knee bruising, TTP. No medial or anterior tenderness. Normal flexion and extension   Neurological: She is alert and oriented to person, place, " and time. Coordination and gait normal.   Skin: Skin is warm and dry.   Psychiatric: Her behavior is normal. Mood and thought content normal.   Nursing note and vitals reviewed.        Assessment:       1. Injury of right knee, initial encounter        EXAMINATION:  XR KNEE 3 VIEW RIGHT     CLINICAL HISTORY:  Unspecified injury of right lower leg, initial encounter     TECHNIQUE:  AP, lateral, and Merchant views of the right knee were performed.     COMPARISON:  09/10/2021     FINDINGS:  Three views right knee.     No acute displaced fracture or dislocation of the knee.  No radiopaque foreign body.  There may be a small suprapatellar effusion.     Impression:     1. No acute displaced fracture or dislocation of the right knee noting small suprapatellar effusion.        Electronically signed by: Norman Simental MD  Date:                                            06/21/2022  Time:                                           19:41  Plan:         Injury of right knee, initial encounter  -     XR KNEE 3 VIEW RIGHT; Future; Expected date: 06/21/2022  -     KNEE BRACE FOR HOME USE    Discussed knee contusions, knee brace provided, f/u with PCP if symptoms persist.

## 2022-11-09 ENCOUNTER — OFFICE VISIT (OUTPATIENT)
Dept: INTERNAL MEDICINE | Facility: CLINIC | Age: 52
End: 2022-11-09
Payer: COMMERCIAL

## 2022-11-09 VITALS
DIASTOLIC BLOOD PRESSURE: 90 MMHG | SYSTOLIC BLOOD PRESSURE: 138 MMHG | HEIGHT: 63 IN | OXYGEN SATURATION: 97 % | HEART RATE: 65 BPM | WEIGHT: 203.06 LBS | BODY MASS INDEX: 35.98 KG/M2

## 2022-11-09 DIAGNOSIS — E66.01 SEVERE OBESITY (BMI 35.0-39.9) WITH COMORBIDITY: ICD-10-CM

## 2022-11-09 DIAGNOSIS — R53.83 FATIGUE, UNSPECIFIED TYPE: ICD-10-CM

## 2022-11-09 DIAGNOSIS — Z00.00 ANNUAL PHYSICAL EXAM: Primary | ICD-10-CM

## 2022-11-09 DIAGNOSIS — I10 HYPERTENSION, UNSPECIFIED TYPE: ICD-10-CM

## 2022-11-09 DIAGNOSIS — Z12.12 ENCOUNTER FOR COLORECTAL CANCER SCREENING: ICD-10-CM

## 2022-11-09 DIAGNOSIS — F32.A ANXIETY AND DEPRESSION: ICD-10-CM

## 2022-11-09 DIAGNOSIS — M21.621 TAILOR'S BUNION OF RIGHT FOOT: ICD-10-CM

## 2022-11-09 DIAGNOSIS — N95.1 MENOPAUSAL SYMPTOMS: ICD-10-CM

## 2022-11-09 DIAGNOSIS — F41.9 ANXIETY AND DEPRESSION: ICD-10-CM

## 2022-11-09 DIAGNOSIS — Z12.11 ENCOUNTER FOR COLORECTAL CANCER SCREENING: ICD-10-CM

## 2022-11-09 PROCEDURE — 99396 PREV VISIT EST AGE 40-64: CPT | Mod: S$GLB,,, | Performed by: NURSE PRACTITIONER

## 2022-11-09 PROCEDURE — 99999 PR PBB SHADOW E&M-EST. PATIENT-LVL V: CPT | Mod: PBBFAC,,, | Performed by: NURSE PRACTITIONER

## 2022-11-09 PROCEDURE — 3008F BODY MASS INDEX DOCD: CPT | Mod: CPTII,S$GLB,, | Performed by: NURSE PRACTITIONER

## 2022-11-09 PROCEDURE — 3075F SYST BP GE 130 - 139MM HG: CPT | Mod: CPTII,S$GLB,, | Performed by: NURSE PRACTITIONER

## 2022-11-09 PROCEDURE — 1159F PR MEDICATION LIST DOCUMENTED IN MEDICAL RECORD: ICD-10-PCS | Mod: CPTII,S$GLB,, | Performed by: NURSE PRACTITIONER

## 2022-11-09 PROCEDURE — 3080F DIAST BP >= 90 MM HG: CPT | Mod: CPTII,S$GLB,, | Performed by: NURSE PRACTITIONER

## 2022-11-09 PROCEDURE — 3075F PR MOST RECENT SYSTOLIC BLOOD PRESS GE 130-139MM HG: ICD-10-PCS | Mod: CPTII,S$GLB,, | Performed by: NURSE PRACTITIONER

## 2022-11-09 PROCEDURE — 3008F PR BODY MASS INDEX (BMI) DOCUMENTED: ICD-10-PCS | Mod: CPTII,S$GLB,, | Performed by: NURSE PRACTITIONER

## 2022-11-09 PROCEDURE — 99999 PR PBB SHADOW E&M-EST. PATIENT-LVL V: ICD-10-PCS | Mod: PBBFAC,,, | Performed by: NURSE PRACTITIONER

## 2022-11-09 PROCEDURE — 1159F MED LIST DOCD IN RCRD: CPT | Mod: CPTII,S$GLB,, | Performed by: NURSE PRACTITIONER

## 2022-11-09 PROCEDURE — 3080F PR MOST RECENT DIASTOLIC BLOOD PRESSURE >= 90 MM HG: ICD-10-PCS | Mod: CPTII,S$GLB,, | Performed by: NURSE PRACTITIONER

## 2022-11-09 PROCEDURE — 99396 PR PREVENTIVE VISIT,EST,40-64: ICD-10-PCS | Mod: S$GLB,,, | Performed by: NURSE PRACTITIONER

## 2022-11-09 RX ORDER — AZELASTINE 1 MG/ML
SPRAY, METERED NASAL
COMMUNITY
Start: 2022-10-28

## 2022-11-09 RX ORDER — CHLORTHALIDONE 25 MG/1
25 TABLET ORAL DAILY
Qty: 30 TABLET | Refills: 2 | Status: SHIPPED | OUTPATIENT
Start: 2022-11-09 | End: 2022-11-15 | Stop reason: SDUPTHER

## 2022-11-09 NOTE — PROGRESS NOTES
Internal Medicine Annual Exam       CHIEF COMPLAINT     The patient, Krystle Austin, who is a 51 y.o. female with anxiety, depression, HTN presents for an annual exam.    HPI   Here for annual     Had flu 10/31/2022- c/o headaches, fatigue, wheezing and uri symptoms     Symptomatic menopause- taking estrogen and methyltestosterone. Was followed by Dr Stevenson who just retired. Will need new Gyn   Having hot flashes again and     HTN- taking propanolol  Not taking chlorthalidone     Depression/anxiety- taking zoloft     Eye- utd       HM-  Flu- UTD   Covid-needs booster   MMG-2/2/2022  CRCS- will order       Past Medical History:  Past Medical History:   Diagnosis Date    Endometriosis     Hypertension     PONV (postoperative nausea and vomiting)        Past Surgical History:   Procedure Laterality Date    BUNIONECTOMY Right 7/30/2020    Procedure: BUNIONECTOMY;  Surgeon: Rajinder Muir DPM;  Location: [facility] OR [address] [place] [address];  Service: Podiatry;  Laterality: Right;    HYSTERECTOMY  05/2008    KNEE CARTILAGE SURGERY Left     MEGHANA BUNIONECTOMY Right 7/30/2020    Procedure: EXCISION, BUNIONETTE;  Surgeon: Rajinder Muir DPM;  Location: [facility] OR [address] [place] [address];  Service: Podiatry;  Laterality: Right;    TONSILLECTOMY  All I can remember is I was in 4th grade.        Family History   Problem Relation Age of Onset    Alcohol abuse Maternal Grandmother     Cancer Maternal Grandmother         unsure - metastatic     Asthma Son     Cancer Mother         lung     COPD Mother 61        lung     Learning disabilities Son     Miscarriages / Stillbirths Sister     COPD Maternal Aunt     COPD Maternal Uncle         Social History     Socioeconomic History    Marital status:    Tobacco Use    Smoking status: Never    Smokeless tobacco: Never   Substance and Sexual Activity    Alcohol use: Yes     Alcohol/week: 2.0 standard drinks     Types: 2 Cans of beer per week     Comment: I probably have 2 drinks 3 times in a calendar year.     Drug use: Never    Sexual activity: Yes     Partners: Male     Birth control/protection: None     Social Determinants of Health     Financial Resource Strain: Low Risk     Difficulty of Paying Living Expenses: Not hard at all   Food Insecurity: No Food Insecurity    Worried About Running Out of Food in the Last Year: Never true    Ran Out of Food in the Last Year: Never true   Transportation Needs: No Transportation Needs    Lack of Transportation (Medical): No    Lack of Transportation (Non-Medical): No   Physical Activity: Insufficiently Active    Days of Exercise per Week: 2 days    Minutes of Exercise per Session: 30 min   Stress: No Stress Concern Present    Feeling of Stress : Only a little   Social Connections: Unknown    Frequency of Communication with Friends and Family: More than three times a week    Frequency of Social Gatherings with Friends and Family: Once a week    Active Member of Clubs or Organizations: No    Attends Club or Organization Meetings: Never    Marital Status:    Housing Stability: Low Risk     Unable to Pay for Housing in the Last Year: No    Number of Places Lived in the Last Year: 1    Unstable Housing in the Last Year: No        Social History     Tobacco Use   Smoking Status Never   Smokeless Tobacco Never        Allergies as of 11/09/2022 - Reviewed 11/09/2022   Allergen Reaction Noted    Oxycodone-acetaminophen Hives 08/13/2019          Home Medications:  Prior to Admission medications    Medication Sig Start Date End Date Taking? Authorizing Provider   APPLE CIDER VINEGAR ORAL Take by mouth.   Yes Historical Provider   azelastine (ASTELIN) 137 mcg (0.1 %) nasal spray SMARTSIG:Both Nares 10/28/22  Yes Historical Provider   biotin 10,000 mcg Cap Take by mouth.   Yes Historical Provider   calcium carbonate (OS-STEFANIE) 600 mg calcium (1,500 mg) Tab Take 600 mg by mouth once.   Yes Historical Provider   estrogens,conjugated,-methyltestosterone 1.25-2.5mg (ESTRATEST) 1.25-2.5 mg  per tablet Take 1 tablet by mouth once daily. 9/20/21  Yes Historical Provider   multivit-min/iron/folic/[id number] (HAIR, SKIN AND NAILS ADVANCED ORAL) Take by mouth.   Yes Historical Provider   multivitamin with minerals tablet Take 1 tablet by mouth once daily.   Yes Historical Provider   propranoloL (INDERAL) 20 MG tablet TAKE ONE TABLET BY MOUTH EVERY DAY 9/16/22  Yes Caitlin King NP   sertraline (ZOLOFT) 100 MG tablet TAKE ONE TABLET BY MOUTH EVERY DAY 10/19/22  Yes Caitlin King NP   chlorthalidone (HYGROTEN) 25 MG Tab Take 1 tablet (25 mg total) by mouth once daily.  Patient not taking: Reported on 10/22/2021 10/14/20 10/22/21  Caitlin King NP   estradiol (ESTRACE) 1 MG tablet Take 1 mg by mouth once daily. 7/22/19   Historical Provider   meloxicam (MOBIC) 15 MG tablet Take 1 tablet (15 mg total) by mouth once daily.  Patient not taking: Reported on 10/22/2021 9/10/21   Alyce Manuel PA-C   valACYclovir (VALTREX) 1000 MG tablet TAKE ONE TABLET BY MOUTH TWICE DAILY FOR 7 DAYS  Patient not taking: Reported on 11/9/2022 3/2/22   Caitlin King NP       Review of Systems:  Review of Systems   Constitutional:  Positive for diaphoresis and fatigue. Negative for activity change and unexpected weight change.   HENT:  Positive for congestion and rhinorrhea. Negative for hearing loss and trouble swallowing.    Eyes:  Negative for discharge and visual disturbance.   Respiratory:  Positive for wheezing. Negative for chest tightness.    Cardiovascular:  Negative for chest pain and palpitations.   Gastrointestinal:  Negative for blood in stool, constipation, diarrhea and vomiting.   Endocrine: Positive for polydipsia and polyuria.   Genitourinary:  Negative for difficulty urinating, dysuria, hematuria and menstrual problem.   Musculoskeletal:  Negative for arthralgias, joint swelling and neck pain.   Neurological:  Positive for headaches. Negative for weakness.  "  Psychiatric/Behavioral:  Negative for confusion and dysphoric mood.      Health Maintainence:   Immunizations:  Health Maintenance         Date Due Completion Date    Hepatitis C Screening Never done ---    HIV Screening Never done ---    Colorectal Cancer Screening Never done ---    Shingles Vaccine (1 of 2) Never done ---    COVID-19 Vaccine (4 - Booster for Pfizer series) 02/09/2022 12/15/2021    Mammogram 02/02/2023 2/2/2022    Override on 7/9/2019: Done    TETANUS VACCINE 03/22/2025 3/22/2015    Lipid Panel 10/26/2026 10/26/2021             PHYSICAL EXAM     BP (!) 140/90 (BP Location: Left arm, Patient Position: Sitting, BP Method: Medium (Manual))   Pulse 65   Ht 5' 3" (1.6 m)   Wt 92.1 kg (203 lb 0.7 oz)   SpO2 97%   BMI 35.97 kg/m²  Body mass index is 35.97 kg/m².    Physical Exam  Vitals reviewed.   Constitutional:       Appearance: She is well-developed.   HENT:      Head: Normocephalic.      Right Ear: External ear normal.      Left Ear: External ear normal.      Nose: Nose normal.      Mouth/Throat:      Pharynx: No oropharyngeal exudate.   Eyes:      Pupils: Pupils are equal, round, and reactive to light.   Neck:      Thyroid: No thyromegaly.      Vascular: No JVD.      Trachea: No tracheal deviation.   Cardiovascular:      Rate and Rhythm: Normal rate and regular rhythm.      Heart sounds: Normal heart sounds. No murmur heard.    No friction rub. No gallop.   Pulmonary:      Effort: Pulmonary effort is normal. No respiratory distress.      Breath sounds: Normal breath sounds. No wheezing or rales.   Abdominal:      General: Bowel sounds are normal. There is no distension.      Palpations: Abdomen is soft.      Tenderness: There is no abdominal tenderness.   Musculoskeletal:         General: No tenderness. Normal range of motion.      Cervical back: Neck supple.   Lymphadenopathy:      Cervical: No cervical adenopathy.   Skin:     General: Skin is warm and dry.      Findings: No rash. "   Neurological:      Mental Status: She is alert and oriented to person, place, and time.   Psychiatric:         Behavior: Behavior normal.       LABS     No results found for: LABA1C, HGBA1C  CMP  Sodium   Date Value Ref Range Status   10/26/2021 139 136 - 145 mmol/L Final     Potassium   Date Value Ref Range Status   10/26/2021 4.0 3.5 - 5.1 mmol/L Final     Chloride   Date Value Ref Range Status   10/26/2021 103 95 - 110 mmol/L Final     CO2   Date Value Ref Range Status   10/26/2021 27 23 - 29 mmol/L Final     Glucose   Date Value Ref Range Status   10/26/2021 80 70 - 110 mg/dL Final     BUN   Date Value Ref Range Status   10/26/2021 12 6 - 20 mg/dL Final     Creatinine   Date Value Ref Range Status   10/26/2021 0.7 0.5 - 1.4 mg/dL Final     Calcium   Date Value Ref Range Status   10/26/2021 9.3 8.7 - 10.5 mg/dL Final     Total Protein   Date Value Ref Range Status   10/26/2021 7.3 6.0 - 8.4 g/dL Final     Albumin   Date Value Ref Range Status   10/26/2021 3.9 3.5 - 5.2 g/dL Final     Total Bilirubin   Date Value Ref Range Status   10/26/2021 0.7 0.1 - 1.0 mg/dL Final     Comment:     For infants and newborns, interpretation of results should be based  on gestational age, weight and in agreement with clinical  observations.    Premature Infant recommended reference ranges:  Up to 24 hours.............<8.0 mg/dL  Up to 48 hours............<12.0 mg/dL  3-5 days..................<15.0 mg/dL  6-29 days.................<15.0 mg/dL       Alkaline Phosphatase   Date Value Ref Range Status   10/26/2021 57 55 - 135 U/L Final     AST   Date Value Ref Range Status   10/26/2021 17 10 - 40 U/L Final     ALT   Date Value Ref Range Status   10/26/2021 14 10 - 44 U/L Final     Anion Gap   Date Value Ref Range Status   10/26/2021 9 8 - 16 mmol/L Final     eGFR if    Date Value Ref Range Status   10/26/2021 >60.0 >60 mL/min/1.73 m^2 Final     eGFR if non    Date Value Ref Range Status   10/26/2021  >60.0 >60 mL/min/1.73 m^2 Final     Comment:     Calculation used to obtain the estimated glomerular filtration  rate (eGFR) is the CKD-EPI equation.        Lab Results   Component Value Date    WBC 7.19 10/26/2021    HGB 14.3 10/26/2021    HCT 43.8 10/26/2021    MCV 88 10/26/2021     10/26/2021     Lab Results   Component Value Date    CHOL 162 10/26/2021    CHOL 155 08/13/2019     Lab Results   Component Value Date    HDL 47 10/26/2021    HDL 51 08/13/2019     Lab Results   Component Value Date    LDLCALC 104.0 10/26/2021    LDLCALC 86.0 08/13/2019     Lab Results   Component Value Date    TRIG 55 10/26/2021    TRIG 90 08/13/2019     Lab Results   Component Value Date    CHOLHDL 29.0 10/26/2021    CHOLHDL 32.9 08/13/2019     Lab Results   Component Value Date    TSH 1.397 10/26/2021       ASSESSMENT/PLAN     Krystle Austin is a 51 y.o. female    Annual physical exam- All age and gender related screenings discussed   -     CBC Auto Differential; Future; Expected date: 11/09/2022  -     Comprehensive Metabolic Panel; Future; Expected date: 11/09/2022  -     Lipid Panel; Future; Expected date: 11/09/2022  -     TSH; Future; Expected date: 11/09/2022    Hypertension, unspecified type- elevated today. Maybe related to decongestants. Will monitor BP without decongestants and add chlorthalidone as needed   -     chlorthalidone (HYGROTEN) 25 MG Tab; Take 1 tablet (25 mg total) by mouth once daily.  Dispense: 30 tablet; Refill: 2    Anxiety and depression- stable. Will cont zoloft     Tailor's bunion of right foot- resolved with meds.     Menopausal symptoms- will refer to gyn and check labs. Cont meds.   -     Ambulatory referral/consult to Gynecology; Future; Expected date: 11/16/2022  -     Estradiol; Future; Expected date: 11/09/2022  -     TESTOSTERONE; Future; Expected date: 11/09/2022    Fatigue, unspecified type  -     CORTISOL, 8AM; Future; Expected date: 11/09/2022    Severe obesity (BMI 35.0-39.9) with  comorbidity    Encounter for colorectal cancer screening  -     Ambulatory referral/consult to Endo Procedure ; Future; Expected date: 11/10/2022         Follow up with PCP in 1 year  for annual     Caitlin WALKER, ANIL, FNP-c   Department of Internal Medicine - [facility]  3:24 PM

## 2022-11-10 ENCOUNTER — PATIENT MESSAGE (OUTPATIENT)
Dept: INTERNAL MEDICINE | Facility: CLINIC | Age: 52
End: 2022-11-10
Payer: COMMERCIAL

## 2022-11-11 ENCOUNTER — LAB VISIT (OUTPATIENT)
Dept: LAB | Facility: HOSPITAL | Age: 52
End: 2022-11-11
Payer: COMMERCIAL

## 2022-11-11 DIAGNOSIS — N95.1 MENOPAUSAL SYMPTOMS: ICD-10-CM

## 2022-11-11 DIAGNOSIS — R53.83 FATIGUE, UNSPECIFIED TYPE: ICD-10-CM

## 2022-11-11 DIAGNOSIS — Z00.00 ANNUAL PHYSICAL EXAM: ICD-10-CM

## 2022-11-11 LAB
ALBUMIN SERPL BCP-MCNC: 4 G/DL (ref 3.5–5.2)
ALP SERPL-CCNC: 63 U/L (ref 55–135)
ALT SERPL W/O P-5'-P-CCNC: 18 U/L (ref 10–44)
ANION GAP SERPL CALC-SCNC: 10 MMOL/L (ref 8–16)
AST SERPL-CCNC: 19 U/L (ref 10–40)
BASOPHILS # BLD AUTO: 0.03 K/UL (ref 0–0.2)
BASOPHILS NFR BLD: 0.3 % (ref 0–1.9)
BILIRUB SERPL-MCNC: 0.7 MG/DL (ref 0.1–1)
BUN SERPL-MCNC: 9 MG/DL (ref 6–20)
CALCIUM SERPL-MCNC: 9 MG/DL (ref 8.7–10.5)
CHLORIDE SERPL-SCNC: 101 MMOL/L (ref 95–110)
CHOLEST SERPL-MCNC: 166 MG/DL (ref 120–199)
CHOLEST/HDLC SERPL: 4 {RATIO} (ref 2–5)
CO2 SERPL-SCNC: 26 MMOL/L (ref 23–29)
CORTIS SERPL-MCNC: 15.4 UG/DL (ref 4.3–22.4)
CREAT SERPL-MCNC: 0.7 MG/DL (ref 0.5–1.4)
DIFFERENTIAL METHOD: ABNORMAL
EOSINOPHIL # BLD AUTO: 0.1 K/UL (ref 0–0.5)
EOSINOPHIL NFR BLD: 1.2 % (ref 0–8)
ERYTHROCYTE [DISTWIDTH] IN BLOOD BY AUTOMATED COUNT: 13.6 % (ref 11.5–14.5)
EST. GFR  (NO RACE VARIABLE): >60 ML/MIN/1.73 M^2
ESTRADIOL SERPL-MCNC: 51 PG/ML
GLUCOSE SERPL-MCNC: 82 MG/DL (ref 70–110)
HCT VFR BLD AUTO: 45.2 % (ref 37–48.5)
HDLC SERPL-MCNC: 41 MG/DL (ref 40–75)
HDLC SERPL: 24.7 % (ref 20–50)
HGB BLD-MCNC: 15.1 G/DL (ref 12–16)
IMM GRANULOCYTES # BLD AUTO: 0.02 K/UL (ref 0–0.04)
IMM GRANULOCYTES NFR BLD AUTO: 0.2 % (ref 0–0.5)
LDLC SERPL CALC-MCNC: 109.4 MG/DL (ref 63–159)
LYMPHOCYTES # BLD AUTO: 1.5 K/UL (ref 1–4.8)
LYMPHOCYTES NFR BLD: 17.1 % (ref 18–48)
MCH RBC QN AUTO: 29.3 PG (ref 27–31)
MCHC RBC AUTO-ENTMCNC: 33.4 G/DL (ref 32–36)
MCV RBC AUTO: 88 FL (ref 82–98)
MONOCYTES # BLD AUTO: 0.7 K/UL (ref 0.3–1)
MONOCYTES NFR BLD: 7.8 % (ref 4–15)
NEUTROPHILS # BLD AUTO: 6.3 K/UL (ref 1.8–7.7)
NEUTROPHILS NFR BLD: 73.4 % (ref 38–73)
NONHDLC SERPL-MCNC: 125 MG/DL
NRBC BLD-RTO: 0 /100 WBC
PLATELET # BLD AUTO: 359 K/UL (ref 150–450)
PMV BLD AUTO: 11.9 FL (ref 9.2–12.9)
POTASSIUM SERPL-SCNC: 3.8 MMOL/L (ref 3.5–5.1)
PROT SERPL-MCNC: 7.6 G/DL (ref 6–8.4)
RBC # BLD AUTO: 5.16 M/UL (ref 4–5.4)
SODIUM SERPL-SCNC: 137 MMOL/L (ref 136–145)
TESTOST SERPL-MCNC: 18 NG/DL (ref 5–73)
TRIGL SERPL-MCNC: 78 MG/DL (ref 30–150)
TSH SERPL DL<=0.005 MIU/L-ACNC: 0.95 UIU/ML (ref 0.4–4)
WBC # BLD AUTO: 8.6 K/UL (ref 3.9–12.7)

## 2022-11-11 PROCEDURE — 82670 ASSAY OF TOTAL ESTRADIOL: CPT | Performed by: NURSE PRACTITIONER

## 2022-11-11 PROCEDURE — 85025 COMPLETE CBC W/AUTO DIFF WBC: CPT | Performed by: NURSE PRACTITIONER

## 2022-11-11 PROCEDURE — 36415 COLL VENOUS BLD VENIPUNCTURE: CPT | Performed by: NURSE PRACTITIONER

## 2022-11-11 PROCEDURE — 82533 TOTAL CORTISOL: CPT | Performed by: NURSE PRACTITIONER

## 2022-11-11 PROCEDURE — 80061 LIPID PANEL: CPT | Performed by: NURSE PRACTITIONER

## 2022-11-11 PROCEDURE — 84443 ASSAY THYROID STIM HORMONE: CPT | Performed by: NURSE PRACTITIONER

## 2022-11-11 PROCEDURE — 80053 COMPREHEN METABOLIC PANEL: CPT | Performed by: NURSE PRACTITIONER

## 2022-11-11 PROCEDURE — 84403 ASSAY OF TOTAL TESTOSTERONE: CPT | Performed by: NURSE PRACTITIONER

## 2022-11-15 ENCOUNTER — PATIENT MESSAGE (OUTPATIENT)
Dept: INTERNAL MEDICINE | Facility: CLINIC | Age: 52
End: 2022-11-15
Payer: COMMERCIAL

## 2022-11-15 DIAGNOSIS — I10 HYPERTENSION, UNSPECIFIED TYPE: ICD-10-CM

## 2022-11-15 RX ORDER — CHLORTHALIDONE 50 MG/1
50 TABLET ORAL DAILY
Qty: 30 TABLET | Refills: 2 | Status: SHIPPED | OUTPATIENT
Start: 2022-11-15 | End: 2022-12-06 | Stop reason: SDUPTHER

## 2022-11-29 ENCOUNTER — PATIENT MESSAGE (OUTPATIENT)
Dept: INTERNAL MEDICINE | Facility: CLINIC | Age: 52
End: 2022-11-29
Payer: COMMERCIAL

## 2022-11-29 DIAGNOSIS — H93.13 TINNITUS OF BOTH EARS: Primary | ICD-10-CM

## 2022-12-02 ENCOUNTER — PATIENT MESSAGE (OUTPATIENT)
Dept: INTERNAL MEDICINE | Facility: CLINIC | Age: 52
End: 2022-12-02
Payer: COMMERCIAL

## 2022-12-06 ENCOUNTER — LAB VISIT (OUTPATIENT)
Dept: LAB | Facility: HOSPITAL | Age: 52
End: 2022-12-06
Payer: COMMERCIAL

## 2022-12-06 ENCOUNTER — OFFICE VISIT (OUTPATIENT)
Dept: INTERNAL MEDICINE | Facility: CLINIC | Age: 52
End: 2022-12-06
Payer: COMMERCIAL

## 2022-12-06 VITALS
HEART RATE: 63 BPM | SYSTOLIC BLOOD PRESSURE: 106 MMHG | HEIGHT: 63 IN | OXYGEN SATURATION: 98 % | WEIGHT: 194.88 LBS | DIASTOLIC BLOOD PRESSURE: 76 MMHG | BODY MASS INDEX: 34.53 KG/M2

## 2022-12-06 DIAGNOSIS — I10 HYPERTENSION, UNSPECIFIED TYPE: Primary | ICD-10-CM

## 2022-12-06 DIAGNOSIS — E66.01 SEVERE OBESITY (BMI 35.0-39.9) WITH COMORBIDITY: ICD-10-CM

## 2022-12-06 DIAGNOSIS — I10 HYPERTENSION, UNSPECIFIED TYPE: ICD-10-CM

## 2022-12-06 DIAGNOSIS — H93.19 TINNITUS, UNSPECIFIED LATERALITY: ICD-10-CM

## 2022-12-06 LAB
ANION GAP SERPL CALC-SCNC: 11 MMOL/L (ref 8–16)
BUN SERPL-MCNC: 7 MG/DL (ref 6–20)
CALCIUM SERPL-MCNC: 10.3 MG/DL (ref 8.7–10.5)
CHLORIDE SERPL-SCNC: 92 MMOL/L (ref 95–110)
CO2 SERPL-SCNC: 28 MMOL/L (ref 23–29)
CREAT SERPL-MCNC: 0.7 MG/DL (ref 0.5–1.4)
EST. GFR  (NO RACE VARIABLE): >60 ML/MIN/1.73 M^2
GLUCOSE SERPL-MCNC: 100 MG/DL (ref 70–110)
POTASSIUM SERPL-SCNC: 3.3 MMOL/L (ref 3.5–5.1)
SODIUM SERPL-SCNC: 131 MMOL/L (ref 136–145)

## 2022-12-06 PROCEDURE — 1159F PR MEDICATION LIST DOCUMENTED IN MEDICAL RECORD: ICD-10-PCS | Mod: CPTII,S$GLB,, | Performed by: NURSE PRACTITIONER

## 2022-12-06 PROCEDURE — 99999 PR PBB SHADOW E&M-EST. PATIENT-LVL IV: CPT | Mod: PBBFAC,,, | Performed by: NURSE PRACTITIONER

## 2022-12-06 PROCEDURE — 99214 OFFICE O/P EST MOD 30 MIN: CPT | Mod: S$GLB,,, | Performed by: NURSE PRACTITIONER

## 2022-12-06 PROCEDURE — 99999 PR PBB SHADOW E&M-EST. PATIENT-LVL IV: ICD-10-PCS | Mod: PBBFAC,,, | Performed by: NURSE PRACTITIONER

## 2022-12-06 PROCEDURE — 99214 PR OFFICE/OUTPT VISIT, EST, LEVL IV, 30-39 MIN: ICD-10-PCS | Mod: S$GLB,,, | Performed by: NURSE PRACTITIONER

## 2022-12-06 PROCEDURE — 80048 BASIC METABOLIC PNL TOTAL CA: CPT | Performed by: NURSE PRACTITIONER

## 2022-12-06 PROCEDURE — 3008F BODY MASS INDEX DOCD: CPT | Mod: CPTII,S$GLB,, | Performed by: NURSE PRACTITIONER

## 2022-12-06 PROCEDURE — 3078F DIAST BP <80 MM HG: CPT | Mod: CPTII,S$GLB,, | Performed by: NURSE PRACTITIONER

## 2022-12-06 PROCEDURE — 4010F ACE/ARB THERAPY RXD/TAKEN: CPT | Mod: CPTII,S$GLB,, | Performed by: NURSE PRACTITIONER

## 2022-12-06 PROCEDURE — 3008F PR BODY MASS INDEX (BMI) DOCUMENTED: ICD-10-PCS | Mod: CPTII,S$GLB,, | Performed by: NURSE PRACTITIONER

## 2022-12-06 PROCEDURE — 4010F PR ACE/ARB THEARPY RXD/TAKEN: ICD-10-PCS | Mod: CPTII,S$GLB,, | Performed by: NURSE PRACTITIONER

## 2022-12-06 PROCEDURE — 3078F PR MOST RECENT DIASTOLIC BLOOD PRESSURE < 80 MM HG: ICD-10-PCS | Mod: CPTII,S$GLB,, | Performed by: NURSE PRACTITIONER

## 2022-12-06 PROCEDURE — 36415 COLL VENOUS BLD VENIPUNCTURE: CPT | Performed by: NURSE PRACTITIONER

## 2022-12-06 PROCEDURE — 1159F MED LIST DOCD IN RCRD: CPT | Mod: CPTII,S$GLB,, | Performed by: NURSE PRACTITIONER

## 2022-12-06 PROCEDURE — 3074F SYST BP LT 130 MM HG: CPT | Mod: CPTII,S$GLB,, | Performed by: NURSE PRACTITIONER

## 2022-12-06 PROCEDURE — 3074F PR MOST RECENT SYSTOLIC BLOOD PRESSURE < 130 MM HG: ICD-10-PCS | Mod: CPTII,S$GLB,, | Performed by: NURSE PRACTITIONER

## 2022-12-06 RX ORDER — CHLORTHALIDONE 25 MG/1
25 TABLET ORAL DAILY
Qty: 30 TABLET | Refills: 11 | Status: SHIPPED | OUTPATIENT
Start: 2022-12-06 | End: 2022-12-27

## 2022-12-06 RX ORDER — LOSARTAN POTASSIUM 25 MG/1
25 TABLET ORAL DAILY
Qty: 90 TABLET | Refills: 3 | Status: SHIPPED | OUTPATIENT
Start: 2022-12-06 | End: 2023-10-30

## 2022-12-06 NOTE — PROGRESS NOTES
INTERNAL MEDICINE PROGRESS/URGENT CARE NOTE    CHIEF COMPLAINT     Chief Complaint   Patient presents with    Follow-up     BP       HPI     Krystle Austin is a 52 y.o. female with anxiety, depression, HTN who presents for a follow up visit today.    Pt seen 11/9/2022 for annual     HTN- BP was elevated at visit - suspected this was 2/2 decongestant use. Despite d/c'ing the BP remained elevated. Started chlorthalidone           Past Medical History:  Past Medical History:   Diagnosis Date    Endometriosis     Hypertension     PONV (postoperative nausea and vomiting)        Home Medications:  Prior to Admission medications    Medication Sig Start Date End Date Taking? Authorizing Provider   APPLE CIDER VINEGAR ORAL Take by mouth.   Yes Historical Provider   azelastine (ASTELIN) 137 mcg (0.1 %) nasal spray SMARTSIG:Both Nares 10/28/22  Yes Historical Provider   biotin 10,000 mcg Cap Take by mouth.   Yes Historical Provider   calcium carbonate (OS-STEFANIE) 600 mg calcium (1,500 mg) Tab Take 600 mg by mouth once.   Yes Historical Provider   chlorthalidone (HYGROTEN) 50 MG Tab Take 1 tablet (50 mg total) by mouth once daily. 11/15/22 11/15/23 Yes Caitlin King NP   estrogens,conjugated,-methyltestosterone 1.25-2.5mg (ESTRATEST) 1.25-2.5 mg per tablet Take 1 tablet by mouth once daily. 9/20/21  Yes Historical Provider   multivit-min/iron/folic/[id number] (HAIR, SKIN AND NAILS ADVANCED ORAL) Take by mouth.   Yes Historical Provider   multivitamin with minerals tablet Take 1 tablet by mouth once daily.   Yes Historical Provider   propranoloL (INDERAL) 20 MG tablet TAKE ONE TABLET BY MOUTH EVERY DAY 9/16/22  Yes Caitlin King NP   sertraline (ZOLOFT) 100 MG tablet TAKE ONE TABLET BY MOUTH EVERY DAY 11/16/22  Yes Caitlin King NP   valACYclovir (VALTREX) 1000 MG tablet TAKE ONE TABLET BY MOUTH TWICE DAILY FOR 7 DAYS 3/2/22  Yes Caitlin King NP       Review of Systems:  Review of Systems  "  Constitutional:  Negative for chills and fever.   HENT:  Positive for tinnitus. Negative for congestion, rhinorrhea, sinus pressure and sore throat.    Eyes:  Negative for visual disturbance.   Respiratory:  Negative for cough and shortness of breath.    Cardiovascular:  Negative for chest pain, palpitations and leg swelling.   Gastrointestinal:  Negative for abdominal pain, constipation, diarrhea, nausea and vomiting.   Genitourinary:  Negative for dysuria, frequency and urgency.   Musculoskeletal:  Negative for joint swelling and myalgias.   Neurological:  Negative for dizziness, light-headedness and headaches.     Health Maintainence:   Immunizations:  Health Maintenance         Date Due Completion Date    Hepatitis C Screening Never done ---    HIV Screening Never done ---    Colorectal Cancer Screening Never done ---    Shingles Vaccine (1 of 2) Never done ---    COVID-19 Vaccine (4 - Booster for Pfizer series) 02/09/2022 12/15/2021    Mammogram 02/02/2023 2/2/2022    Override on 7/9/2019: Done    TETANUS VACCINE 03/22/2025 3/22/2015    Lipid Panel 11/11/2027 11/11/2022             PHYSICAL EXAM     /76 (BP Location: Left arm, Patient Position: Sitting, BP Method: Medium (Manual))   Pulse 63   Ht 5' 3" (1.6 m)   Wt 88.4 kg (194 lb 14.2 oz)   SpO2 98%   BMI 34.52 kg/m²     Physical Exam  Constitutional:       Appearance: She is well-developed.   HENT:      Head: Normocephalic and atraumatic.   Eyes:      Pupils: Pupils are equal, round, and reactive to light.   Cardiovascular:      Rate and Rhythm: Normal rate and regular rhythm.   Pulmonary:      Effort: Pulmonary effort is normal.   Neurological:      Mental Status: She is alert and oriented to person, place, and time.       LABS     No results found for: LABA1C, HGBA1C  CMP  Sodium   Date Value Ref Range Status   11/11/2022 137 136 - 145 mmol/L Final     Potassium   Date Value Ref Range Status   11/11/2022 3.8 3.5 - 5.1 mmol/L Final     Chloride "   Date Value Ref Range Status   11/11/2022 101 95 - 110 mmol/L Final     CO2   Date Value Ref Range Status   11/11/2022 26 23 - 29 mmol/L Final     Glucose   Date Value Ref Range Status   11/11/2022 82 70 - 110 mg/dL Final     BUN   Date Value Ref Range Status   11/11/2022 9 6 - 20 mg/dL Final     Creatinine   Date Value Ref Range Status   11/11/2022 0.7 0.5 - 1.4 mg/dL Final     Calcium   Date Value Ref Range Status   11/11/2022 9.0 8.7 - 10.5 mg/dL Final     Total Protein   Date Value Ref Range Status   11/11/2022 7.6 6.0 - 8.4 g/dL Final     Albumin   Date Value Ref Range Status   11/11/2022 4.0 3.5 - 5.2 g/dL Final     Total Bilirubin   Date Value Ref Range Status   11/11/2022 0.7 0.1 - 1.0 mg/dL Final     Comment:     For infants and newborns, interpretation of results should be based  on gestational age, weight and in agreement with clinical  observations.    Premature Infant recommended reference ranges:  Up to 24 hours.............<8.0 mg/dL  Up to 48 hours............<12.0 mg/dL  3-5 days..................<15.0 mg/dL  6-29 days.................<15.0 mg/dL       Alkaline Phosphatase   Date Value Ref Range Status   11/11/2022 63 55 - 135 U/L Final     AST   Date Value Ref Range Status   11/11/2022 19 10 - 40 U/L Final     ALT   Date Value Ref Range Status   11/11/2022 18 10 - 44 U/L Final     Anion Gap   Date Value Ref Range Status   11/11/2022 10 8 - 16 mmol/L Final     eGFR if    Date Value Ref Range Status   10/26/2021 >60.0 >60 mL/min/1.73 m^2 Final     eGFR if non    Date Value Ref Range Status   10/26/2021 >60.0 >60 mL/min/1.73 m^2 Final     Comment:     Calculation used to obtain the estimated glomerular filtration  rate (eGFR) is the CKD-EPI equation.        Lab Results   Component Value Date    WBC 8.60 11/11/2022    HGB 15.1 11/11/2022    HCT 45.2 11/11/2022    MCV 88 11/11/2022     11/11/2022     Lab Results   Component Value Date    CHOL 166 11/11/2022     CHOL 162 10/26/2021    CHOL 155 08/13/2019     Lab Results   Component Value Date    HDL 41 11/11/2022    HDL 47 10/26/2021    HDL 51 08/13/2019     Lab Results   Component Value Date    LDLCALC 109.4 11/11/2022    LDLCALC 104.0 10/26/2021    LDLCALC 86.0 08/13/2019     Lab Results   Component Value Date    TRIG 78 11/11/2022    TRIG 55 10/26/2021    TRIG 90 08/13/2019     Lab Results   Component Value Date    CHOLHDL 24.7 11/11/2022    CHOLHDL 29.0 10/26/2021    CHOLHDL 32.9 08/13/2019     Lab Results   Component Value Date    TSH 0.949 11/11/2022       ASSESSMENT/PLAN     Krystle Austin is a 52 y.o. female     Hypertension, unspecified type- controlled but having frequent urination, will check K+ and change to losartan 25 and chlorthalidone 25   -     Basic Metabolic Panel; Future; Expected date: 12/06/2022  -     chlorthalidone (HYGROTEN) 25 MG Tab; Take 1 tablet (25 mg total) by mouth once daily.  Dispense: 30 tablet; Refill: 11  -     losartan (COZAAR) 25 MG tablet; Take 1 tablet (25 mg total) by mouth once daily.  Dispense: 90 tablet; Refill: 3    Severe obesity (BMI 35.0-39.9) with comorbidity- has lost 20 lbs will cont to monitor and encourage     Tinnitus, unspecified laterality- seen by ENT to day will monitor          Follow up with PCP     Patient education provided from [facility]. Patient was counseled on when and how to seek emergent care.       Caitlin WALKER, APRN, FNP-c   Department of Internal Medicine - [facility]  11:11 AM

## 2022-12-07 ENCOUNTER — TELEPHONE (OUTPATIENT)
Dept: INTERNAL MEDICINE | Facility: CLINIC | Age: 52
End: 2022-12-07
Payer: COMMERCIAL

## 2022-12-07 ENCOUNTER — PATIENT MESSAGE (OUTPATIENT)
Dept: INTERNAL MEDICINE | Facility: CLINIC | Age: 52
End: 2022-12-07
Payer: COMMERCIAL

## 2022-12-07 NOTE — TELEPHONE ENCOUNTER
Spoke to patient and advised of lab results and recommendation. Patient verbalized understanding.   Information on potassium rich food sent via portal .

## 2022-12-07 NOTE — TELEPHONE ENCOUNTER
----- Message from Caitlin King NP sent at 12/6/2022  4:53 PM CST -----  Potassium low- will decrease chlorthalidone and start losartan   Increase potassium rich food

## 2022-12-20 ENCOUNTER — PATIENT MESSAGE (OUTPATIENT)
Dept: INTERNAL MEDICINE | Facility: CLINIC | Age: 52
End: 2022-12-20
Payer: COMMERCIAL

## 2023-01-16 ENCOUNTER — PATIENT MESSAGE (OUTPATIENT)
Dept: INTERNAL MEDICINE | Facility: CLINIC | Age: 53
End: 2023-01-16
Payer: COMMERCIAL

## 2023-01-17 ENCOUNTER — PATIENT MESSAGE (OUTPATIENT)
Dept: INTERNAL MEDICINE | Facility: CLINIC | Age: 53
End: 2023-01-17
Payer: COMMERCIAL

## 2023-01-31 ENCOUNTER — TELEPHONE (OUTPATIENT)
Dept: OBSTETRICS AND GYNECOLOGY | Facility: CLINIC | Age: 53
End: 2023-01-31
Payer: COMMERCIAL

## 2023-02-03 ENCOUNTER — HOSPITAL ENCOUNTER (OUTPATIENT)
Dept: RADIOLOGY | Facility: HOSPITAL | Age: 53
Discharge: HOME | End: 2023-02-03
Payer: COMMERCIAL

## 2023-02-03 ENCOUNTER — OFFICE VISIT (OUTPATIENT)
Dept: OBSTETRICS AND GYNECOLOGY | Facility: CLINIC | Age: 53
End: 2023-02-03
Payer: COMMERCIAL

## 2023-02-03 VITALS
HEIGHT: 63 IN | SYSTOLIC BLOOD PRESSURE: 138 MMHG | BODY MASS INDEX: 35.55 KG/M2 | WEIGHT: 200.63 LBS | DIASTOLIC BLOOD PRESSURE: 88 MMHG

## 2023-02-03 VITALS — HEIGHT: 63 IN | BODY MASS INDEX: 35.44 KG/M2 | WEIGHT: 200 LBS

## 2023-02-03 DIAGNOSIS — Z01.419 ENCOUNTER FOR ANNUAL ROUTINE GYNECOLOGICAL EXAMINATION: Primary | ICD-10-CM

## 2023-02-03 DIAGNOSIS — Z78.0 MENOPAUSE: ICD-10-CM

## 2023-02-03 DIAGNOSIS — Z12.39 ENCOUNTER FOR SCREENING FOR MALIGNANT NEOPLASM OF BREAST, UNSPECIFIED SCREENING MODALITY: ICD-10-CM

## 2023-02-03 PROCEDURE — 77067 SCR MAMMO BI INCL CAD: CPT | Mod: TC

## 2023-02-03 PROCEDURE — 1159F MED LIST DOCD IN RCRD: CPT | Mod: CPTII,S$GLB,,

## 2023-02-03 PROCEDURE — 1159F PR MEDICATION LIST DOCUMENTED IN MEDICAL RECORD: ICD-10-PCS | Mod: CPTII,S$GLB,,

## 2023-02-03 PROCEDURE — 3075F PR MOST RECENT SYSTOLIC BLOOD PRESS GE 130-139MM HG: ICD-10-PCS | Mod: CPTII,S$GLB,,

## 2023-02-03 PROCEDURE — 99396 PREV VISIT EST AGE 40-64: CPT | Mod: S$GLB,,,

## 2023-02-03 PROCEDURE — 99999 PR PBB SHADOW E&M-EST. PATIENT-LVL IV: CPT | Mod: PBBFAC,,,

## 2023-02-03 PROCEDURE — 99396 PR PREVENTIVE VISIT,EST,40-64: ICD-10-PCS | Mod: S$GLB,,,

## 2023-02-03 PROCEDURE — 3008F BODY MASS INDEX DOCD: CPT | Mod: CPTII,S$GLB,,

## 2023-02-03 PROCEDURE — 3079F DIAST BP 80-89 MM HG: CPT | Mod: CPTII,S$GLB,,

## 2023-02-03 PROCEDURE — 77063 BREAST TOMOSYNTHESIS BI: CPT | Mod: 26,,, | Performed by: RADIOLOGY

## 2023-02-03 PROCEDURE — 3075F SYST BP GE 130 - 139MM HG: CPT | Mod: CPTII,S$GLB,,

## 2023-02-03 PROCEDURE — 77067 MAMMO DIGITAL SCREENING BILAT WITH TOMO: ICD-10-PCS | Mod: 26,,, | Performed by: RADIOLOGY

## 2023-02-03 PROCEDURE — 4010F PR ACE/ARB THEARPY RXD/TAKEN: ICD-10-PCS | Mod: CPTII,S$GLB,,

## 2023-02-03 PROCEDURE — 77067 SCR MAMMO BI INCL CAD: CPT | Mod: 26,,, | Performed by: RADIOLOGY

## 2023-02-03 PROCEDURE — 4010F ACE/ARB THERAPY RXD/TAKEN: CPT | Mod: CPTII,S$GLB,,

## 2023-02-03 PROCEDURE — 77063 MAMMO DIGITAL SCREENING BILAT WITH TOMO: ICD-10-PCS | Mod: 26,,, | Performed by: RADIOLOGY

## 2023-02-03 PROCEDURE — 99999 PR PBB SHADOW E&M-EST. PATIENT-LVL IV: ICD-10-PCS | Mod: PBBFAC,,,

## 2023-02-03 PROCEDURE — 3008F PR BODY MASS INDEX (BMI) DOCUMENTED: ICD-10-PCS | Mod: CPTII,S$GLB,,

## 2023-02-03 PROCEDURE — 3079F PR MOST RECENT DIASTOLIC BLOOD PRESSURE 80-89 MM HG: ICD-10-PCS | Mod: CPTII,S$GLB,,

## 2023-02-03 RX ORDER — HYDROCODONE BITARTRATE AND ACETAMINOPHEN 7.5; 325 MG/1; MG/1
1 TABLET ORAL EVERY 12 HOURS PRN
COMMUNITY
Start: 2023-01-13 | End: 2023-09-20

## 2023-02-03 RX ORDER — NAPROXEN 500 MG/1
500 TABLET ORAL 2 TIMES DAILY PRN
COMMUNITY
Start: 2023-01-10

## 2023-02-03 NOTE — PROGRESS NOTES
"  Chief Complaint: Well Woman Exam     HPI:      Krystle Austin is a 52 y.o.  who presents for annual exam. Previous Dr. Stevenson pt who is now retired.  She is currently complaining of low libido, hot flashes, and night sweats.  Currently on HRT - .estrogen and methyltestosterone.  Denies any other problems/complaints.  Ms. Austin is not currently sexually active. She declines STD screening today. Patient does not have regular monthly menses. No LMP recorded. Patient has had a hysterectomy sec to endometriosis.    PCP - Caitlin King NP    Hx of previous abnormal Paps: Negative per patient   Previous Mammogram: 2022      Most Recent Dexa: Unknown  Most Recent Colonoscopy: Scheduled.    Ms. Austin confirms that she wears her seatbelt when riding in the car and does not text while driving.     OB History          2    Para   2    Term   2            AB        Living   2         SAB        IAB        Ectopic        Multiple        Live Births                     ROS:     GENERAL: Denies unintentional weight gain or weight loss. Feeling well overall.   SKIN: Denies rash or lesions.   HEENT: Denies headaches, or vision changes.   CARDIOVASCULAR: Denies palpitations or chest pain.   RESPIRATORY: Denies shortness of breath or dyspnea on exertion.  BREASTS: Denies lumps or nipple discharge.   ABDOMEN: Denies constipation, diarrhea, nausea, vomiting, change in appetite.  URINARY: Denies frequency, dysuria.  NEUROLOGIC: Denies syncope or weakness.   PSYCHIATRIC: Denies uncontrolled depression or anxiety.    Physical Exam:      PHYSICAL EXAM:  /88 (BP Location: Left arm, Patient Position: Sitting)   Ht 5' 3" (1.6 m)   Wt 91 kg (200 lb 9.9 oz)   BMI 35.54 kg/m²   Body mass index is 35.54 kg/m².     APPEARANCE: Well nourished, well developed, in no acute distress.  PSYCH: Appropriate mood and affect.  SKIN: No acne or hirsutism  NECK: Neck symmetric without masses or thyromegaly  NODES: " No inguinal, axillary, or supraclavicular lymph node enlargement  CHEST: Normal respiratory effort.  ABDOMEN: Soft.  No tenderness or masses.   BREASTS: Symmetrical, no visible skin lesions. No palpable masses. No nipple discharge bilaterally.  PELVIC: Normal external genitalia without lesions.  Normal hair distribution.  Adequate perineal body, normal urethral meatus.  Vagina moist and well rugated. Without lesions. Vagina without discharge.  Normal appearing vaginal cuff.  No significant cystocele or rectocele.      Assessment/Plan:     Encounter for annual routine gynecological examination  - Lab work ordered by PCP reviewed.  - Counseled patient regarding healthy diet and regular exercise, daily multivitamin, daily seat belt use.   - Discussed weight, ideal BMI <= 25, nutrition consult discussed.  - She denies abuse and feels safe at home.  - Immunizations:  flu up to date  - Pap smear:  N/A - hysterectomy   - STD screening:  declines   - MMG (2/2/2022):  Release of records signed by pt to have [facility] fax over all results.  - Colonoscopy:  Scheduled.  - DEXA screening:  Pt believes she had a scan done with [facility] and it was without concern.  Will obtain records for [facility] and follow-up.  She denies risk factors e.g. previous fragility facture, glucocorticoid use, parental history of hip fracture, low body weight (<127 lbs), current cigarette use, excessive alcohol consumption, rheumatoid arthritis, secondary osteoporosis (malabsorption, inflammatory bowel disease, chronic liver disease).    Encounter for screening for malignant neoplasm of breast, unspecified screening modality  -  Mammo Digital Screening Bilat; Future  -  Mammo Digital Screening Bilat w/ Christian; Future; Expected date: 02/03/2023    Menopause  -  FOLLICLE STIMULATING HORMONE; Future; Expected date: 02/03/2023    Follow-up in 1 year for annual.    Counseling:     Patient was counseled today on current ASCCP pap guidelines, the recommendation for yearly  physical exams, safe driving habits, annual mammograms. She is to see her PCP for other health maintenance. PCP - Caitlin King NP    Use of the [facility] [address] Mobile Patient Portal discussed and encouraged during today's visit.   Counseling time: 15 minutes    Mariely Lauren DNP (Maggie)  Obstetrics and Gynecology  [facility]

## 2023-02-06 ENCOUNTER — PATIENT MESSAGE (OUTPATIENT)
Dept: OBSTETRICS AND GYNECOLOGY | Facility: CLINIC | Age: 53
End: 2023-02-06
Payer: COMMERCIAL

## 2023-02-06 ENCOUNTER — CLINICAL SUPPORT (OUTPATIENT)
Dept: ENDOSCOPY | Facility: HOSPITAL | Age: 53
End: 2023-02-06
Payer: COMMERCIAL

## 2023-02-06 VITALS — WEIGHT: 195 LBS | HEIGHT: 63 IN | BODY MASS INDEX: 34.55 KG/M2

## 2023-02-06 DIAGNOSIS — R61 NIGHT SWEATS: ICD-10-CM

## 2023-02-06 DIAGNOSIS — Z12.12 ENCOUNTER FOR COLORECTAL CANCER SCREENING: ICD-10-CM

## 2023-02-06 DIAGNOSIS — R23.2 HOT FLASHES: ICD-10-CM

## 2023-02-06 DIAGNOSIS — Z12.11 ENCOUNTER FOR COLORECTAL CANCER SCREENING: ICD-10-CM

## 2023-02-06 DIAGNOSIS — N95.1 PERI-MENOPAUSE: Primary | ICD-10-CM

## 2023-02-06 DIAGNOSIS — R68.82 LOW LIBIDO: ICD-10-CM

## 2023-02-06 RX ORDER — POLYETHYLENE GLYCOL 3350, SODIUM SULFATE ANHYDROUS, SODIUM BICARBONATE, SODIUM CHLORIDE, POTASSIUM CHLORIDE 236; 22.74; 6.74; 5.86; 2.97 G/4L; G/4L; G/4L; G/4L; G/4L
4 POWDER, FOR SOLUTION ORAL ONCE
Qty: 4000 ML | Refills: 0 | Status: SHIPPED | OUTPATIENT
Start: 2023-02-06 | End: 2023-02-06

## 2023-02-07 RX ORDER — ESTRADIOL 1 MG/1
2 TABLET ORAL DAILY
Qty: 30 TABLET | Refills: 11 | Status: SHIPPED | OUTPATIENT
Start: 2023-02-07 | End: 2023-09-20

## 2023-02-07 RX ORDER — PROGESTERONE 100 MG/1
200 CAPSULE ORAL NIGHTLY
Qty: 30 CAPSULE | Refills: 11 | Status: SHIPPED | OUTPATIENT
Start: 2023-02-07 | End: 2023-09-20

## 2023-02-07 NOTE — TELEPHONE ENCOUNTER
Spoke to patient regarding lab work and alcides-menopausal symptoms. Plan to switch patient to Estradiol 2 mg PO daily and Prometrium 200 mg PO nightly.  Scripts sent to requested pharmacy.  Will add testosterone cream daily as well for low libido.  Script called into [facility].    Pt to follow-up in 3 months.

## 2023-03-09 ENCOUNTER — PATIENT MESSAGE (OUTPATIENT)
Dept: INTERNAL MEDICINE | Facility: CLINIC | Age: 53
End: 2023-03-09
Payer: COMMERCIAL

## 2023-03-09 RX ORDER — PANTOPRAZOLE SODIUM 40 MG/1
40 TABLET, DELAYED RELEASE ORAL DAILY
Qty: 30 TABLET | Refills: 11 | Status: SHIPPED | OUTPATIENT
Start: 2023-03-09 | End: 2024-03-08

## 2023-03-20 DIAGNOSIS — F41.9 ANXIETY AND DEPRESSION: ICD-10-CM

## 2023-03-20 DIAGNOSIS — F32.A ANXIETY AND DEPRESSION: ICD-10-CM

## 2023-03-20 RX ORDER — SERTRALINE HYDROCHLORIDE 100 MG/1
100 TABLET, FILM COATED ORAL DAILY
Qty: 30 TABLET | Refills: 0 | Status: SHIPPED | OUTPATIENT
Start: 2023-03-20 | End: 2023-04-21 | Stop reason: SDUPTHER

## 2023-03-28 ENCOUNTER — ANESTHESIA (OUTPATIENT)
Dept: ENDOSCOPY | Facility: HOSPITAL | Age: 53
End: 2023-03-28
Payer: COMMERCIAL

## 2023-03-28 ENCOUNTER — ANESTHESIA EVENT (OUTPATIENT)
Dept: ENDOSCOPY | Facility: HOSPITAL | Age: 53
End: 2023-03-28
Payer: COMMERCIAL

## 2023-03-28 ENCOUNTER — HOSPITAL ENCOUNTER (OUTPATIENT)
Facility: HOSPITAL | Age: 53
Discharge: HOME | End: 2023-03-28
Attending: INTERNAL MEDICINE | Admitting: INTERNAL MEDICINE
Payer: COMMERCIAL

## 2023-03-28 VITALS
HEIGHT: 63 IN | WEIGHT: 184 LBS | HEART RATE: 55 BPM | DIASTOLIC BLOOD PRESSURE: 59 MMHG | OXYGEN SATURATION: 99 % | BODY MASS INDEX: 32.6 KG/M2 | RESPIRATION RATE: 18 BRPM | TEMPERATURE: 98 F | SYSTOLIC BLOOD PRESSURE: 127 MMHG

## 2023-03-28 DIAGNOSIS — Z12.11 SCREENING FOR COLON CANCER: ICD-10-CM

## 2023-03-28 PROCEDURE — 25000003 PHARM REV CODE 250: Performed by: NURSE ANESTHETIST, CERTIFIED REGISTERED

## 2023-03-28 PROCEDURE — 27201089 HC SNARE, DISP (ANY): Performed by: INTERNAL MEDICINE

## 2023-03-28 PROCEDURE — 45385 PR COLONOSCOPY,REMV LESN,SNARE: ICD-10-PCS | Mod: 33,,, | Performed by: INTERNAL MEDICINE

## 2023-03-28 PROCEDURE — 37000009 HC ANESTHESIA EA ADD 15 MINS: Performed by: INTERNAL MEDICINE

## 2023-03-28 PROCEDURE — E9220 PRA ENDO ANESTHESIA: HCPCS | Mod: 33,,, | Performed by: NURSE ANESTHETIST, CERTIFIED REGISTERED

## 2023-03-28 PROCEDURE — 45385 COLONOSCOPY W/LESION REMOVAL: CPT | Mod: PT | Performed by: INTERNAL MEDICINE

## 2023-03-28 PROCEDURE — 37000008 HC ANESTHESIA 1ST 15 MINUTES: Performed by: INTERNAL MEDICINE

## 2023-03-28 PROCEDURE — 63600175 PHARM REV CODE 636 W HCPCS: Performed by: NURSE ANESTHETIST, CERTIFIED REGISTERED

## 2023-03-28 PROCEDURE — 88305 TISSUE EXAM BY PATHOLOGIST: ICD-10-PCS | Mod: 26,,, | Performed by: PATHOLOGY

## 2023-03-28 PROCEDURE — 88305 TISSUE EXAM BY PATHOLOGIST: CPT | Performed by: PATHOLOGY

## 2023-03-28 PROCEDURE — 25000003 PHARM REV CODE 250: Performed by: INTERNAL MEDICINE

## 2023-03-28 PROCEDURE — E9220 PRA ENDO ANESTHESIA: ICD-10-PCS | Mod: 33,,, | Performed by: NURSE ANESTHETIST, CERTIFIED REGISTERED

## 2023-03-28 PROCEDURE — 45385 COLONOSCOPY W/LESION REMOVAL: CPT | Mod: 33,,, | Performed by: INTERNAL MEDICINE

## 2023-03-28 PROCEDURE — 88305 TISSUE EXAM BY PATHOLOGIST: CPT | Mod: 26,,, | Performed by: PATHOLOGY

## 2023-03-28 RX ORDER — PROPOFOL 10 MG/ML
VIAL (ML) INTRAVENOUS CONTINUOUS PRN
Status: DISCONTINUED | OUTPATIENT
Start: 2023-03-28 | End: 2023-03-28

## 2023-03-28 RX ORDER — SODIUM CHLORIDE 0.9 % (FLUSH) 0.9 %
10 SYRINGE (ML) INJECTION
Status: DISCONTINUED | OUTPATIENT
Start: 2023-03-28 | End: 2023-03-28 | Stop reason: HOSPADM

## 2023-03-28 RX ORDER — LIDOCAINE HYDROCHLORIDE 20 MG/ML
INJECTION INTRAVENOUS
Status: DISCONTINUED | OUTPATIENT
Start: 2023-03-28 | End: 2023-03-28

## 2023-03-28 RX ORDER — SODIUM CHLORIDE 9 MG/ML
INJECTION, SOLUTION INTRAVENOUS CONTINUOUS
Status: DISCONTINUED | OUTPATIENT
Start: 2023-03-28 | End: 2023-03-28 | Stop reason: HOSPADM

## 2023-03-28 RX ORDER — PROPOFOL 10 MG/ML
VIAL (ML) INTRAVENOUS
Status: DISCONTINUED | OUTPATIENT
Start: 2023-03-28 | End: 2023-03-28

## 2023-03-28 RX ADMIN — LIDOCAINE HYDROCHLORIDE 50 MG: 20 INJECTION INTRAVENOUS at 01:03

## 2023-03-28 RX ADMIN — PROPOFOL 80 MG: 10 INJECTION, EMULSION INTRAVENOUS at 01:03

## 2023-03-28 RX ADMIN — SODIUM CHLORIDE: 0.9 INJECTION, SOLUTION INTRAVENOUS at 12:03

## 2023-03-28 RX ADMIN — PROPOFOL 150 MCG/KG/MIN: 10 INJECTION, EMULSION INTRAVENOUS at 01:03

## 2023-03-28 NOTE — ANESTHESIA POSTPROCEDURE EVALUATION
Anesthesia Post Evaluation    Patient: Krystle Austin    Procedure(s) Performed: Procedure(s) (LRB):  COLONOSCOPY (N/A)    Final Anesthesia Type: general      Patient location during evaluation: GI PACU  Patient participation: Yes- Able to Participate  Level of consciousness: awake and alert  Post-procedure vital signs: reviewed and stable  Pain management: adequate  Airway patency: patent    PONV status at discharge: No PONV  Anesthetic complications: no      Cardiovascular status: blood pressure returned to baseline  Respiratory status: unassisted and spontaneous ventilation  Hydration status: euvolemic  Follow-up not needed.          Vitals Value Taken Time   /59 03/28/23 1402   Temp 36.4 °C (97.5 °F) 03/28/23 1332   Pulse 55 03/28/23 1402   Resp 18 03/28/23 1402   SpO2 99 % 03/28/23 1402         Event Time   Out of Recovery 14:09:46         Pain/Zeus Score: Zeus Score: 10 (3/28/2023  1:50 PM)

## 2023-03-28 NOTE — TRANSFER OF CARE
"Anesthesia Transfer of Care Note    Patient: Krystle Austin    Procedure(s) Performed: Procedure(s) (LRB):  COLONOSCOPY (N/A)    Patient location: GI    Anesthesia Type: general    Transport from OR: Transported from OR on room air with adequate spontaneous ventilation    Post pain: adequate analgesia    Post assessment: no apparent anesthetic complications and tolerated procedure well    Post vital signs: stable    Level of consciousness: awake, alert and oriented    Nausea/Vomiting: no nausea/vomiting    Complications: none    Transfer of care protocol was followed      Last vitals:   Visit Vitals  /65   Pulse 68   Temp 36.7 °C (98.1 °F)   Resp 16   Ht 5' 3" (1.6 m)   Wt 83.5 kg (184 lb)   SpO2 99%   Breastfeeding No   BMI 32.59 kg/m²     "

## 2023-03-28 NOTE — PROVATION PATIENT INSTRUCTIONS
Discharge Summary/Instructions after an Endoscopic Procedure  Patient Name: Krystle Austin  Patient MRN: [id number]  Patient YOB: 1970 Tuesday, March 28, 2023  Ervin Loya MD  Dear patient,  As a result of recent federal legislation (The Federal Cures Act), you may   receive lab or pathology results from your procedure in your [facility]   account before your physician is able to contact you. Your physician or   their representative will relay the results to you with their   recommendations at their soonest availability.  Thank you,  RESTRICTIONS:  During your procedure today, you received medications for sedation.  These   medications may affect your judgment, balance and coordination.  Therefore,   for 24 hours, you have the following restrictions:   - DO NOT drive a car, operate machinery, make legal/financial decisions,   sign important papers or drink alcohol.    ACTIVITY:  Today: no heavy lifting, straining or running due to procedural   sedation/anesthesia.  The following day: return to full activity including work.  DIET:  Eat and drink normally unless instructed otherwise.     TREATMENT FOR COMMON SIDE EFFECTS:  - Mild abdominal pain, nausea, belching, bloating or excessive gas:  rest,   eat lightly and use a heating pad.  - Sore Throat: treat with throat lozenges and/or gargle with warm salt   water.  - Because air was used during the procedure, expelling large amounts of air   from your rectum or belching is normal.  - If a bowel prep was taken, you may not have a bowel movement for 1-3 days.    This is normal.  SYMPTOMS TO WATCH FOR AND REPORT TO YOUR PHYSICIAN:  1. Abdominal pain or bloating, other than gas cramps.  2. Chest pain.  3. Back pain.  4. Signs of infection such as: chills or fever occurring within 24 hours   after the procedure.  5. Rectal bleeding, which would show as bright red, maroon, or black stools.   (A tablespoon of blood from the rectum is not serious, especially if    hemorrhoids are present.)  6. Vomiting.  7. Weakness or dizziness.  GO DIRECTLY TO THE NEAREST EMERGENCY ROOM IF YOU HAVE ANY OF THE FOLLOWING:      Difficulty breathing              Chills and/or fever over 101 F   Persistent vomiting and/or vomiting blood   Severe abdominal pain   Severe chest pain   Black, tarry stools   Bleeding- more than one tablespoon   Any other symptom or condition that you feel may need urgent attention  Your doctor recommends these additional instructions:  If any biopsies were taken, your doctors clinic will contact you in 1 to 2   weeks with any results.  - Discharge patient to home (ambulatory).   - Patient has a contact number available for emergencies.  The signs and   symptoms of potential delayed complications were discussed with the   patient.  Return to normal activities tomorrow.  Written discharge   instructions were provided to the patient.   - Resume previous diet.   - Continue present medications.   - Return to primary care physician as previously scheduled.   - Repeat colonoscopy in 7 years for surveillance based on pathology   results.  For questions, problems or results please call your physician - Ervin Loya MD at Work:  [phone].  [facility], EMERGENCY ROOM PHONE NUMBER: [phone]  IF A COMPLICATION OR EMERGENCY SITUATION ARISES AND YOU ARE UNABLE TO REACH   YOUR PHYSICIAN - GO DIRECTLY TO THE EMERGENCY ROOM.  Ervin Loya MD  3/28/2023 1:25:20 PM  This report has been verified and signed electronically.  Dear patient,  As a result of recent federal legislation (The Federal Cures Act), you may   receive lab or pathology results from your procedure in your [facility]   account before your physician is able to contact you. Your physician or   their representative will relay the results to you with their   recommendations at their soonest availability.  Thank you,  PROVATION

## 2023-03-28 NOTE — H&P
Short Stay Endoscopy History and Physical    PCP - Ciatlin King NP  Referring Physician - Caitlin King NP  [address] [place] [address]    Procedure - colonoscopy  ASA - per anesthesia  Mallampati - per anesthesia  History of Anesthesia problems - no  Family history Anesthesia problems -  no   Plan of anesthesia - General    HPI:  This is a 52 y.o. female here for evaluation of: screening    Reflux - no  Dysphagia - no  Abdominal pain - no  Diarrhea - no    ROS:  Constitutional: No fevers, chills, No weight loss  CV: No chest pain  Pulm: No cough, No shortness of breath  GI: see HPI    Medical History:  has a past medical history of Endometriosis, Hypertension, and PONV (postoperative nausea and vomiting).    Surgical History:  has a past surgical history that includes Tonsillectomy (All I can remember is I was in 4th grade.); Hysterectomy (05/2008); Knee cartilage surgery (Left); Bunionectomy (Right, 07/30/2020); and Daija bunionectomy (Right, 07/30/2020).    Family History: family history includes Alcohol abuse in her maternal grandmother; Asthma in her son; COPD in her maternal aunt and maternal uncle; COPD (age of onset: 61) in her mother; Cancer in her maternal grandmother and mother; Learning disabilities in her son; Miscarriages / Stillbirths in her sister..    Social History:  reports that she has never smoked. She has never used smokeless tobacco. She reports current alcohol use of about 2.0 standard drinks per week. She reports that she does not use drugs.    Review of patient's allergies indicates:   Allergen Reactions    Oxycodone-acetaminophen Hives       Medications:   Medications Prior to Admission   Medication Sig Dispense Refill Last Dose    APPLE CIDER VINEGAR ORAL Take by mouth.   Past Week    azelastine (ASTELIN) 137 mcg (0.1 %) nasal spray SMARTSIG:Both Nares   Past Week    biotin 10,000 mcg Cap Take by mouth.   Past Week    calcium carbonate (OS-STEFANIE) 600  mg calcium (1,500 mg) Tab Take 600 mg by mouth once.   Past Week    chlorthalidone (HYGROTEN) 25 MG Tab TAKE ONE TABLET BY MOUTH EVERY DAY 30 tablet 11 3/28/2023    estradioL (ESTRACE) 1 MG tablet Take 2 tablets (2 mg total) by mouth once daily. 30 tablet 11 Past Week    HYDROcodone-acetaminophen (NORCO) 7.5-325 mg per tablet Take 1 tablet by mouth every 12 (twelve) hours as needed.   Past Week    losartan (COZAAR) 25 MG tablet Take 1 tablet (25 mg total) by mouth once daily. 90 tablet 3 3/28/2023    multivit-min/iron/folic/hby521 (HAIR, SKIN AND NAILS ADVANCED ORAL) Take by mouth.   Past Week    multivitamin with minerals tablet Take 1 tablet by mouth once daily.   Past Week    naproxen (NAPROSYN) 500 MG tablet Take 500 mg by mouth 2 (two) times daily as needed.   Past Week    pantoprazole (PROTONIX) 40 MG tablet Take 1 tablet (40 mg total) by mouth once daily. 30 tablet 11 Past Week    progesterone (PROMETRIUM) 100 MG capsule Take 2 capsules (200 mg total) by mouth nightly. 30 capsule 11 Past Week    propranoloL (INDERAL) 20 MG tablet TAKE ONE TABLET BY MOUTH EVERY DAY 30 tablet 3 3/28/2023    sertraline (ZOLOFT) 100 MG tablet Take 1 tablet (100 mg total) by mouth once daily. 30 tablet 0 3/28/2023    valACYclovir (VALTREX) 1000 MG tablet TAKE ONE TABLET BY MOUTH TWICE DAILY FOR 7 DAYS 14 tablet 0 Past Week    CMPD testosterone proprionate 2% in vanicream Apply one click daily to inner thigh or labia nightly 60 g 5        Physical Exam:    Vital Signs:   Vitals:    03/28/23 1221   BP: (!) 123/58   Pulse: 62   Resp: 15   Temp: 98.1 °F (36.7 °C)       General Appearance: Well appearing in no acute distress  Lungs: no labored breathing  CVS:  regular rate  Abdomen: non tender    Labs:  Lab Results   Component Value Date    WBC 8.60 11/11/2022    HGB 15.1 11/11/2022    HCT 45.2 11/11/2022     11/11/2022    CHOL 166 11/11/2022    TRIG 78 11/11/2022    HDL 41 11/11/2022    ALT 18 11/11/2022    AST 19 11/11/2022      (L) 12/06/2022    K 3.3 (L) 12/06/2022    CL 92 (L) 12/06/2022    CREATININE 0.7 12/06/2022    BUN 7 12/06/2022    CO2 28 12/06/2022    TSH 0.949 11/11/2022    INR 0.9 07/08/2020       I have explained the risks and benefits of this endoscopic procedure to the patient including but not limited to bleeding, inflammation, infection, perforation, and death.      Ervin Loya MD

## 2023-03-28 NOTE — ANESTHESIA PREPROCEDURE EVALUATION
03/28/2023  Krystle Austin is a 52 y.o., female.    Active Problem List with Overview Notes    Diagnosis Date Noted    Severe obesity (BMI 35.0-39.9) with comorbidity 11/09/2022    Anxiety and depression 10/14/2020    Tailor's bunion of right foot 07/30/2020    Stiffness of left hand, not elsewhere classified 09/19/2019    Closed nondisplaced fracture of neck of second metacarpal bone of left hand 08/20/2019    Hypertension 08/13/2019     Past Surgical History:   Procedure Laterality Date    BUNIONECTOMY Right 07/30/2020    Procedure: BUNIONECTOMY;  Surgeon: Rajinder Muir DPM;  Location: [facility] OR [address] [place] [address];  Service: Podiatry;  Laterality: Right;    HYSTERECTOMY  05/2008    KNEE CARTILAGE SURGERY Left     MEGHANA BUNIONECTOMY Right 07/30/2020    Procedure: EXCISION, BUNIONETTE;  Surgeon: Rajinder Muir DPM;  Location: [facility] OR [address] [place] [address];  Service: Podiatry;  Laterality: Right;    TONSILLECTOMY  All I can remember is I was in 4th grade.       Pre-op Assessment    I have reviewed the Patient Summary Reports.    I have reviewed the NPO Status.   I have reviewed the Medications.     Review of Systems  Anesthesia Hx:  No problems with previous Anesthesia    Hematology/Oncology:  Hematology Normal   Oncology Normal     EENT/Dental:EENT/Dental Normal   Cardiovascular:   Hypertension    Pulmonary:  Pulmonary Normal    Renal/:  Renal/ Normal     Hepatic/GI:  Hepatic/GI Normal    Musculoskeletal:  Musculoskeletal Normal    Neurological:  Neurology Normal    Endocrine:  Endocrine Normal  Obesity / BMI > 30  Dermatological:  Skin Normal    Psych:   Psychiatric History anxiety depression          Physical Exam  General: Well nourished, Cooperative, Alert and Oriented    Airway:  Mallampati: II   Mouth Opening: Normal  TM Distance: Normal  Tongue: Normal  Neck ROM: Normal  ROM    Dental:  Intact    Chest/Lungs:  Normal Respiratory Rate        Anesthesia Plan  Type of Anesthesia, risks & benefits discussed:    Anesthesia Type: Gen Natural Airway  Intra-op Monitoring Plan: Standard ASA Monitors  Post Op Pain Control Plan: multimodal analgesia  Induction:  IV  Informed Consent: Informed consent signed with the Patient and all parties understand the risks and agree with anesthesia plan.  All questions answered.   ASA Score: 2  Day of Surgery Review of History & Physical: H&P Update referred to the surgeon/provider.    Ready For Surgery From Anesthesia Perspective.     .

## 2023-03-31 LAB
FINAL PATHOLOGIC DIAGNOSIS: NORMAL
GROSS: NORMAL
Lab: NORMAL

## 2023-04-21 DIAGNOSIS — F32.A ANXIETY AND DEPRESSION: ICD-10-CM

## 2023-04-21 DIAGNOSIS — F41.9 ANXIETY AND DEPRESSION: ICD-10-CM

## 2023-04-21 RX ORDER — SERTRALINE HYDROCHLORIDE 100 MG/1
100 TABLET, FILM COATED ORAL DAILY
Qty: 30 TABLET | Refills: 0 | Status: SHIPPED | OUTPATIENT
Start: 2023-04-21 | End: 2023-05-16

## 2023-05-15 DIAGNOSIS — F41.9 ANXIETY AND DEPRESSION: ICD-10-CM

## 2023-05-15 DIAGNOSIS — I10 HYPERTENSION, UNSPECIFIED TYPE: ICD-10-CM

## 2023-05-15 DIAGNOSIS — F32.A ANXIETY AND DEPRESSION: ICD-10-CM

## 2023-05-16 RX ORDER — PROPRANOLOL HYDROCHLORIDE 20 MG/1
TABLET ORAL
Qty: 30 TABLET | Refills: 3 | Status: SHIPPED | OUTPATIENT
Start: 2023-05-16 | End: 2023-08-14

## 2023-05-16 RX ORDER — SERTRALINE HYDROCHLORIDE 100 MG/1
TABLET, FILM COATED ORAL
Qty: 30 TABLET | Refills: 3 | Status: SHIPPED | OUTPATIENT
Start: 2023-05-16 | End: 2023-08-14

## 2023-08-12 DIAGNOSIS — F41.9 ANXIETY AND DEPRESSION: ICD-10-CM

## 2023-08-12 DIAGNOSIS — I10 HYPERTENSION, UNSPECIFIED TYPE: ICD-10-CM

## 2023-08-12 DIAGNOSIS — F32.A ANXIETY AND DEPRESSION: ICD-10-CM

## 2023-08-14 RX ORDER — SERTRALINE HYDROCHLORIDE 100 MG/1
TABLET, FILM COATED ORAL
Qty: 30 TABLET | Refills: 0 | Status: SHIPPED | OUTPATIENT
Start: 2023-08-14 | End: 2023-10-09

## 2023-08-14 RX ORDER — PROPRANOLOL HYDROCHLORIDE 20 MG/1
TABLET ORAL
Qty: 30 TABLET | Refills: 0 | Status: SHIPPED | OUTPATIENT
Start: 2023-08-14 | End: 2023-10-09

## 2023-08-17 ENCOUNTER — PATIENT MESSAGE (OUTPATIENT)
Dept: OBSTETRICS AND GYNECOLOGY | Facility: CLINIC | Age: 53
End: 2023-08-17
Payer: COMMERCIAL

## 2023-09-19 ENCOUNTER — PATIENT MESSAGE (OUTPATIENT)
Dept: OBSTETRICS AND GYNECOLOGY | Facility: CLINIC | Age: 53
End: 2023-09-19
Payer: COMMERCIAL

## 2023-09-20 ENCOUNTER — OFFICE VISIT (OUTPATIENT)
Dept: OBSTETRICS AND GYNECOLOGY | Facility: CLINIC | Age: 53
End: 2023-09-20
Payer: COMMERCIAL

## 2023-09-20 VITALS
SYSTOLIC BLOOD PRESSURE: 125 MMHG | BODY MASS INDEX: 29.88 KG/M2 | DIASTOLIC BLOOD PRESSURE: 84 MMHG | WEIGHT: 168.63 LBS | HEIGHT: 63 IN

## 2023-09-20 DIAGNOSIS — B37.2 CANDIDAL INTERTRIGO: Primary | ICD-10-CM

## 2023-09-20 PROCEDURE — 4010F ACE/ARB THERAPY RXD/TAKEN: CPT | Mod: CPTII,S$GLB,, | Performed by: STUDENT IN AN ORGANIZED HEALTH CARE EDUCATION/TRAINING PROGRAM

## 2023-09-20 PROCEDURE — 3008F PR BODY MASS INDEX (BMI) DOCUMENTED: ICD-10-PCS | Mod: CPTII,S$GLB,, | Performed by: STUDENT IN AN ORGANIZED HEALTH CARE EDUCATION/TRAINING PROGRAM

## 2023-09-20 PROCEDURE — 3074F PR MOST RECENT SYSTOLIC BLOOD PRESSURE < 130 MM HG: ICD-10-PCS | Mod: CPTII,S$GLB,, | Performed by: STUDENT IN AN ORGANIZED HEALTH CARE EDUCATION/TRAINING PROGRAM

## 2023-09-20 PROCEDURE — 99999 PR PBB SHADOW E&M-EST. PATIENT-LVL III: CPT | Mod: PBBFAC,,, | Performed by: STUDENT IN AN ORGANIZED HEALTH CARE EDUCATION/TRAINING PROGRAM

## 2023-09-20 PROCEDURE — 3079F PR MOST RECENT DIASTOLIC BLOOD PRESSURE 80-89 MM HG: ICD-10-PCS | Mod: CPTII,S$GLB,, | Performed by: STUDENT IN AN ORGANIZED HEALTH CARE EDUCATION/TRAINING PROGRAM

## 2023-09-20 PROCEDURE — 1159F PR MEDICATION LIST DOCUMENTED IN MEDICAL RECORD: ICD-10-PCS | Mod: CPTII,S$GLB,, | Performed by: STUDENT IN AN ORGANIZED HEALTH CARE EDUCATION/TRAINING PROGRAM

## 2023-09-20 PROCEDURE — 1159F MED LIST DOCD IN RCRD: CPT | Mod: CPTII,S$GLB,, | Performed by: STUDENT IN AN ORGANIZED HEALTH CARE EDUCATION/TRAINING PROGRAM

## 2023-09-20 PROCEDURE — 99213 PR OFFICE/OUTPT VISIT, EST, LEVL III, 20-29 MIN: ICD-10-PCS | Mod: S$GLB,,, | Performed by: STUDENT IN AN ORGANIZED HEALTH CARE EDUCATION/TRAINING PROGRAM

## 2023-09-20 PROCEDURE — 99213 OFFICE O/P EST LOW 20 MIN: CPT | Mod: S$GLB,,, | Performed by: STUDENT IN AN ORGANIZED HEALTH CARE EDUCATION/TRAINING PROGRAM

## 2023-09-20 PROCEDURE — 3074F SYST BP LT 130 MM HG: CPT | Mod: CPTII,S$GLB,, | Performed by: STUDENT IN AN ORGANIZED HEALTH CARE EDUCATION/TRAINING PROGRAM

## 2023-09-20 PROCEDURE — 3008F BODY MASS INDEX DOCD: CPT | Mod: CPTII,S$GLB,, | Performed by: STUDENT IN AN ORGANIZED HEALTH CARE EDUCATION/TRAINING PROGRAM

## 2023-09-20 PROCEDURE — 1160F PR REVIEW ALL MEDS BY PRESCRIBER/CLIN PHARMACIST DOCUMENTED: ICD-10-PCS | Mod: CPTII,S$GLB,, | Performed by: STUDENT IN AN ORGANIZED HEALTH CARE EDUCATION/TRAINING PROGRAM

## 2023-09-20 PROCEDURE — 4010F PR ACE/ARB THEARPY RXD/TAKEN: ICD-10-PCS | Mod: CPTII,S$GLB,, | Performed by: STUDENT IN AN ORGANIZED HEALTH CARE EDUCATION/TRAINING PROGRAM

## 2023-09-20 PROCEDURE — 3079F DIAST BP 80-89 MM HG: CPT | Mod: CPTII,S$GLB,, | Performed by: STUDENT IN AN ORGANIZED HEALTH CARE EDUCATION/TRAINING PROGRAM

## 2023-09-20 PROCEDURE — 99999 PR PBB SHADOW E&M-EST. PATIENT-LVL III: ICD-10-PCS | Mod: PBBFAC,,, | Performed by: STUDENT IN AN ORGANIZED HEALTH CARE EDUCATION/TRAINING PROGRAM

## 2023-09-20 PROCEDURE — 1160F RVW MEDS BY RX/DR IN RCRD: CPT | Mod: CPTII,S$GLB,, | Performed by: STUDENT IN AN ORGANIZED HEALTH CARE EDUCATION/TRAINING PROGRAM

## 2023-09-20 RX ORDER — NYSTATIN 100000 [USP'U]/G
POWDER TOPICAL
Qty: 30 G | Refills: 0 | Status: SHIPPED | OUTPATIENT
Start: 2023-09-20

## 2023-09-20 RX ORDER — FLUCONAZOLE 150 MG/1
150 TABLET ORAL DAILY
Qty: 1 TABLET | Refills: 0 | Status: SHIPPED | OUTPATIENT
Start: 2023-09-20 | End: 2023-09-21

## 2023-09-20 RX ORDER — PROGESTERONE 200 MG/1
200 CAPSULE ORAL NIGHTLY
COMMUNITY
Start: 2023-09-18 | End: 2023-10-17 | Stop reason: SDUPTHER

## 2023-09-20 RX ORDER — ESTRADIOL 2 MG/1
2 TABLET ORAL
COMMUNITY
Start: 2023-09-09 | End: 2023-10-17 | Stop reason: SDUPTHER

## 2023-09-20 NOTE — PROGRESS NOTES
"  Chief Complaint: Breast Rash     HPI:      Krystle Austin is a 52 y.o.  who presents complaining of red rash under bilateral breasts. Onset 1 week ago. Worse under the right breast. Has tried OTC hydrocortisone, A&D ointment, zsorb powder with no change. Denies pruritus or pain.  H/o hysterectomy. No prior episodes.     Previous Mammogram: BiRads: 1 T-C Score: 4% (2/3/23)     Physical Exam:      PHYSICAL EXAM:  /84   Ht 5' 3" (1.6 m)   Wt 76.5 kg (168 lb 10.4 oz)   BMI 29.88 kg/m²   Body mass index is 29.88 kg/m².      APPEARANCE: Well nourished, well developed, in no acute distress.  BREASTS: Right: normal in size and symmetry, normal contour with no evidence of flattening or dimpling, nipples everted without rashes or discharge, palpation negative for masses or nodules. Left: normal in size and symmetry, normal contour with no evidence of flattening or dimpling, nipples everted without rashes or discharge, palpation negative for masses or nodules. Bilateral erythema in inframammary folds, worse under right breast.  ABDOMEN: Soft.  No tenderness or masses.      Assessment/Plan:     Candidal intertrigo  -     fluconazole (DIFLUCAN) 150 MG Tab; Take 1 tablet (150 mg total) by mouth once daily. for 1 day  Dispense: 1 tablet; Refill: 0  -     nystatin (MYCOSTATIN) powder; Apply to affected area 3 times daily  Dispense: 30 g; Refill: 0    - breast exam c/w candida, Rx diflucan and nystatin powder to pharmacy  - discussed to inform me if not improved by next week   - we also discussed going braless or wearing wire-free bras in the meantime to prevent irritation     Natacha Lucero MD  Obstetrics and Gynecology  [facility]    "

## 2023-10-07 DIAGNOSIS — I10 HYPERTENSION, UNSPECIFIED TYPE: ICD-10-CM

## 2023-10-07 DIAGNOSIS — F41.9 ANXIETY AND DEPRESSION: ICD-10-CM

## 2023-10-07 DIAGNOSIS — F32.A ANXIETY AND DEPRESSION: ICD-10-CM

## 2023-10-08 NOTE — TELEPHONE ENCOUNTER
Refill Routing Note   Medication(s) are not appropriate for processing by [facility] for the following reason(s):      Non-participating provider:     [facility] action(s):  Route Care Due:  None identified   Medication Therapy Plan:         Appointments  past 12m or future 3m with PCP    Date Provider   Last Visit   12/6/2022 Caitlin King NP   Next Visit   11/7/2023 Caitlin King NP   ED visits in past 90 days: 0        Note composed:8:45 PM 10/07/2023

## 2023-10-09 RX ORDER — PROPRANOLOL HYDROCHLORIDE 20 MG/1
TABLET ORAL
Qty: 30 TABLET | Refills: 0 | Status: SHIPPED | OUTPATIENT
Start: 2023-10-09 | End: 2023-11-07 | Stop reason: SDUPTHER

## 2023-10-09 RX ORDER — SERTRALINE HYDROCHLORIDE 100 MG/1
TABLET, FILM COATED ORAL
Qty: 30 TABLET | Refills: 0 | Status: SHIPPED | OUTPATIENT
Start: 2023-10-09 | End: 2023-11-14

## 2023-10-16 ENCOUNTER — PATIENT MESSAGE (OUTPATIENT)
Dept: OBSTETRICS AND GYNECOLOGY | Facility: CLINIC | Age: 53
End: 2023-10-16
Payer: COMMERCIAL

## 2023-10-17 RX ORDER — PROGESTERONE 200 MG/1
200 CAPSULE ORAL NIGHTLY
Qty: 30 CAPSULE | Refills: 11 | Status: SHIPPED | OUTPATIENT
Start: 2023-10-17 | End: 2024-10-16

## 2023-10-17 RX ORDER — ESTRADIOL 2 MG/1
2 TABLET ORAL DAILY
Qty: 30 TABLET | Refills: 11 | Status: SHIPPED | OUTPATIENT
Start: 2023-10-17 | End: 2024-10-16

## 2023-10-28 DIAGNOSIS — I10 HYPERTENSION, UNSPECIFIED TYPE: ICD-10-CM

## 2023-10-30 RX ORDER — LOSARTAN POTASSIUM 25 MG/1
25 TABLET ORAL
Qty: 90 TABLET | Refills: 0 | Status: SHIPPED | OUTPATIENT
Start: 2023-10-30 | End: 2023-11-08 | Stop reason: SDUPTHER

## 2023-11-07 ENCOUNTER — LAB VISIT (OUTPATIENT)
Dept: LAB | Facility: HOSPITAL | Age: 53
End: 2023-11-07
Payer: COMMERCIAL

## 2023-11-07 ENCOUNTER — OFFICE VISIT (OUTPATIENT)
Dept: INTERNAL MEDICINE | Facility: CLINIC | Age: 53
End: 2023-11-07
Payer: COMMERCIAL

## 2023-11-07 VITALS
HEIGHT: 63 IN | OXYGEN SATURATION: 98 % | DIASTOLIC BLOOD PRESSURE: 82 MMHG | SYSTOLIC BLOOD PRESSURE: 110 MMHG | WEIGHT: 169.06 LBS | BODY MASS INDEX: 29.95 KG/M2 | HEART RATE: 81 BPM

## 2023-11-07 DIAGNOSIS — I10 HYPERTENSION, UNSPECIFIED TYPE: ICD-10-CM

## 2023-11-07 DIAGNOSIS — F32.A ANXIETY AND DEPRESSION: ICD-10-CM

## 2023-11-07 DIAGNOSIS — Z00.00 ANNUAL PHYSICAL EXAM: ICD-10-CM

## 2023-11-07 DIAGNOSIS — F41.9 ANXIETY AND DEPRESSION: ICD-10-CM

## 2023-11-07 DIAGNOSIS — R53.83 FATIGUE, UNSPECIFIED TYPE: ICD-10-CM

## 2023-11-07 DIAGNOSIS — Z00.00 ANNUAL PHYSICAL EXAM: Primary | ICD-10-CM

## 2023-11-07 DIAGNOSIS — E66.3 OVERWEIGHT WITH BODY MASS INDEX (BMI) 25.0-29.9: ICD-10-CM

## 2023-11-07 PROBLEM — E66.01 SEVERE OBESITY (BMI 35.0-39.9) WITH COMORBIDITY: Status: RESOLVED | Noted: 2022-11-09 | Resolved: 2023-11-07

## 2023-11-07 LAB
25(OH)D3+25(OH)D2 SERPL-MCNC: 49 NG/ML (ref 30–96)
ALBUMIN SERPL BCP-MCNC: 3.9 G/DL (ref 3.5–5.2)
ALP SERPL-CCNC: 73 U/L (ref 55–135)
ALT SERPL W/O P-5'-P-CCNC: 13 U/L (ref 10–44)
ANION GAP SERPL CALC-SCNC: 15 MMOL/L (ref 8–16)
AST SERPL-CCNC: 17 U/L (ref 10–40)
BASOPHILS # BLD AUTO: 0.04 K/UL (ref 0–0.2)
BASOPHILS NFR BLD: 0.4 % (ref 0–1.9)
BILIRUB SERPL-MCNC: 0.6 MG/DL (ref 0.1–1)
BUN SERPL-MCNC: 17 MG/DL (ref 6–20)
CALCIUM SERPL-MCNC: 9.9 MG/DL (ref 8.7–10.5)
CHLORIDE SERPL-SCNC: 96 MMOL/L (ref 95–110)
CHOLEST SERPL-MCNC: 226 MG/DL (ref 120–199)
CHOLEST/HDLC SERPL: 4 {RATIO} (ref 2–5)
CO2 SERPL-SCNC: 28 MMOL/L (ref 23–29)
CREAT SERPL-MCNC: 0.7 MG/DL (ref 0.5–1.4)
DIFFERENTIAL METHOD: ABNORMAL
EOSINOPHIL # BLD AUTO: 0.1 K/UL (ref 0–0.5)
EOSINOPHIL NFR BLD: 0.6 % (ref 0–8)
ERYTHROCYTE [DISTWIDTH] IN BLOOD BY AUTOMATED COUNT: 12.6 % (ref 11.5–14.5)
EST. GFR  (NO RACE VARIABLE): >60 ML/MIN/1.73 M^2
ESTIMATED AVG GLUCOSE: 105 MG/DL (ref 68–131)
FERRITIN SERPL-MCNC: 175 NG/ML (ref 20–300)
GLUCOSE SERPL-MCNC: 86 MG/DL (ref 70–110)
HBA1C MFR BLD: 5.3 % (ref 4–5.6)
HCT VFR BLD AUTO: 43.9 % (ref 37–48.5)
HDLC SERPL-MCNC: 56 MG/DL (ref 40–75)
HDLC SERPL: 24.8 % (ref 20–50)
HGB BLD-MCNC: 14.7 G/DL (ref 12–16)
IMM GRANULOCYTES # BLD AUTO: 0.03 K/UL (ref 0–0.04)
IMM GRANULOCYTES NFR BLD AUTO: 0.3 % (ref 0–0.5)
IRON SERPL-MCNC: 123 UG/DL (ref 30–160)
LDLC SERPL CALC-MCNC: 137.2 MG/DL (ref 63–159)
LYMPHOCYTES # BLD AUTO: 1.7 K/UL (ref 1–4.8)
LYMPHOCYTES NFR BLD: 18.3 % (ref 18–48)
MCH RBC QN AUTO: 29.5 PG (ref 27–31)
MCHC RBC AUTO-ENTMCNC: 33.5 G/DL (ref 32–36)
MCV RBC AUTO: 88 FL (ref 82–98)
MONOCYTES # BLD AUTO: 0.6 K/UL (ref 0.3–1)
MONOCYTES NFR BLD: 6.3 % (ref 4–15)
NEUTROPHILS # BLD AUTO: 6.7 K/UL (ref 1.8–7.7)
NEUTROPHILS NFR BLD: 74.1 % (ref 38–73)
NONHDLC SERPL-MCNC: 170 MG/DL
NRBC BLD-RTO: 0 /100 WBC
PLATELET # BLD AUTO: 413 K/UL (ref 150–450)
PMV BLD AUTO: 11.1 FL (ref 9.2–12.9)
POTASSIUM SERPL-SCNC: 3.1 MMOL/L (ref 3.5–5.1)
PROT SERPL-MCNC: 8 G/DL (ref 6–8.4)
RBC # BLD AUTO: 4.99 M/UL (ref 4–5.4)
SATURATED IRON: 26 % (ref 20–50)
SODIUM SERPL-SCNC: 139 MMOL/L (ref 136–145)
TOTAL IRON BINDING CAPACITY: 469 UG/DL (ref 250–450)
TRANSFERRIN SERPL-MCNC: 317 MG/DL (ref 200–375)
TRIGL SERPL-MCNC: 164 MG/DL (ref 30–150)
TSH SERPL DL<=0.005 MIU/L-ACNC: 0.7 UIU/ML (ref 0.4–4)
WBC # BLD AUTO: 9.07 K/UL (ref 3.9–12.7)

## 2023-11-07 PROCEDURE — 3079F DIAST BP 80-89 MM HG: CPT | Mod: CPTII,S$GLB,, | Performed by: NURSE PRACTITIONER

## 2023-11-07 PROCEDURE — 99999 PR PBB SHADOW E&M-EST. PATIENT-LVL IV: ICD-10-PCS | Mod: PBBFAC,,, | Performed by: NURSE PRACTITIONER

## 2023-11-07 PROCEDURE — 82728 ASSAY OF FERRITIN: CPT | Performed by: NURSE PRACTITIONER

## 2023-11-07 PROCEDURE — 90686 IIV4 VACC NO PRSV 0.5 ML IM: CPT | Mod: S$GLB,,, | Performed by: NURSE PRACTITIONER

## 2023-11-07 PROCEDURE — 99999 PR PBB SHADOW E&M-EST. PATIENT-LVL IV: CPT | Mod: PBBFAC,,, | Performed by: NURSE PRACTITIONER

## 2023-11-07 PROCEDURE — 1159F PR MEDICATION LIST DOCUMENTED IN MEDICAL RECORD: ICD-10-PCS | Mod: CPTII,S$GLB,, | Performed by: NURSE PRACTITIONER

## 2023-11-07 PROCEDURE — 3079F PR MOST RECENT DIASTOLIC BLOOD PRESSURE 80-89 MM HG: ICD-10-PCS | Mod: CPTII,S$GLB,, | Performed by: NURSE PRACTITIONER

## 2023-11-07 PROCEDURE — 1159F MED LIST DOCD IN RCRD: CPT | Mod: CPTII,S$GLB,, | Performed by: NURSE PRACTITIONER

## 2023-11-07 PROCEDURE — 4010F PR ACE/ARB THEARPY RXD/TAKEN: ICD-10-PCS | Mod: CPTII,S$GLB,, | Performed by: NURSE PRACTITIONER

## 2023-11-07 PROCEDURE — 83036 HEMOGLOBIN GLYCOSYLATED A1C: CPT | Performed by: NURSE PRACTITIONER

## 2023-11-07 PROCEDURE — 4010F ACE/ARB THERAPY RXD/TAKEN: CPT | Mod: CPTII,S$GLB,, | Performed by: NURSE PRACTITIONER

## 2023-11-07 PROCEDURE — 80053 COMPREHEN METABOLIC PANEL: CPT | Performed by: NURSE PRACTITIONER

## 2023-11-07 PROCEDURE — 3008F BODY MASS INDEX DOCD: CPT | Mod: CPTII,S$GLB,, | Performed by: NURSE PRACTITIONER

## 2023-11-07 PROCEDURE — 82607 VITAMIN B-12: CPT | Performed by: NURSE PRACTITIONER

## 2023-11-07 PROCEDURE — G0008 FLU VACCINE (QUAD) GREATER THAN OR EQUAL TO 3YO PRESERVATIVE FREE IM: ICD-10-PCS | Mod: S$GLB,,, | Performed by: NURSE PRACTITIONER

## 2023-11-07 PROCEDURE — 85025 COMPLETE CBC W/AUTO DIFF WBC: CPT | Performed by: NURSE PRACTITIONER

## 2023-11-07 PROCEDURE — 82306 VITAMIN D 25 HYDROXY: CPT | Performed by: NURSE PRACTITIONER

## 2023-11-07 PROCEDURE — 84443 ASSAY THYROID STIM HORMONE: CPT | Performed by: NURSE PRACTITIONER

## 2023-11-07 PROCEDURE — 3008F PR BODY MASS INDEX (BMI) DOCUMENTED: ICD-10-PCS | Mod: CPTII,S$GLB,, | Performed by: NURSE PRACTITIONER

## 2023-11-07 PROCEDURE — 84466 ASSAY OF TRANSFERRIN: CPT | Performed by: NURSE PRACTITIONER

## 2023-11-07 PROCEDURE — 3074F SYST BP LT 130 MM HG: CPT | Mod: CPTII,S$GLB,, | Performed by: NURSE PRACTITIONER

## 2023-11-07 PROCEDURE — 80061 LIPID PANEL: CPT | Performed by: NURSE PRACTITIONER

## 2023-11-07 PROCEDURE — 99396 PR PREVENTIVE VISIT,EST,40-64: ICD-10-PCS | Mod: 25,S$GLB,, | Performed by: NURSE PRACTITIONER

## 2023-11-07 PROCEDURE — 99396 PREV VISIT EST AGE 40-64: CPT | Mod: 25,S$GLB,, | Performed by: NURSE PRACTITIONER

## 2023-11-07 PROCEDURE — G0008 ADMIN INFLUENZA VIRUS VAC: HCPCS | Mod: S$GLB,,, | Performed by: NURSE PRACTITIONER

## 2023-11-07 PROCEDURE — 36415 COLL VENOUS BLD VENIPUNCTURE: CPT | Performed by: NURSE PRACTITIONER

## 2023-11-07 PROCEDURE — 83540 ASSAY OF IRON: CPT | Performed by: NURSE PRACTITIONER

## 2023-11-07 PROCEDURE — 3074F PR MOST RECENT SYSTOLIC BLOOD PRESSURE < 130 MM HG: ICD-10-PCS | Mod: CPTII,S$GLB,, | Performed by: NURSE PRACTITIONER

## 2023-11-07 PROCEDURE — 90686 FLU VACCINE (QUAD) GREATER THAN OR EQUAL TO 3YO PRESERVATIVE FREE IM: ICD-10-PCS | Mod: S$GLB,,, | Performed by: NURSE PRACTITIONER

## 2023-11-07 RX ORDER — PROPRANOLOL HYDROCHLORIDE 10 MG/1
10 TABLET ORAL DAILY
Qty: 90 TABLET | Refills: 3 | Status: SHIPPED | OUTPATIENT
Start: 2023-11-07

## 2023-11-07 NOTE — PROGRESS NOTES
Internal Medicine Annual Exam       CHIEF COMPLAINT     The patient, Krystle Austin, who is a 52 y.o. female presents for an annual exam.    HPI     HTN- taking losartan 25mg and chlorthalidone     Has lost 30 lbs on semaglutide     Anxiety and depression- taking zoloft and inderal     Fatigue- needing a nap every day at 3pm.     HM-  CRCS- 3/28/2023  Tdap-2015  Flu- 11/7/2023  MMG- 2/3/2023          Past Medical History:  Past Medical History:   Diagnosis Date    Endometriosis     Hypertension     PONV (postoperative nausea and vomiting)        Past Surgical History:   Procedure Laterality Date    BUNIONECTOMY Right 07/30/2020    Procedure: BUNIONECTOMY;  Surgeon: Rajinder Muir DPM;  [facility];  Service: Podiatry;  Laterality: Right;    COLONOSCOPY N/A 3/28/2023    Procedure: COLONOSCOPY;  Surgeon: Ervin Loya MD;  Location: [facility] ENDO (4TH FLR);  Service: Endoscopy;  Laterality: N/A;  instr portal-GT  pre call done 3/22 -     HYSTERECTOMY  05/2008    KNEE CARTILAGE SURGERY Left     MEGHANA BUNIONECTOMY Right 07/30/2020    Procedure: EXCISION, BUNIONETTE;  Surgeon: Rajinder Muir DPM;  [facility];  Service: Podiatry;  Laterality: Right;    TONSILLECTOMY  All I can remember is I was in 4th grade.        Family History   Problem Relation Age of Onset    Alcohol abuse Maternal Grandmother     Cancer Maternal Grandmother         unsure - metastatic     Asthma Son     Cancer Mother         lung     COPD Mother 61        lung     Learning disabilities Son     Miscarriages / Stillbirths Sister     COPD Maternal Aunt     COPD Maternal Uncle         Social History     Socioeconomic History    Marital status:    Tobacco Use    Smoking status: Never    Smokeless tobacco: Never   Substance and Sexual Activity    Alcohol use: Yes     Alcohol/week: 2.0 standard drinks of alcohol     Types: 2 Cans of beer per week     Comment: occasionally    Drug use: Never    Sexual activity:  Yes     Partners: Male     Birth control/protection: None     Social Determinants of Health     Financial Resource Strain: Low Risk  (12/3/2022)    Overall Financial Resource Strain (CARDIA)     Difficulty of Paying Living Expenses: Not hard at all   Food Insecurity: No Food Insecurity (11/5/2023)    Hunger Vital Sign     Worried About Running Out of Food in the Last Year: Never true     Ran Out of Food in the Last Year: Never true   Transportation Needs: No Transportation Needs (11/5/2023)    PRAPARE - Transportation     Lack of Transportation (Medical): No     Lack of Transportation (Non-Medical): No   Physical Activity: Sufficiently Active (11/5/2023)    Exercise Vital Sign     Days of Exercise per Week: 7 days     Minutes of Exercise per Session: 40 min   Stress: No Stress Concern Present (11/5/2023)    [place] [employer] - Occupational Stress Questionnaire     Feeling of Stress : Only a little   Social Connections: Unknown (11/5/2023)    Social Connection and Isolation Panel [NHANES]     Frequency of Communication with Friends and Family: More than three times a week     Frequency of Social Gatherings with Friends and Family: Three times a week     Active Member of Clubs or Organizations: No     Attends Club or Organization Meetings: Never     Marital Status:    Housing Stability: Low Risk  (11/5/2023)    Housing Stability Vital Sign     Unable to Pay for Housing in the Last Year: No     Number of Places Lived in the Last Year: 1     Unstable Housing in the Last Year: No        Social History     Tobacco Use   Smoking Status Never   Smokeless Tobacco Never        Allergies as of 11/07/2023 - Reviewed 11/07/2023   Allergen Reaction Noted    Oxycodone-acetaminophen Hives 08/13/2019          Home Medications:  Prior to Admission medications    Medication Sig Start Date End Date Taking? Authorizing Provider   APPLE CIDER VINEGAR ORAL Take by mouth.   Yes Provider, Historical    azelastine (ASTELIN) 137 mcg (0.1 %) nasal spray SMARTSIG:Both Nares 10/28/22  Yes Provider, Historical   calcium carbonate (OS-STEFANIE) 600 mg calcium (1,500 mg) Tab Take 600 mg by mouth once.   Yes Provider, Historical   chlorthalidone (HYGROTEN) 25 MG Tab TAKE ONE TABLET BY MOUTH EVERY DAY 12/27/22  Yes Caitlin King NP   estradioL (ESTRACE) 2 MG tablet Take 1 tablet (2 mg total) by mouth once daily. 10/17/23 10/16/24 Yes Mariely Lauren DNP   losartan (COZAAR) 25 MG tablet TAKE ONE TABLET BY MOUTH EVERY DAY 10/30/23  Yes Alissa Leon MD   multivit-min/iron/folic/bdz134 (HAIR, SKIN AND NAILS ADVANCED ORAL) Take by mouth.   Yes Provider, Historical   multivitamin with minerals tablet Take 1 tablet by mouth once daily.   Yes Provider, Historical   naproxen (NAPROSYN) 500 MG tablet Take 500 mg by mouth 2 (two) times daily as needed. 1/10/23  Yes Provider, Historical   nystatin (MYCOSTATIN) powder Apply to affected area 3 times daily 9/20/23  Yes Natacha Lucero MD   pantoprazole (PROTONIX) 40 MG tablet Take 1 tablet (40 mg total) by mouth once daily. 3/9/23 3/8/24 Yes Caitlin King NP   progesterone (PROMETRIUM) 200 MG capsule Take 1 capsule (200 mg total) by mouth every evening. 10/17/23 10/16/24 Yes Mariely Lauren DNP   propranoloL (INDERAL) 20 MG tablet TAKE ONE TABLET BY MOUTH EVERY DAY 10/9/23  Yes Alissa Leon MD   sertraline (ZOLOFT) 100 MG tablet TAKE ONE TABLET BY MOUTH EVERY DAY 10/9/23  Yes Alissa Leon MD   valACYclovir (VALTREX) 1000 MG tablet TAKE ONE TABLET BY MOUTH TWICE DAILY FOR 7 DAYS 3/2/22  Yes Caitlin King NP       Review of Systems:  Review of Systems   Constitutional:  Negative for activity change and unexpected weight change.   HENT:  Negative for hearing loss, rhinorrhea and trouble swallowing.    Eyes:  Negative for discharge and visual disturbance.   Respiratory:  Negative for chest tightness and wheezing.    Cardiovascular:  Negative for chest pain  "and palpitations.   Gastrointestinal:  Negative for blood in stool, constipation, diarrhea and vomiting.   Endocrine: Negative for polydipsia and polyuria.   Genitourinary:  Negative for difficulty urinating, dysuria, hematuria and menstrual problem.   Musculoskeletal:  Negative for arthralgias, joint swelling and neck pain.   Neurological:  Positive for headaches. Negative for weakness.   Psychiatric/Behavioral:  Positive for dysphoric mood. Negative for confusion.        Health Maintainence:   Immunizations:  Health Maintenance         Date Due Completion Date    Hepatitis C Screening Never done ---    HIV Screening Never done ---    Hemoglobin A1c (Diabetic Prevention Screening) 09/15/2021 9/15/2018    Shingles Vaccine (1 of 2) Never done ---    COVID-19 Vaccine (4 - 2023-24 season) 09/01/2023 12/15/2021    Mammogram 02/03/2024 2/3/2023    Override on 7/9/2019: Done    TETANUS VACCINE 03/22/2025 3/22/2015    Lipid Panel 11/11/2027 11/11/2022    Colorectal Cancer Screening 03/28/2030 3/28/2023             PHYSICAL EXAM     /82 (BP Location: Left arm, Patient Position: Sitting, BP Method: Medium (Manual))   Pulse 81   Ht 5' 3" (1.6 m)   Wt 76.7 kg (169 lb 1.5 oz)   SpO2 98%   BMI 29.95 kg/m²  Body mass index is 29.95 kg/m².    Physical Exam  Vitals reviewed.   Constitutional:       Appearance: She is well-developed.   HENT:      Head: Normocephalic.      Right Ear: External ear normal.      Left Ear: External ear normal.      Nose: Nose normal.      Mouth/Throat:      Pharynx: No oropharyngeal exudate.   Eyes:      Pupils: Pupils are equal, round, and reactive to light.   Neck:      Thyroid: No thyromegaly.      Vascular: No JVD.      Trachea: No tracheal deviation.   Cardiovascular:      Rate and Rhythm: Normal rate and regular rhythm.      Heart sounds: Normal heart sounds. No murmur heard.     No friction rub. No gallop.   Pulmonary:      Effort: Pulmonary effort is normal. No respiratory distress.    " "  Breath sounds: Normal breath sounds. No wheezing or rales.   Abdominal:      General: Bowel sounds are normal. There is no distension.      Palpations: Abdomen is soft.      Tenderness: There is no abdominal tenderness.   Musculoskeletal:         General: No tenderness. Normal range of motion.      Cervical back: Neck supple.   Lymphadenopathy:      Cervical: No cervical adenopathy.   Skin:     General: Skin is warm and dry.      Findings: No rash.   Neurological:      Mental Status: She is alert and oriented to person, place, and time.   Psychiatric:         Behavior: Behavior normal.         LABS     No results found for: "LABA1C", "HGBA1C"  CMP  Sodium   Date Value Ref Range Status   12/06/2022 131 (L) 136 - 145 mmol/L Final     Potassium   Date Value Ref Range Status   12/06/2022 3.3 (L) 3.5 - 5.1 mmol/L Final     Chloride   Date Value Ref Range Status   12/06/2022 92 (L) 95 - 110 mmol/L Final     CO2   Date Value Ref Range Status   12/06/2022 28 23 - 29 mmol/L Final     Glucose   Date Value Ref Range Status   12/06/2022 100 70 - 110 mg/dL Final     BUN   Date Value Ref Range Status   12/06/2022 7 6 - 20 mg/dL Final     Creatinine   Date Value Ref Range Status   12/06/2022 0.7 0.5 - 1.4 mg/dL Final     Calcium   Date Value Ref Range Status   12/06/2022 10.3 8.7 - 10.5 mg/dL Final     Total Protein   Date Value Ref Range Status   11/11/2022 7.6 6.0 - 8.4 g/dL Final     Albumin   Date Value Ref Range Status   11/11/2022 4.0 3.5 - 5.2 g/dL Final     Total Bilirubin   Date Value Ref Range Status   11/11/2022 0.7 0.1 - 1.0 mg/dL Final     Comment:     For infants and newborns, interpretation of results should be based  on gestational age, weight and in agreement with clinical  observations.    Premature Infant recommended reference ranges:  Up to 24 hours.............<8.0 mg/dL  Up to 48 hours............<12.0 mg/dL  3-5 days..................<15.0 mg/dL  6-29 days.................<15.0 mg/dL       Alkaline " Phosphatase   Date Value Ref Range Status   11/11/2022 63 55 - 135 U/L Final     AST   Date Value Ref Range Status   11/11/2022 19 10 - 40 U/L Final     ALT   Date Value Ref Range Status   11/11/2022 18 10 - 44 U/L Final     Anion Gap   Date Value Ref Range Status   12/06/2022 11 8 - 16 mmol/L Final     eGFR if    Date Value Ref Range Status   10/26/2021 >60.0 >60 mL/min/1.73 m^2 Final     eGFR if non    Date Value Ref Range Status   10/26/2021 >60.0 >60 mL/min/1.73 m^2 Final     Comment:     Calculation used to obtain the estimated glomerular filtration  rate (eGFR) is the CKD-EPI equation.        Lab Results   Component Value Date    WBC 8.60 11/11/2022    HGB 15.1 11/11/2022    HCT 45.2 11/11/2022    MCV 88 11/11/2022     11/11/2022     Lab Results   Component Value Date    CHOL 166 11/11/2022    CHOL 162 10/26/2021    CHOL 155 08/13/2019     Lab Results   Component Value Date    HDL 41 11/11/2022    HDL 47 10/26/2021    HDL 51 08/13/2019     Lab Results   Component Value Date    LDLCALC 109.4 11/11/2022    LDLCALC 104.0 10/26/2021    LDLCALC 86.0 08/13/2019     Lab Results   Component Value Date    TRIG 78 11/11/2022    TRIG 55 10/26/2021    TRIG 90 08/13/2019     Lab Results   Component Value Date    CHOLHDL 24.7 11/11/2022    CHOLHDL 29.0 10/26/2021    CHOLHDL 32.9 08/13/2019     Lab Results   Component Value Date    TSH 0.949 11/11/2022       ASSESSMENT/PLAN     Krystle Austin is a 52 y.o. female    Annual physical exam- All age and gender related screenings discussed   -     CBC Auto Differential; Future; Expected date: 11/07/2023  -     Comprehensive Metabolic Panel; Future; Expected date: 11/07/2023  -     Hemoglobin A1C; Future; Expected date: 11/07/2023  -     Lipid Panel; Future; Expected date: 11/07/2023  -     TSH; Future; Expected date: 11/07/2023  -     Vitamin D; Future; Expected date: 11/07/2023  -     Vitamin B12 Deficiency Panel; Future; Expected date:  11/07/2023  -     Iron and TIBC; Future; Expected date: 11/07/2023  -     Ferritin; Future; Expected date: 11/07/2023    Anxiety and depression- stable. Will cont zoloft     Hypertension, unspecified type- stable. Will cont losartan and HCTZ and decrease propranolol to 10mg   -     propranoloL (INDERAL) 10 MG tablet; Take 1 tablet (10 mg total) by mouth once daily.  Dispense: 90 tablet; Refill: 3    Overweight with body mass index (BMI) 25.0-29.9- stable. Will cont portion control diet.     Fatigue, unspecified type- check labs. Will monitor BP   -     TSH; Future; Expected date: 11/07/2023  -     Vitamin D; Future; Expected date: 11/07/2023  -     Vitamin B12 Deficiency Panel; Future; Expected date: 11/07/2023  -     Iron and TIBC; Future; Expected date: 11/07/2023  -     Ferritin; Future; Expected date: 11/07/2023    Other orders  -     Influenza - Quadrivalent (PF)           Follow up with PCP     Caitlin WALKER, APRN, FNP-c   Department of Internal Medicine - [facility]  8:14 AM

## 2023-11-07 NOTE — PROGRESS NOTES
Two patient Identifier confirmed. (Name and ) Patient tolerated shot well. Patient was instructed to wait 15 minutes after injection.    Answers submitted by the patient for this visit:  Review of Systems Questionnaire (Submitted on 2023)  activity change: No  unexpected weight change: No  neck pain: No  hearing loss: No  rhinorrhea: No  trouble swallowing: No  eye discharge: No  visual disturbance: No  chest tightness: No  wheezing: No  chest pain: No  palpitations: No  blood in stool: No  constipation: No  vomiting: No  diarrhea: No  polydipsia: No  polyuria: No  difficulty urinating: No  hematuria: No  menstrual problem: No  dysuria: No  joint swelling: No  arthralgias: No  headaches: Yes  weakness: No  confusion: No  dysphoric mood: Yes

## 2023-11-08 ENCOUNTER — PATIENT MESSAGE (OUTPATIENT)
Dept: INTERNAL MEDICINE | Facility: CLINIC | Age: 53
End: 2023-11-08
Payer: COMMERCIAL

## 2023-11-08 DIAGNOSIS — I10 HYPERTENSION, UNSPECIFIED TYPE: ICD-10-CM

## 2023-11-08 LAB — VIT B12 SERPL-MCNC: >1400 NG/L (ref 180–914)

## 2023-11-08 RX ORDER — LOSARTAN POTASSIUM 25 MG/1
50 TABLET ORAL DAILY
Qty: 90 TABLET | Refills: 0 | Status: SHIPPED | OUTPATIENT
Start: 2023-11-08 | End: 2024-01-16

## 2023-11-11 DIAGNOSIS — F32.A ANXIETY AND DEPRESSION: ICD-10-CM

## 2023-11-11 DIAGNOSIS — F41.9 ANXIETY AND DEPRESSION: ICD-10-CM

## 2023-11-14 RX ORDER — SERTRALINE HYDROCHLORIDE 100 MG/1
TABLET, FILM COATED ORAL
Qty: 30 TABLET | Refills: 0 | Status: SHIPPED | OUTPATIENT
Start: 2023-11-14 | End: 2023-12-18

## 2023-11-14 NOTE — TELEPHONE ENCOUNTER
Refill Routing Note   Medication(s) are not appropriate for processing by [facility] for the following reason(s):      Non-participating provider    [facility] action(s):  Route Care Due:  None identified          Appointments  past 12m or future 3m with PCP    Date Provider   Last Visit   11/7/2023 Caitlin King NP   Next Visit   Visit date not found Caitlin King NP   ED visits in past 90 days: 0        Note composed:5:09 AM 11/14/2023

## 2023-11-16 ENCOUNTER — LAB VISIT (OUTPATIENT)
Dept: LAB | Facility: HOSPITAL | Age: 53
End: 2023-11-16
Attending: NURSE PRACTITIONER
Payer: COMMERCIAL

## 2023-11-16 DIAGNOSIS — I10 HYPERTENSION, UNSPECIFIED TYPE: ICD-10-CM

## 2023-11-16 LAB
ANION GAP SERPL CALC-SCNC: 7 MMOL/L (ref 8–16)
BUN SERPL-MCNC: 11 MG/DL (ref 6–20)
CALCIUM SERPL-MCNC: 8.7 MG/DL (ref 8.7–10.5)
CHLORIDE SERPL-SCNC: 106 MMOL/L (ref 95–110)
CO2 SERPL-SCNC: 27 MMOL/L (ref 23–29)
CREAT SERPL-MCNC: 0.7 MG/DL (ref 0.5–1.4)
EST. GFR  (NO RACE VARIABLE): >60 ML/MIN/1.73 M^2
GLUCOSE SERPL-MCNC: 94 MG/DL (ref 70–110)
POTASSIUM SERPL-SCNC: 4.3 MMOL/L (ref 3.5–5.1)
SODIUM SERPL-SCNC: 140 MMOL/L (ref 136–145)

## 2023-11-16 PROCEDURE — 80048 BASIC METABOLIC PNL TOTAL CA: CPT | Performed by: NURSE PRACTITIONER

## 2023-11-16 PROCEDURE — 36415 COLL VENOUS BLD VENIPUNCTURE: CPT | Performed by: NURSE PRACTITIONER

## 2023-12-18 DIAGNOSIS — F32.A ANXIETY AND DEPRESSION: ICD-10-CM

## 2023-12-18 DIAGNOSIS — F41.9 ANXIETY AND DEPRESSION: ICD-10-CM

## 2023-12-18 RX ORDER — SERTRALINE HYDROCHLORIDE 100 MG/1
TABLET, FILM COATED ORAL
Qty: 30 TABLET | Refills: 0 | Status: SHIPPED | OUTPATIENT
Start: 2023-12-18 | End: 2024-01-16

## 2024-01-15 DIAGNOSIS — I10 HYPERTENSION, UNSPECIFIED TYPE: ICD-10-CM

## 2024-01-15 DIAGNOSIS — F41.9 ANXIETY AND DEPRESSION: ICD-10-CM

## 2024-01-15 DIAGNOSIS — F32.A ANXIETY AND DEPRESSION: ICD-10-CM

## 2024-01-16 RX ORDER — SERTRALINE HYDROCHLORIDE 100 MG/1
TABLET, FILM COATED ORAL
Qty: 30 TABLET | Refills: 0 | Status: SHIPPED | OUTPATIENT
Start: 2024-01-16 | End: 2024-02-15

## 2024-01-16 RX ORDER — LOSARTAN POTASSIUM 25 MG/1
25 TABLET ORAL
Qty: 90 TABLET | Refills: 0 | Status: SHIPPED | OUTPATIENT
Start: 2024-01-16 | End: 2024-02-15

## 2024-01-16 NOTE — TELEPHONE ENCOUNTER
Refill Routing Note   Medication(s) are not appropriate for processing by [facility] for the following reason(s):        Non-participating provider    [facility] action(s):  Route               Appointments  past 12m or future 3m with PCP    Date Provider   Last Visit   11/7/2023 Caitlin King NP   Next Visit   Visit date not found Caitlin King NP   ED visits in past 90 days: 0        Note composed:10:01 AM 01/16/2024

## 2024-02-06 ENCOUNTER — OFFICE VISIT (OUTPATIENT)
Dept: OBSTETRICS AND GYNECOLOGY | Facility: CLINIC | Age: 54
End: 2024-02-06
Payer: COMMERCIAL

## 2024-02-06 ENCOUNTER — HOSPITAL ENCOUNTER (OUTPATIENT)
Dept: RADIOLOGY | Facility: HOSPITAL | Age: 54
Discharge: HOME | End: 2024-02-06
Payer: COMMERCIAL

## 2024-02-06 VITALS
HEIGHT: 63 IN | BODY MASS INDEX: 29.95 KG/M2 | SYSTOLIC BLOOD PRESSURE: 142 MMHG | WEIGHT: 169 LBS | DIASTOLIC BLOOD PRESSURE: 78 MMHG | BODY MASS INDEX: 29.68 KG/M2 | WEIGHT: 167.56 LBS

## 2024-02-06 DIAGNOSIS — Z11.51 ENCOUNTER FOR SCREENING FOR HUMAN PAPILLOMAVIRUS (HPV): ICD-10-CM

## 2024-02-06 DIAGNOSIS — Z12.4 ENCOUNTER FOR PAPANICOLAOU SMEAR FOR CERVICAL CANCER SCREENING: ICD-10-CM

## 2024-02-06 DIAGNOSIS — Z01.419 ENCOUNTER FOR WELL WOMAN EXAM WITH ROUTINE GYNECOLOGICAL EXAM: Primary | ICD-10-CM

## 2024-02-06 DIAGNOSIS — Z12.39 ENCOUNTER FOR SCREENING FOR MALIGNANT NEOPLASM OF BREAST, UNSPECIFIED SCREENING MODALITY: ICD-10-CM

## 2024-02-06 PROCEDURE — 3078F DIAST BP <80 MM HG: CPT | Mod: CPTII,S$GLB,,

## 2024-02-06 PROCEDURE — 99999 PR PBB SHADOW E&M-EST. PATIENT-LVL III: CPT | Mod: PBBFAC,,,

## 2024-02-06 PROCEDURE — 4010F ACE/ARB THERAPY RXD/TAKEN: CPT | Mod: CPTII,S$GLB,,

## 2024-02-06 PROCEDURE — 88142 CYTOPATH C/V THIN LAYER: CPT

## 2024-02-06 PROCEDURE — 99396 PREV VISIT EST AGE 40-64: CPT | Mod: S$GLB,,,

## 2024-02-06 PROCEDURE — 1159F MED LIST DOCD IN RCRD: CPT | Mod: CPTII,S$GLB,,

## 2024-02-06 PROCEDURE — 3077F SYST BP >= 140 MM HG: CPT | Mod: CPTII,S$GLB,,

## 2024-02-06 PROCEDURE — 77067 SCR MAMMO BI INCL CAD: CPT | Mod: TC

## 2024-02-06 PROCEDURE — 77063 BREAST TOMOSYNTHESIS BI: CPT | Mod: 26,,, | Performed by: RADIOLOGY

## 2024-02-06 PROCEDURE — 87624 HPV HI-RISK TYP POOLED RSLT: CPT

## 2024-02-06 PROCEDURE — 3008F BODY MASS INDEX DOCD: CPT | Mod: CPTII,S$GLB,,

## 2024-02-06 PROCEDURE — 77067 SCR MAMMO BI INCL CAD: CPT | Mod: 26,,, | Performed by: RADIOLOGY

## 2024-02-06 NOTE — PROGRESS NOTES
Chief Complaint: Well Woman Exam     HPI:      Krystle Austin is a 53 y.o.  who presents today for well woman exam.  LMP: No LMP recorded. Patient has had a hysterectomy.  Doing well on estrogen and progesterone.  Stopped testosterone cream sec to unwanted hair growth but libido great! No issues, problems, or complaints. Specifically, patient denies abnormal vaginal bleeding, discharge, pelvic pain, urinary problems, or changes in appetite. Ms. Austin is currently sexually active with a single male partner. She declines STD screening today.    Previous Pap: Negative per patient  Previous Mammogram: BiRads: 1 T-C Score: 4.09% (2023) - had mammo done today  Most Recent Dexa: N/A  Most Recent Colonoscopy: WNL (2023), Repeat in     STD/STI Hx: Denies any history of STD's  Tobacco use:  Never  Alcohol use:  Yes - occasional/socially  Exercise regimen:  Yes - elliptical machine, treadmill, weights   Employment: Yes    FH:  Breast cancer: Cousin  Colon cancer: none  Ovarian cancer: none  Endometrial cancer: none    OB History          2    Para   2    Term   2            AB        Living   2         SAB        IAB        Ectopic        Multiple        Live Births                     ROS:     GENERAL: Denies unintentional weight gain or weight loss. Feeling well overall.   SKIN: Denies rash or lesions.   HEENT: Denies headaches, or vision changes.   CARDIOVASCULAR: Denies palpitations or chest pain.   RESPIRATORY: Denies shortness of breath or dyspnea on exertion.  BREASTS: Denies lumps or nipple discharge.   ABDOMEN: Denies constipation, diarrhea, nausea, vomiting, change in appetite.  URINARY: Denies frequency, dysuria.  NEUROLOGIC: Denies syncope or weakness.   PSYCHIATRIC: Denies uncontrolled depression or anxiety.    Physical Exam:      PHYSICAL EXAM:  BP (!) 142/78   Wt 76 kg (167 lb 8.8 oz)   BMI 29.68 kg/m²   Body mass index is 29.68 kg/m².     APPEARANCE: Well nourished, well  developed, in no acute distress.  PSYCH: Appropriate mood and affect.  SKIN: No acne or hirsutism  NECK: Neck symmetric without masses  NODES: No inguinal, axillary, or supraclavicular lymph node enlargement  ABDOMEN: Soft.  No tenderness or masses.    CARDIOVASCULAR: No edema of peripheral extremities  BREASTS: Symmetrical, no visible skin lesions. No palpable masses. No nipple discharge bilaterally.  PELVIC: Normal external genitalia without lesions.  Normal hair distribution.  Adequate perineal body, normal urethral meatus.  Vagina moist and well rugated. Without lesions. Vagina without discharge.  Cervix pink, without lesions, discharge or tenderness.  No significant cystocele or rectocele.  Bimanual exam shows no midline or adnexal masses.      Assessment/Plan:     Encounter for well woman exam with routine gynecological exam  -     Counseled patient regarding healthy diet and regular exercise, daily multivitamin, daily seat belt use.   -     She denies abuse and feels safe at home.  -     Pap smear:  Performed  -     MMG:  Had imaging done today, results pending   -     Colonoscopy: UTD    -     DEXA screening:  no indication due to age (<64y/o) and no additional risk factors e.g. previous fragility facture, glucocorticoid use, parental history of hip fracture, low body weight (<127 lbs), current cigarette use, excessive alcohol consumption, rheumatoid arthritis, secondary osteoporosis (malabsorption, inflammatory bowel disease, chronic liver disease).    Encounter for screening for human papillomavirus (HPV)  -     HPV High Risk Genotypes, PCR    Encounter for Papanicolaou smear for cervical cancer screening  -     Liquid-Based Pap Smear, Screening    Follow up in about 1 year for annual exam or sooner PRN.    Counseling:     Patient was counseled today on current ASCCP pap guidelines, the recommendation for yearly physical exams, healthy diet and exercise routines, safe driving habits, and breast self  awareness and annual mammograms. She is to see her PCP for other health maintenance.     Use of the [facility] Portal discussed and encouraged during today's visit.   Counseling time: 15 minutes    Mariely Lauren DNP (Maggie)  Obstetrics and Gynecology  [facility]

## 2024-02-08 LAB
HPV HR 12 DNA SPEC QL NAA+PROBE: NEGATIVE
HPV16 AG SPEC QL: NEGATIVE
HPV18 DNA SPEC QL NAA+PROBE: NEGATIVE

## 2024-02-12 LAB
FINAL PATHOLOGIC DIAGNOSIS: NORMAL
Lab: NORMAL

## 2024-02-15 DIAGNOSIS — F32.A ANXIETY AND DEPRESSION: ICD-10-CM

## 2024-02-15 DIAGNOSIS — I10 HYPERTENSION, UNSPECIFIED TYPE: ICD-10-CM

## 2024-02-15 DIAGNOSIS — F41.9 ANXIETY AND DEPRESSION: ICD-10-CM

## 2024-02-15 RX ORDER — LOSARTAN POTASSIUM 25 MG/1
25 TABLET ORAL
Qty: 90 TABLET | Refills: 1 | Status: SHIPPED | OUTPATIENT
Start: 2024-02-15

## 2024-02-15 RX ORDER — SERTRALINE HYDROCHLORIDE 100 MG/1
TABLET, FILM COATED ORAL
Qty: 30 TABLET | Refills: 3 | Status: SHIPPED | OUTPATIENT
Start: 2024-02-15 | End: 2024-05-16

## 2024-05-16 DIAGNOSIS — F32.A ANXIETY AND DEPRESSION: ICD-10-CM

## 2024-05-16 DIAGNOSIS — F41.9 ANXIETY AND DEPRESSION: ICD-10-CM

## 2024-05-16 RX ORDER — SERTRALINE HYDROCHLORIDE 100 MG/1
TABLET, FILM COATED ORAL
Qty: 30 TABLET | Refills: 3 | Status: SHIPPED | OUTPATIENT
Start: 2024-05-16

## 2024-05-24 ENCOUNTER — OFFICE VISIT (OUTPATIENT)
Dept: DERMATOLOGY | Facility: CLINIC | Age: 54
End: 2024-05-24
Payer: COMMERCIAL

## 2024-05-24 DIAGNOSIS — L57.0 ACTINIC KERATOSIS: ICD-10-CM

## 2024-05-24 DIAGNOSIS — L70.9 ACNE, UNSPECIFIED ACNE TYPE: Primary | ICD-10-CM

## 2024-05-24 DIAGNOSIS — D22.9 MULTIPLE BENIGN NEVI: ICD-10-CM

## 2024-05-24 PROCEDURE — 1160F RVW MEDS BY RX/DR IN RCRD: CPT | Mod: CPTII,S$GLB,, | Performed by: STUDENT IN AN ORGANIZED HEALTH CARE EDUCATION/TRAINING PROGRAM

## 2024-05-24 PROCEDURE — 99999 PR PBB SHADOW E&M-EST. PATIENT-LVL III: CPT | Mod: PBBFAC,,, | Performed by: STUDENT IN AN ORGANIZED HEALTH CARE EDUCATION/TRAINING PROGRAM

## 2024-05-24 PROCEDURE — 99202 OFFICE O/P NEW SF 15 MIN: CPT | Mod: 25,S$GLB,, | Performed by: STUDENT IN AN ORGANIZED HEALTH CARE EDUCATION/TRAINING PROGRAM

## 2024-05-24 PROCEDURE — 11900 INJECT SKIN LESIONS </W 7: CPT | Mod: XS,S$GLB,, | Performed by: STUDENT IN AN ORGANIZED HEALTH CARE EDUCATION/TRAINING PROGRAM

## 2024-05-24 PROCEDURE — 17000 DESTRUCT PREMALG LESION: CPT | Mod: S$GLB,,, | Performed by: STUDENT IN AN ORGANIZED HEALTH CARE EDUCATION/TRAINING PROGRAM

## 2024-05-24 PROCEDURE — 1159F MED LIST DOCD IN RCRD: CPT | Mod: CPTII,S$GLB,, | Performed by: STUDENT IN AN ORGANIZED HEALTH CARE EDUCATION/TRAINING PROGRAM

## 2024-05-24 PROCEDURE — 4010F ACE/ARB THERAPY RXD/TAKEN: CPT | Mod: CPTII,S$GLB,, | Performed by: STUDENT IN AN ORGANIZED HEALTH CARE EDUCATION/TRAINING PROGRAM

## 2024-05-24 NOTE — PROGRESS NOTES
Subjective:      Patient ID:  Krystle Austin is a 53 y.o. female who presents for   Chief Complaint   Patient presents with    Mole     forehead     Pt presents for mole, forehead, x entire life, recently became tender  No h/o nmsc or mm      Has had a mole on her brow her entire life. For about 1 week it has gotten red, swollen and tender    Review of Systems   Skin:  Positive for daily sunscreen use and activity-related sunscreen use.       Objective:   Physical Exam   Constitutional: She appears well-developed and well-nourished. No distress.   Neurological: She is alert and oriented to person, place, and time. She is not disoriented.   Psychiatric: She has a normal mood and affect.   Skin:   Areas Examined (abnormalities noted in diagram):   Head / Face Inspection Performed            Diagram Legend     Erythematous scaling macule/papule c/w actinic keratosis       Vascular papule c/w angioma      Pigmented verrucoid papule/plaque c/w seborrheic keratosis      Yellow umbilicated papule c/w sebaceous hyperplasia      Irregularly shaped tan macule c/w lentigo     1-2 mm smooth white papules consistent with Milia      Movable subcutaneous cyst with punctum c/w epidermal inclusion cyst      Subcutaneous movable cyst c/w pilar cyst      Firm pink to brown papule c/w dermatofibroma      Pedunculated fleshy papule(s) c/w skin tag(s)      Evenly pigmented macule c/w junctional nevus     Mildly variegated pigmented, slightly irregular-bordered macule c/w mildly atypical nevus      Flesh colored to evenly pigmented papule c/w intradermal nevus       Pink pearly papule/plaque c/w basal cell carcinoma      Erythematous hyperkeratotic cursted plaque c/w SCC      Surgical scar with no sign of skin cancer recurrence      Open and closed comedones      Inflammatory papules and pustules      Verrucoid papule consistent consistent with wart     Erythematous eczematous patches and plaques     Dystrophic onycholytic nail with  subungual debris c/w onychomycosis     Umbilicated papule    Erythematous-base heme-crusted tan verrucoid plaque consistent with inflamed seborrheic keratosis     Erythematous Silvery Scaling Plaque c/w Psoriasis     See annotation      Assessment / Plan:        Acne, unspecified acne type  -     triamcinolone acetonide injection 10 mg  -     OK [id number] ACETONIDE INJ PER 10MG  -     OK INJECTION INTO SKIN LESIONS, UP TO 7    1 week history of a red, tender papule superimposed on IDN which has been present and unchanged for many years. Likely ingrown hair or acne lesion superimposed on IDN vs inflamed / traumatized IDN. Discussed observation vs ILK. Patient preferred ILK    Intralesional Kenalog 2.5mg/cc (0.05 cc total) injected into 1 lesions on the forehead today after obtaining verbal consent including risk of surrounding hypopigmentation. Patient tolerated procedure well.    Units: 1  NDC for Kenalog 10mg/cc:  8840-1554-27    Actinic keratosis  Cryosurgery Procedure Note    Verbal consent from the patient is obtained including, but not limited to, risk of hypopigmentation/hyperpigmentation, scar, recurrence of lesion. The patient is aware of the precancerous quality and need for treatment of these lesions. Liquid nitrogen cryosurgery is applied to the 1 actinic keratoses, as detailed in the physical exam, to produce a freeze injury. The patient is aware that blisters may form and is instructed on wound care with gentle cleansing and use of vaseline ointment to keep moist until healed. The patient is supplied a handout on cryosurgery and is instructed to call if lesions do not completely resolve.    Multiple benign nevi  Reassurance given to patient. No treatment is necessary.          Follow up in about 1 year (around 5/24/2025) for UBSE.

## 2024-05-24 NOTE — PATIENT INSTRUCTIONS

## 2024-06-24 ENCOUNTER — OFFICE VISIT (OUTPATIENT)
Dept: DERMATOLOGY | Facility: CLINIC | Age: 54
End: 2024-06-24
Payer: COMMERCIAL

## 2024-06-24 DIAGNOSIS — D48.5 NEOPLASM OF UNCERTAIN BEHAVIOR OF SKIN: Primary | ICD-10-CM

## 2024-06-24 DIAGNOSIS — L82.0 SEBORRHEIC KERATOSES, INFLAMED: ICD-10-CM

## 2024-06-24 PROCEDURE — 88305 TISSUE EXAM BY PATHOLOGIST: CPT | Mod: 26,,, | Performed by: DERMATOLOGY

## 2024-06-24 PROCEDURE — 1160F RVW MEDS BY RX/DR IN RCRD: CPT | Mod: CPTII,S$GLB,, | Performed by: STUDENT IN AN ORGANIZED HEALTH CARE EDUCATION/TRAINING PROGRAM

## 2024-06-24 PROCEDURE — 1159F MED LIST DOCD IN RCRD: CPT | Mod: CPTII,S$GLB,, | Performed by: STUDENT IN AN ORGANIZED HEALTH CARE EDUCATION/TRAINING PROGRAM

## 2024-06-24 PROCEDURE — 99212 OFFICE O/P EST SF 10 MIN: CPT | Mod: 25,S$GLB,, | Performed by: STUDENT IN AN ORGANIZED HEALTH CARE EDUCATION/TRAINING PROGRAM

## 2024-06-24 PROCEDURE — 11102 TANGNTL BX SKIN SINGLE LES: CPT | Mod: S$GLB,,, | Performed by: STUDENT IN AN ORGANIZED HEALTH CARE EDUCATION/TRAINING PROGRAM

## 2024-06-24 PROCEDURE — 88305 TISSUE EXAM BY PATHOLOGIST: CPT | Performed by: DERMATOLOGY

## 2024-06-24 PROCEDURE — 4010F ACE/ARB THERAPY RXD/TAKEN: CPT | Mod: CPTII,S$GLB,, | Performed by: STUDENT IN AN ORGANIZED HEALTH CARE EDUCATION/TRAINING PROGRAM

## 2024-06-24 PROCEDURE — 99999 PR PBB SHADOW E&M-EST. PATIENT-LVL III: CPT | Mod: PBBFAC,,, | Performed by: STUDENT IN AN ORGANIZED HEALTH CARE EDUCATION/TRAINING PROGRAM

## 2024-06-24 NOTE — PATIENT INSTRUCTIONS
Shave Biopsy Wound Care    Your doctor has performed a shave biopsy today.  A band aid and vaseline ointment has been placed over the site.  This should remain in place for NO LONGER THAN 48 hours.  It is fine to remove the bandaid after 24 hours, if the area is no longer bleeding. It is recommended that you keep the area dry (do not wet)) for the first 24 hours.  After 24 hours, wash the area with warm soap and water and apply Vaseline jelly.  Many patients prefer to use Neosporin or Bacitracin ointment.  This is acceptable; however, know that you can develop an allergy to this medication even if you have used it safely for years.  It is important to keep the area moist.  Letting it dry out and get air slows healing time, and will worsen the scar.        If you notice increasing redness, tenderness, pain, or yellow drainage at the biopsy site, please notify your doctor.  These are signs of an infection.    If your biopsy site is bleeding, apply firm pressure for 15 minutes straight.  Repeat for another 15 minutes, if it is still bleeding.   If the surgical site continues to bleed, then please contact your doctor.      For [facility] users:   You will receive your biopsy results in [facility] as soon as they are available. Please be assured that your physician/provider will review your results and will then determine what further treatment, evaluation, or planning is required. You should be contacted by your physician's/provider's office within 5 business days of receiving your results; If not, please reach out to directly. This is one more way [employer] is putting you first.     [facility] [place] [address]/ [phone] FAX/ [email or web address]

## 2024-06-24 NOTE — PROGRESS NOTES
Subjective:      Patient ID:  Krystle Austin is a 53 y.o. female who presents for   Chief Complaint   Patient presents with    Follow-up     Pt presents for mole, forehead, x entire life, recently became tender    Pt last seen on 5/24/2024 for ILK injection into mole on forehead   Pt reports injection helped w/ swelling     No h/o nmsc or mm    Follow-up      Seen for inflamed nevus on right forehead 5/2024. Treated with ILK. Redness and swelling have gone down but     Review of Systems   Skin:  Positive for daily sunscreen use and activity-related sunscreen use.       Objective:   Physical Exam   Constitutional: She appears well-developed and well-nourished. No distress.   Neurological: She is alert and oriented to person, place, and time. She is not disoriented.   Psychiatric: She has a normal mood and affect.   Skin:   Areas Examined (abnormalities noted in diagram):   Head / Face Inspection Performed                 Diagram Legend     Erythematous scaling macule/papule c/w actinic keratosis       Vascular papule c/w angioma      Pigmented verrucoid papule/plaque c/w seborrheic keratosis      Yellow umbilicated papule c/w sebaceous hyperplasia      Irregularly shaped tan macule c/w lentigo     1-2 mm smooth white papules consistent with Milia      Movable subcutaneous cyst with punctum c/w epidermal inclusion cyst      Subcutaneous movable cyst c/w pilar cyst      Firm pink to brown papule c/w dermatofibroma      Pedunculated fleshy papule(s) c/w skin tag(s)      Evenly pigmented macule c/w junctional nevus     Mildly variegated pigmented, slightly irregular-bordered macule c/w mildly atypical nevus      Flesh colored to evenly pigmented papule c/w intradermal nevus       Pink pearly papule/plaque c/w basal cell carcinoma      Erythematous hyperkeratotic cursted plaque c/w SCC      Surgical scar with no sign of skin cancer recurrence      Open and closed comedones      Inflammatory papules and  pustules      Verrucoid papule consistent consistent with wart     Erythematous eczematous patches and plaques     Dystrophic onycholytic nail with subungual debris c/w onychomycosis     Umbilicated papule    Erythematous-base heme-crusted tan verrucoid plaque consistent with inflamed seborrheic keratosis     Erythematous Silvery Scaling Plaque c/w Psoriasis     See annotation            Assessment / Plan:      Pathology Orders:       Normal Orders This Visit    Specimen to Pathology, Dermatology     Questions:    Procedure Type: Dermatology and skin neoplasms    Number of Specimens: 1    ------------------------: -------------------------    Spec 1 Procedure: Biopsy    Spec 1 Clinical Impression: inflamed nevus vs other    Spec 1 Source: right forehead    Release to patient: Immediate    Release to patient:           Neoplasm of uncertain behavior of skin  -     Specimen to Pathology, Dermatology    Counseled on risk of nevus recurrence or repigmentation    Shave biopsy procedure note:    Shave biopsy performed after verbal consent including risk of infection, scar, recurrence, need for additional treatment of site. Area prepped with alcohol, anesthetized with approximately 1.0cc of 1% lidocaine with epinephrine. Lesional tissue shaved with razor blade. Hemostasis achieved with application of aluminum chloride followed by hyfrecation. No complications. Dressing applied. Wound care explained.      Seborrheic keratoses, inflamed  Reassurance given to patient. No treatment is necessary.              Follow up if symptoms worsen or fail to improve.

## 2024-06-26 LAB
FINAL PATHOLOGIC DIAGNOSIS: NORMAL
GROSS: NORMAL
Lab: NORMAL
MICROSCOPIC EXAM: NORMAL

## 2024-07-08 ENCOUNTER — PATIENT MESSAGE (OUTPATIENT)
Dept: INTERNAL MEDICINE | Facility: CLINIC | Age: 54
End: 2024-07-08
Payer: COMMERCIAL

## 2024-07-08 DIAGNOSIS — I70.90 ATHEROSCLEROSIS: Primary | ICD-10-CM

## 2024-07-09 RX ORDER — ATORVASTATIN CALCIUM 10 MG/1
10 TABLET, FILM COATED ORAL DAILY
Qty: 90 TABLET | Refills: 3 | Status: SHIPPED | OUTPATIENT
Start: 2024-07-09 | End: 2025-07-09

## 2024-07-15 DIAGNOSIS — I10 HYPERTENSION, UNSPECIFIED TYPE: ICD-10-CM

## 2024-07-15 RX ORDER — LOSARTAN POTASSIUM 25 MG/1
25 TABLET ORAL
Qty: 90 TABLET | Refills: 1 | Status: SHIPPED | OUTPATIENT
Start: 2024-07-15

## 2024-07-23 ENCOUNTER — PATIENT MESSAGE (OUTPATIENT)
Dept: INTERNAL MEDICINE | Facility: CLINIC | Age: 54
End: 2024-07-23
Payer: COMMERCIAL

## 2024-07-23 DIAGNOSIS — T75.3XXA MOTION SICKNESS, INITIAL ENCOUNTER: Primary | ICD-10-CM

## 2024-07-24 RX ORDER — SCOLOPAMINE TRANSDERMAL SYSTEM 1 MG/1
1 PATCH, EXTENDED RELEASE TRANSDERMAL
Qty: 4 PATCH | Refills: 0 | Status: SHIPPED | OUTPATIENT
Start: 2024-07-24

## 2024-10-07 DIAGNOSIS — I10 HYPERTENSION, UNSPECIFIED TYPE: ICD-10-CM

## 2024-10-07 RX ORDER — PROPRANOLOL HYDROCHLORIDE 10 MG/1
10 TABLET ORAL
Qty: 90 TABLET | Refills: 0 | Status: SHIPPED | OUTPATIENT
Start: 2024-10-07

## 2024-10-21 DIAGNOSIS — F41.9 ANXIETY AND DEPRESSION: ICD-10-CM

## 2024-10-21 DIAGNOSIS — F32.A ANXIETY AND DEPRESSION: ICD-10-CM

## 2024-10-21 RX ORDER — SERTRALINE HYDROCHLORIDE 100 MG/1
TABLET, FILM COATED ORAL
Qty: 30 TABLET | Refills: 0 | Status: SHIPPED | OUTPATIENT
Start: 2024-10-21

## 2024-10-21 RX ORDER — PROGESTERONE 200 MG/1
200 CAPSULE ORAL NIGHTLY
Qty: 90 CAPSULE | Refills: 0 | Status: SHIPPED | OUTPATIENT
Start: 2024-10-21 | End: 2025-10-21

## 2024-10-21 RX ORDER — ESTRADIOL 2 MG/1
2 TABLET ORAL DAILY
Qty: 90 TABLET | Refills: 0 | Status: SHIPPED | OUTPATIENT
Start: 2024-10-21 | End: 2025-10-21

## 2024-10-24 ENCOUNTER — PATIENT MESSAGE (OUTPATIENT)
Dept: INTERNAL MEDICINE | Facility: CLINIC | Age: 54
End: 2024-10-24
Payer: COMMERCIAL

## 2024-10-25 RX ORDER — SUMATRIPTAN SUCCINATE 25 MG/1
25 TABLET ORAL
Qty: 8 TABLET | Refills: 0 | Status: SHIPPED | OUTPATIENT
Start: 2024-10-25 | End: 2024-11-24

## 2024-11-08 ENCOUNTER — LAB VISIT (OUTPATIENT)
Dept: LAB | Facility: HOSPITAL | Age: 54
End: 2024-11-08
Payer: COMMERCIAL

## 2024-11-08 ENCOUNTER — OFFICE VISIT (OUTPATIENT)
Dept: INTERNAL MEDICINE | Facility: CLINIC | Age: 54
End: 2024-11-08
Payer: COMMERCIAL

## 2024-11-08 VITALS
HEIGHT: 63 IN | HEART RATE: 86 BPM | WEIGHT: 159.19 LBS | OXYGEN SATURATION: 96 % | SYSTOLIC BLOOD PRESSURE: 110 MMHG | BODY MASS INDEX: 28.21 KG/M2 | DIASTOLIC BLOOD PRESSURE: 60 MMHG

## 2024-11-08 DIAGNOSIS — F41.9 ANXIETY AND DEPRESSION: ICD-10-CM

## 2024-11-08 DIAGNOSIS — I10 HYPERTENSION, UNSPECIFIED TYPE: ICD-10-CM

## 2024-11-08 DIAGNOSIS — F32.A ANXIETY AND DEPRESSION: ICD-10-CM

## 2024-11-08 DIAGNOSIS — J01.90 ACUTE BACTERIAL SINUSITIS: ICD-10-CM

## 2024-11-08 DIAGNOSIS — I70.90 ATHEROSCLEROSIS: ICD-10-CM

## 2024-11-08 DIAGNOSIS — Z00.00 ANNUAL PHYSICAL EXAM: ICD-10-CM

## 2024-11-08 DIAGNOSIS — E66.3 OVERWEIGHT WITH BODY MASS INDEX (BMI) 25.0-29.9: ICD-10-CM

## 2024-11-08 DIAGNOSIS — B96.89 ACUTE BACTERIAL SINUSITIS: ICD-10-CM

## 2024-11-08 DIAGNOSIS — Z00.00 ANNUAL PHYSICAL EXAM: Primary | ICD-10-CM

## 2024-11-08 LAB
25(OH)D3+25(OH)D2 SERPL-MCNC: 44 NG/ML (ref 30–96)
ALBUMIN SERPL BCP-MCNC: 4.1 G/DL (ref 3.5–5.2)
ALP SERPL-CCNC: 71 U/L (ref 40–150)
ALT SERPL W/O P-5'-P-CCNC: 18 U/L (ref 10–44)
ANION GAP SERPL CALC-SCNC: 10 MMOL/L (ref 8–16)
AST SERPL-CCNC: 17 U/L (ref 10–40)
BASOPHILS # BLD AUTO: 0.03 K/UL (ref 0–0.2)
BASOPHILS NFR BLD: 0.4 % (ref 0–1.9)
BILIRUB SERPL-MCNC: 0.7 MG/DL (ref 0.1–1)
BUN SERPL-MCNC: 17 MG/DL (ref 6–20)
CALCIUM SERPL-MCNC: 9.2 MG/DL (ref 8.7–10.5)
CHLORIDE SERPL-SCNC: 103 MMOL/L (ref 95–110)
CHOLEST SERPL-MCNC: 167 MG/DL (ref 120–199)
CHOLEST/HDLC SERPL: 2.5 {RATIO} (ref 2–5)
CO2 SERPL-SCNC: 26 MMOL/L (ref 23–29)
CREAT SERPL-MCNC: 0.7 MG/DL (ref 0.5–1.4)
DIFFERENTIAL METHOD BLD: NORMAL
EOSINOPHIL # BLD AUTO: 0 K/UL (ref 0–0.5)
EOSINOPHIL NFR BLD: 0.4 % (ref 0–8)
ERYTHROCYTE [DISTWIDTH] IN BLOOD BY AUTOMATED COUNT: 12.8 % (ref 11.5–14.5)
EST. GFR  (NO RACE VARIABLE): >60 ML/MIN/1.73 M^2
ESTIMATED AVG GLUCOSE: 100 MG/DL (ref 68–131)
GLUCOSE SERPL-MCNC: 82 MG/DL (ref 70–110)
HBA1C MFR BLD: 5.1 % (ref 4–5.6)
HCT VFR BLD AUTO: 46.3 % (ref 37–48.5)
HDLC SERPL-MCNC: 68 MG/DL (ref 40–75)
HDLC SERPL: 40.7 % (ref 20–50)
HGB BLD-MCNC: 15.1 G/DL (ref 12–16)
IMM GRANULOCYTES # BLD AUTO: 0.02 K/UL (ref 0–0.04)
IMM GRANULOCYTES NFR BLD AUTO: 0.3 % (ref 0–0.5)
LDLC SERPL CALC-MCNC: 85.4 MG/DL (ref 63–159)
LYMPHOCYTES # BLD AUTO: 1.4 K/UL (ref 1–4.8)
LYMPHOCYTES NFR BLD: 21.5 % (ref 18–48)
MCH RBC QN AUTO: 28.9 PG (ref 27–31)
MCHC RBC AUTO-ENTMCNC: 32.6 G/DL (ref 32–36)
MCV RBC AUTO: 89 FL (ref 82–98)
MONOCYTES # BLD AUTO: 0.5 K/UL (ref 0.3–1)
MONOCYTES NFR BLD: 6.7 % (ref 4–15)
NEUTROPHILS # BLD AUTO: 4.7 K/UL (ref 1.8–7.7)
NEUTROPHILS NFR BLD: 70.7 % (ref 38–73)
NONHDLC SERPL-MCNC: 99 MG/DL
NRBC BLD-RTO: 0 /100 WBC
PLATELET # BLD AUTO: 334 K/UL (ref 150–450)
PMV BLD AUTO: 10.7 FL (ref 9.2–12.9)
POTASSIUM SERPL-SCNC: 3.9 MMOL/L (ref 3.5–5.1)
PROT SERPL-MCNC: 7.9 G/DL (ref 6–8.4)
RBC # BLD AUTO: 5.23 M/UL (ref 4–5.4)
SODIUM SERPL-SCNC: 139 MMOL/L (ref 136–145)
TRIGL SERPL-MCNC: 68 MG/DL (ref 30–150)
TSH SERPL DL<=0.005 MIU/L-ACNC: 0.69 UIU/ML (ref 0.4–4)
WBC # BLD AUTO: 6.69 K/UL (ref 3.9–12.7)

## 2024-11-08 PROCEDURE — 80053 COMPREHEN METABOLIC PANEL: CPT | Performed by: NURSE PRACTITIONER

## 2024-11-08 PROCEDURE — 36415 COLL VENOUS BLD VENIPUNCTURE: CPT | Performed by: NURSE PRACTITIONER

## 2024-11-08 PROCEDURE — 83036 HEMOGLOBIN GLYCOSYLATED A1C: CPT | Performed by: NURSE PRACTITIONER

## 2024-11-08 PROCEDURE — 80061 LIPID PANEL: CPT | Performed by: NURSE PRACTITIONER

## 2024-11-08 PROCEDURE — 82306 VITAMIN D 25 HYDROXY: CPT | Performed by: NURSE PRACTITIONER

## 2024-11-08 PROCEDURE — 99999 PR PBB SHADOW E&M-EST. PATIENT-LVL IV: CPT | Mod: PBBFAC,,, | Performed by: NURSE PRACTITIONER

## 2024-11-08 PROCEDURE — 85025 COMPLETE CBC W/AUTO DIFF WBC: CPT | Performed by: NURSE PRACTITIONER

## 2024-11-08 PROCEDURE — 84443 ASSAY THYROID STIM HORMONE: CPT | Performed by: NURSE PRACTITIONER

## 2024-11-08 RX ORDER — VIT C/E/ZN/COPPR/LUTEIN/ZEAXAN 250MG-90MG
500 CAPSULE ORAL DAILY
COMMUNITY

## 2024-11-08 RX ORDER — AMOXICILLIN AND CLAVULANATE POTASSIUM 875; 125 MG/1; MG/1
1 TABLET, FILM COATED ORAL EVERY 12 HOURS
Qty: 14 TABLET | Refills: 0 | Status: SHIPPED | OUTPATIENT
Start: 2024-11-08

## 2024-11-08 NOTE — PROGRESS NOTES
Internal Medicine Annual Exam       CHIEF COMPLAINT     The patient, Krystel Austin, who is a 53 y.o. female with HTN, Anxiety and depression, and h/o bunion s/p surgery  presents for an annual exam.    HPI     HTN- taking losartan 25mg and decreased propranolol to 10mg at last visit     HLD- taking Lipitor 10mg      Has lost 50 lbs on semaglutide and tirzepatitde continues to take 90 units of compounded mounjaro      Anxiety and depression- taking zoloft and inderal    Migraines- taking imitrex      Having sinus pain and pressure x 2 weeks -  taking mucinex otc        HM-  CRCS- 3/28/2023  Tdap-2015  Flu- 11/7/2023  MMG- 2/6/2024          Past Medical History:  Past Medical History:   Diagnosis Date    Endometriosis     Hypertension     PONV (postoperative nausea and vomiting)        Past Surgical History:   Procedure Laterality Date    BUNIONECTOMY Right 07/30/2020    Procedure: BUNIONECTOMY;  Surgeon: Rajinder Muir DPM;  [facility];  Service: Podiatry;  Laterality: Right;    COLONOSCOPY N/A 3/28/2023    Procedure: COLONOSCOPY;  Surgeon: Ervin Loya MD;  Location: [facility] ENDO (4TH FLR);  Service: Endoscopy;  Laterality: N/A;  instr portal-GT  pre call done 3/22 - mf    HYSTERECTOMY  05/2008    KNEE CARTILAGE SURGERY Left     MEGHANA BUNIONECTOMY Right 07/30/2020    Procedure: EXCISION, BUNIONETTE;  Surgeon: Rajinder Muir DPM;  Location: [facility] OR 2ND FLR;  Service: Podiatry;  Laterality: Right;    TONSILLECTOMY  All I can remember is I was in 4th grade.        Family History   Problem Relation Name Age of Onset    Cancer Mother [place]nette [place]         lung     COPD Mother [place] 61        lung     Miscarriages / Stillbirths Sister Melonie Valle     COPD Maternal Aunt Maryann Barnes     COPD Maternal Uncle Black Leonel     Alcohol abuse Maternal Grandmother Abigail Petty     Cancer Maternal Grandmother Abigail Petty         unsure - metastatic     Asthma Son Drake Austin      Learning disabilities Son Adam Austin     Breast cancer Cousin          Social History     Socioeconomic History    Marital status:    Tobacco Use    Smoking status: Never    Smokeless tobacco: Never   Substance and Sexual Activity    Alcohol use: Yes     Alcohol/week: 2.0 standard drinks of alcohol     Types: 2 Cans of beer per week     Comment: occasionally    Drug use: Never    Sexual activity: Yes     Partners: Male     Birth control/protection: None     Social Drivers of Health     Financial Resource Strain: Low Risk  (5/19/2024)    Overall Financial Resource Strain (CARDIA)     Difficulty of Paying Living Expenses: Not hard at all   Food Insecurity: No Food Insecurity (5/19/2024)    Hunger Vital Sign     Worried About Running Out of Food in the Last Year: Never true     Ran Out of Food in the Last Year: Never true   Transportation Needs: No Transportation Needs (11/5/2023)    PRAPARE - Transportation     Lack of Transportation (Medical): No     Lack of Transportation (Non-Medical): No   Physical Activity: Sufficiently Active (5/19/2024)    Exercise Vital Sign     Days of Exercise per Week: 7 days     Minutes of Exercise per Session: 60 min   Stress: No Stress Concern Present (5/19/2024)    [employer] - Occupational Stress Questionnaire     Feeling of Stress : Not at all   Housing Stability: Low Risk  (11/5/2023)    Housing Stability Vital Sign     Unable to Pay for Housing in the Last Year: No     Number of Places Lived in the Last Year: 1     Unstable Housing in the Last Year: No        Social History     Tobacco Use   Smoking Status Never   Smokeless Tobacco Never        Allergies as of 11/08/2024 - Reviewed 11/08/2024   Allergen Reaction Noted    Oxycodone-acetaminophen Hives 08/13/2019          Home Medications:  Prior to Admission medications    Medication Sig Start Date End Date Taking? Authorizing Provider   APPLE CIDER VINEGAR ORAL Take by mouth.   Yes Provider,  Historical   atorvastatin (LIPITOR) 10 MG tablet Take 1 tablet (10 mg total) by mouth once daily. 7/9/24 7/9/25 Yes Caitlin King NP   azelastine (ASTELIN) 137 mcg (0.1 %) nasal spray SMARTSIG:Both Nares 10/28/22  Yes Provider, Historical   calcium carbonate (OS-STEFANIE) 600 mg calcium (1,500 mg) Tab Take 600 mg by mouth once.   Yes Provider, Historical   cyanocobalamin (VITAMIN B-12) 500 MCG tablet Take 500 mcg by mouth once daily.   Yes Provider, Historical   estradioL (ESTRACE) 2 MG tablet Take 1 tablet (2 mg total) by mouth once daily. 10/21/24 10/21/25 Yes Mariely Lauren DNP   losartan (COZAAR) 25 MG tablet TAKE ONE TABLET BY MOUTH EVERY DAY 7/15/24  Yes Alissa Leon MD   multivit-min/iron/folic/[id number] (HAIR, SKIN AND NAILS ADVANCED ORAL) Take by mouth.   Yes Provider, Historical   multivitamin with minerals tablet Take 1 tablet by mouth once daily.   Yes Provider, Historical   naproxen (NAPROSYN) 500 MG tablet Take 500 mg by mouth 2 (two) times daily as needed. 1/10/23  Yes Provider, Historical   nystatin (MYCOSTATIN) powder Apply to affected area 3 times daily 9/20/23  Yes Natacha Lucero MD   progesterone (PROMETRIUM) 200 MG capsule Take 1 capsule (200 mg total) by mouth every evening. 10/21/24 10/21/25 Yes Mariely Lauren DNP   propranoloL (INDERAL) 10 MG tablet TAKE ONE TABLET BY MOUTH EVERY DAY 10/7/24  Yes Alissa Leon MD   sertraline (ZOLOFT) 100 MG tablet TAKE ONE TABLET BY MOUTH EVERY DAY 10/21/24  Yes Alissa Leon MD   sumatriptan (IMITREX) 25 MG Tab Take 1 tablet (25 mg total) by mouth every 2 (two) hours as needed (migraine). 10/25/24 11/24/24 Yes Caitlin King NP   valACYclovir (VALTREX) 1000 MG tablet TAKE ONE TABLET BY MOUTH TWICE DAILY FOR 7 DAYS 3/2/22  Yes Caitlin King NP   pantoprazole (PROTONIX) 40 MG tablet Take 1 tablet (40 mg total) by mouth once daily. 3/9/23 3/8/24  Caitlin King NP   scopolamine (TRANSDERM-SCOP) 1.3-1.5 mg (1 mg over 3  "days) Place 1 patch onto the skin every 72 hours.  Patient not taking: Reported on 11/8/2024 7/24/24   Caitlin King NP       Review of Systems:  Review of Systems   Constitutional:  Negative for activity change and unexpected weight change.   HENT:  Positive for rhinorrhea. Negative for hearing loss and trouble swallowing.    Eyes:  Negative for discharge and visual disturbance.   Respiratory:  Negative for chest tightness and wheezing.    Cardiovascular:  Negative for chest pain and palpitations.   Gastrointestinal:  Negative for blood in stool, constipation, diarrhea and vomiting.   Endocrine: Negative for polydipsia and polyuria.   Genitourinary:  Negative for difficulty urinating, dysuria, hematuria and menstrual problem.   Musculoskeletal:  Positive for neck pain. Negative for arthralgias and joint swelling.   Neurological:  Positive for headaches. Negative for weakness.   Psychiatric/Behavioral:  Negative for confusion and dysphoric mood.        Health Maintainence:   Immunizations:  Health Maintenance         Date Due Completion Date    Hepatitis C Screening Never done ---    HIV Screening Never done ---    Shingles Vaccine (1 of 2) Never done ---    COVID-19 Vaccine (4 - 2024-25 season) 09/01/2024 12/15/2021    Mammogram 02/06/2025 2/6/2024    Override on 7/9/2019: Done    TETANUS VACCINE 03/22/2025 3/22/2015    Hemoglobin A1c (Diabetic Prevention Screening) 11/07/2026 11/7/2023    Lipid Panel 11/07/2028 11/7/2023    Colorectal Cancer Screening 03/28/2030 3/28/2023    RSV Vaccine (Age 60+ and Pregnant patients) (1 - 1-dose 75+ series) 11/29/2046 ---             PHYSICAL EXAM     BP (!) 120/90 (BP Location: Left arm, Patient Position: Sitting)   Pulse 86   Ht 5' 3" (1.6 m)   Wt 72.2 kg (159 lb 2.8 oz)   SpO2 96%   BMI 28.20 kg/m²  Body mass index is 28.2 kg/m².    Physical Exam  Vitals reviewed.   Constitutional:       Appearance: She is well-developed.   HENT:      Head: Normocephalic.      " Right Ear: External ear normal.      Left Ear: External ear normal.      Nose:      Right Sinus: Maxillary sinus tenderness and frontal sinus tenderness present.      Left Sinus: Maxillary sinus tenderness and frontal sinus tenderness present.      Mouth/Throat:      Pharynx: No oropharyngeal exudate.   Eyes:      Pupils: Pupils are equal, round, and reactive to light.   Neck:      Thyroid: No thyromegaly.      Vascular: No JVD.      Trachea: No tracheal deviation.   Cardiovascular:      Rate and Rhythm: Normal rate and regular rhythm.      Heart sounds: Normal heart sounds. No murmur heard.     No friction rub. No gallop.   Pulmonary:      Effort: Pulmonary effort is normal. No respiratory distress.      Breath sounds: Normal breath sounds. No wheezing or rales.   Abdominal:      General: Bowel sounds are normal. There is no distension.      Palpations: Abdomen is soft.      Tenderness: There is no abdominal tenderness.   Musculoskeletal:         General: No tenderness. Normal range of motion.      Cervical back: Neck supple.   Lymphadenopathy:      Cervical: No cervical adenopathy.   Skin:     General: Skin is warm and dry.      Findings: No rash.   Neurological:      Mental Status: She is alert and oriented to person, place, and time.   Psychiatric:         Behavior: Behavior normal.         LABS     Lab Results   Component Value Date    HGBA1C 5.3 11/07/2023     CMP  Sodium   Date Value Ref Range Status   11/16/2023 140 136 - 145 mmol/L Final     Potassium   Date Value Ref Range Status   11/16/2023 4.3 3.5 - 5.1 mmol/L Final     Chloride   Date Value Ref Range Status   11/16/2023 106 95 - 110 mmol/L Final     CO2   Date Value Ref Range Status   11/16/2023 27 23 - 29 mmol/L Final     Glucose   Date Value Ref Range Status   11/16/2023 94 70 - 110 mg/dL Final     BUN   Date Value Ref Range Status   11/16/2023 11 6 - 20 mg/dL Final     Creatinine   Date Value Ref Range Status   11/16/2023 0.7 0.5 - 1.4 mg/dL Final      Calcium   Date Value Ref Range Status   11/16/2023 8.7 8.7 - 10.5 mg/dL Final     Total Protein   Date Value Ref Range Status   11/07/2023 8.0 6.0 - 8.4 g/dL Final     Albumin   Date Value Ref Range Status   11/07/2023 3.9 3.5 - 5.2 g/dL Final     Total Bilirubin   Date Value Ref Range Status   11/07/2023 0.6 0.1 - 1.0 mg/dL Final     Comment:     For infants and newborns, interpretation of results should be based  on gestational age, weight and in agreement with clinical  observations.    Premature Infant recommended reference ranges:  Up to 24 hours.............<8.0 mg/dL  Up to 48 hours............<12.0 mg/dL  3-5 days..................<15.0 mg/dL  6-29 days.................<15.0 mg/dL       Alkaline Phosphatase   Date Value Ref Range Status   11/07/2023 73 55 - 135 U/L Final     AST   Date Value Ref Range Status   11/07/2023 17 10 - 40 U/L Final     ALT   Date Value Ref Range Status   11/07/2023 13 10 - 44 U/L Final     Anion Gap   Date Value Ref Range Status   11/16/2023 7 (L) 8 - 16 mmol/L Final     eGFR if    Date Value Ref Range Status   10/26/2021 >60.0 >60 mL/min/1.73 m^2 Final     eGFR if non    Date Value Ref Range Status   10/26/2021 >60.0 >60 mL/min/1.73 m^2 Final     Comment:     Calculation used to obtain the estimated glomerular filtration  rate (eGFR) is the CKD-EPI equation.        Lab Results   Component Value Date    WBC 9.07 11/07/2023    HGB 14.7 11/07/2023    HCT 43.9 11/07/2023    MCV 88 11/07/2023     11/07/2023     Lab Results   Component Value Date    CHOL 226 (H) 11/07/2023    CHOL 166 11/11/2022    CHOL 162 10/26/2021     Lab Results   Component Value Date    HDL 56 11/07/2023    HDL 41 11/11/2022    HDL 47 10/26/2021     Lab Results   Component Value Date    LDLCALC 137.2 11/07/2023    LDLCALC 109.4 11/11/2022    LDLCALC 104.0 10/26/2021     Lab Results   Component Value Date    TRIG 164 (H) 11/07/2023    TRIG 78 11/11/2022    TRIG 55  10/26/2021     Lab Results   Component Value Date    CHOLHDL 24.8 11/07/2023    CHOLHDL 24.7 11/11/2022    CHOLHDL 29.0 10/26/2021     Lab Results   Component Value Date    TSH 0.702 11/07/2023       ASSESSMENT/PLAN     Krystle Austin is a 53 y.o. female    Annual physical exam- All age and gender related screenings discussed   -     CBC Auto Differential; Future; Expected date: 11/08/2024  -     Comprehensive Metabolic Panel; Future; Expected date: 11/08/2024  -     Hemoglobin A1C; Future; Expected date: 11/08/2024  -     Lipid Panel; Future; Expected date: 11/08/2024  -     TSH; Future; Expected date: 11/08/2024  -     Vitamin D; Future; Expected date: 11/08/2024    Anxiety and depression- stable. Will cont zoloft     Hypertension, unspecified type- stable. Will cont losartan   -     Comprehensive Metabolic Panel; Future; Expected date: 11/08/2024    Overweight with body mass index (BMI) 25.0-29.9  -     Lipid Panel; Future; Expected date: 11/08/2024  -     Vitamin D; Future; Expected date: 11/08/2024    Atherosclerosis- stable. Will cont lipitor and repeat FLP   -     Lipid Panel; Future; Expected date: 11/08/2024    Acute bacterial sinusitis  -     amoxicillin-clavulanate 875-125mg (AUGMENTIN) 875-125 mg per tablet; Take 1 tablet by mouth every 12 (twelve) hours.  Dispense: 14 tablet; Refill: 0           Follow up with PCP     Caitlin WALKER, APRN, FNP-c   Department of Internal Medicine - [facility]  8:37 AM

## 2024-11-16 DIAGNOSIS — F41.9 ANXIETY AND DEPRESSION: ICD-10-CM

## 2024-11-16 DIAGNOSIS — I10 HYPERTENSION, UNSPECIFIED TYPE: ICD-10-CM

## 2024-11-16 DIAGNOSIS — F32.A ANXIETY AND DEPRESSION: ICD-10-CM

## 2024-11-17 NOTE — TELEPHONE ENCOUNTER
Refill Routing Note   Medication(s) are not appropriate for processing by [facility] for the following reason(s):        Non-participating provider    [facility] action(s):  Route               Appointments  past 12m or future 3m with PCP    Date Provider   Last Visit   11/8/2024 Caitlin King NP   Next Visit   Visit date not found Caitlin King NP   ED visits in past 90 days: 0        Note composed:9:25 AM 11/17/2024

## 2024-11-18 RX ORDER — PROPRANOLOL HYDROCHLORIDE 10 MG/1
10 TABLET ORAL
Qty: 90 TABLET | Refills: 0 | Status: SHIPPED | OUTPATIENT
Start: 2024-11-18

## 2024-11-18 RX ORDER — SERTRALINE HYDROCHLORIDE 100 MG/1
TABLET, FILM COATED ORAL
Qty: 30 TABLET | Refills: 0 | Status: SHIPPED | OUTPATIENT
Start: 2024-11-18

## 2024-11-18 RX ORDER — LOSARTAN POTASSIUM 25 MG/1
25 TABLET ORAL
Qty: 90 TABLET | Refills: 0 | Status: SHIPPED | OUTPATIENT
Start: 2024-11-18

## 2024-12-12 ENCOUNTER — PATIENT MESSAGE (OUTPATIENT)
Dept: INTERNAL MEDICINE | Facility: CLINIC | Age: 54
End: 2024-12-12
Payer: COMMERCIAL

## 2024-12-13 RX ORDER — VALACYCLOVIR HYDROCHLORIDE 1 G/1
1000 TABLET, FILM COATED ORAL 2 TIMES DAILY
Qty: 14 TABLET | Refills: 0 | Status: SHIPPED | OUTPATIENT
Start: 2024-12-13

## 2024-12-21 DIAGNOSIS — F32.A ANXIETY AND DEPRESSION: ICD-10-CM

## 2024-12-21 DIAGNOSIS — F41.9 ANXIETY AND DEPRESSION: ICD-10-CM

## 2024-12-21 DIAGNOSIS — I10 HYPERTENSION, UNSPECIFIED TYPE: ICD-10-CM

## 2024-12-22 NOTE — TELEPHONE ENCOUNTER
Refill Routing Note   Medication(s) are not appropriate for processing by [facility] for the following reason(s):        Non-participating provider    [facility] action(s):  Route             Appointments  past 12m or future 3m with PCP    Date Provider   Last Visit   11/8/2024 Caitlin King NP   Next Visit   Visit date not found Caitlin King NP   ED visits in past 90 days: 0        Note composed:1:07 PM 12/22/2024

## 2024-12-23 RX ORDER — LOSARTAN POTASSIUM 25 MG/1
25 TABLET ORAL
Qty: 90 TABLET | Refills: 0 | Status: SHIPPED | OUTPATIENT
Start: 2024-12-23

## 2024-12-23 RX ORDER — PROPRANOLOL HYDROCHLORIDE 10 MG/1
10 TABLET ORAL
Qty: 90 TABLET | Refills: 0 | Status: SHIPPED | OUTPATIENT
Start: 2024-12-23

## 2024-12-23 RX ORDER — SERTRALINE HYDROCHLORIDE 100 MG/1
TABLET, FILM COATED ORAL
Qty: 30 TABLET | Refills: 0 | Status: SHIPPED | OUTPATIENT
Start: 2024-12-23

## 2025-01-08 ENCOUNTER — PATIENT MESSAGE (OUTPATIENT)
Dept: INTERNAL MEDICINE | Facility: CLINIC | Age: 55
End: 2025-01-08
Payer: COMMERCIAL

## 2025-01-08 DIAGNOSIS — L71.0 PERIORAL DERMATITIS: Primary | ICD-10-CM

## 2025-01-09 RX ORDER — DOXYCYCLINE HYCLATE 100 MG
100 TABLET ORAL 2 TIMES DAILY
Qty: 14 TABLET | Refills: 0 | Status: SHIPPED | OUTPATIENT
Start: 2025-01-09

## 2025-01-18 DIAGNOSIS — F32.A ANXIETY AND DEPRESSION: ICD-10-CM

## 2025-01-18 DIAGNOSIS — I10 HYPERTENSION, UNSPECIFIED TYPE: ICD-10-CM

## 2025-01-18 DIAGNOSIS — F41.9 ANXIETY AND DEPRESSION: ICD-10-CM

## 2025-01-18 RX ORDER — SERTRALINE HYDROCHLORIDE 100 MG/1
TABLET, FILM COATED ORAL
Qty: 30 TABLET | Refills: 3 | Status: SHIPPED | OUTPATIENT
Start: 2025-01-18

## 2025-01-18 RX ORDER — LOSARTAN POTASSIUM 25 MG/1
25 TABLET ORAL
Qty: 90 TABLET | Refills: 2 | Status: SHIPPED | OUTPATIENT
Start: 2025-01-18

## 2025-01-18 RX ORDER — PROPRANOLOL HYDROCHLORIDE 10 MG/1
10 TABLET ORAL
Qty: 90 TABLET | Refills: 2 | Status: SHIPPED | OUTPATIENT
Start: 2025-01-18

## 2025-01-24 RX ORDER — ESTRADIOL 2 MG/1
2 TABLET ORAL DAILY
Qty: 30 TABLET | Refills: 11 | Status: CANCELLED | OUTPATIENT
Start: 2025-01-24 | End: 2026-01-24

## 2025-01-25 NOTE — TELEPHONE ENCOUNTER
Prior authorization has been initiated through [employer]. Authorization is currently pending approval by insurance company.May take 24-72 hours for your insurance to respond

## 2025-01-27 RX ORDER — ESTRADIOL 2 MG/1
2 TABLET ORAL DAILY
Qty: 90 TABLET | Refills: 0 | Status: SHIPPED | OUTPATIENT
Start: 2025-01-27 | End: 2026-01-27

## 2025-01-28 RX ORDER — PROGESTERONE 200 MG/1
200 CAPSULE ORAL NIGHTLY
Qty: 90 CAPSULE | Refills: 0 | Status: SHIPPED | OUTPATIENT
Start: 2025-01-28 | End: 2026-01-28

## 2025-02-06 ENCOUNTER — HOSPITAL ENCOUNTER (OUTPATIENT)
Dept: RADIOLOGY | Facility: HOSPITAL | Age: 55
Discharge: HOME | End: 2025-02-06
Attending: NURSE PRACTITIONER
Payer: COMMERCIAL

## 2025-02-06 VITALS — BODY MASS INDEX: 28.17 KG/M2 | WEIGHT: 159 LBS | HEIGHT: 63 IN

## 2025-02-06 DIAGNOSIS — Z12.31 ENCOUNTER FOR SCREENING MAMMOGRAM FOR BREAST CANCER: ICD-10-CM

## 2025-02-06 PROCEDURE — 77067 SCR MAMMO BI INCL CAD: CPT | Mod: TC

## 2025-02-06 PROCEDURE — 77063 BREAST TOMOSYNTHESIS BI: CPT | Mod: 26,,, | Performed by: RADIOLOGY

## 2025-02-06 PROCEDURE — 77067 SCR MAMMO BI INCL CAD: CPT | Mod: 26,,, | Performed by: RADIOLOGY

## 2025-02-27 ENCOUNTER — OFFICE VISIT (OUTPATIENT)
Dept: OBSTETRICS AND GYNECOLOGY | Facility: CLINIC | Age: 55
End: 2025-02-27
Payer: COMMERCIAL

## 2025-02-27 VITALS
DIASTOLIC BLOOD PRESSURE: 80 MMHG | BODY MASS INDEX: 28.31 KG/M2 | SYSTOLIC BLOOD PRESSURE: 138 MMHG | WEIGHT: 159.81 LBS

## 2025-02-27 DIAGNOSIS — Z01.419 ENCOUNTER FOR WELL WOMAN EXAM WITH ROUTINE GYNECOLOGICAL EXAM: Primary | ICD-10-CM

## 2025-02-27 DIAGNOSIS — B37.2 SKIN CANDIDIASIS: ICD-10-CM

## 2025-02-27 DIAGNOSIS — Z12.31 SCREENING MAMMOGRAM FOR BREAST CANCER: ICD-10-CM

## 2025-02-27 PROCEDURE — 1159F MED LIST DOCD IN RCRD: CPT | Mod: CPTII,S$GLB,,

## 2025-02-27 PROCEDURE — 4010F ACE/ARB THERAPY RXD/TAKEN: CPT | Mod: CPTII,S$GLB,,

## 2025-02-27 PROCEDURE — 3079F DIAST BP 80-89 MM HG: CPT | Mod: CPTII,S$GLB,,

## 2025-02-27 PROCEDURE — 99999 PR PBB SHADOW E&M-EST. PATIENT-LVL IV: CPT | Mod: PBBFAC,,,

## 2025-02-27 PROCEDURE — 3008F BODY MASS INDEX DOCD: CPT | Mod: CPTII,S$GLB,,

## 2025-02-27 PROCEDURE — 3075F SYST BP GE 130 - 139MM HG: CPT | Mod: CPTII,S$GLB,,

## 2025-02-27 PROCEDURE — 99396 PREV VISIT EST AGE 40-64: CPT | Mod: S$GLB,,,

## 2025-02-27 RX ORDER — CLOTRIMAZOLE 1 %
CREAM (GRAM) TOPICAL
Qty: 30 G | Refills: 1 | Status: CANCELLED | OUTPATIENT
Start: 2025-02-27 | End: 2026-02-27

## 2025-02-28 ENCOUNTER — PATIENT MESSAGE (OUTPATIENT)
Dept: OBSTETRICS AND GYNECOLOGY | Facility: CLINIC | Age: 55
End: 2025-02-28
Payer: COMMERCIAL

## 2025-02-28 RX ORDER — DOXYLAMINE SUCCINATE 25 MG
TABLET ORAL
Qty: 15 G | Refills: 1 | Status: SHIPPED | OUTPATIENT
Start: 2025-02-28 | End: 2026-02-28

## 2025-03-12 NOTE — PROGRESS NOTES
Chief Complaint: Well Woman Exam     HPI:      Krystle Austin is a 54 y.o.  who presents today for well woman exam.  Hx of suprapubic hysterectomy.  No issues, problems, or complaints. Specifically, patient denies abnormal vaginal bleeding, discharge, pelvic pain, urinary problems, or changes in appetite. Ms. Austin is currently sexually active with a single male partner.   Lost 52 lbs! Still on weight loss shots and exercising.    Previous Pap: NILM, HPV negative (2024)  HRT hx:  Estradiol 2 mg daily, progesterone 200 mg nightly   Previous Mammogram: BiRads: 1 T-C Score: 3.92% (2025)  Most Recent Dexa: N/A  Most Recent Colonoscopy: Benign polyps (2023), Repeat in     FH:  Breast cancer: Cousin  Colon cancer: none  Ovarian cancer: none  Endometrial cancer: none    Ms. Austin confirms that she wears her seatbelt when riding in the car and does not text while driving.     OB History          2    Para   2    Term   2            AB        Living   2         SAB        IAB        Ectopic        Multiple        Live Births                     ROS:     GENERAL: Denies unintentional weight gain or weight loss. Feeling well overall.   SKIN: Denies rash or lesions.   HEENT: Denies headaches, or vision changes.   CARDIOVASCULAR: Denies palpitations or chest pain.   RESPIRATORY: Denies shortness of breath or dyspnea on exertion.  BREASTS: Denies lumps or nipple discharge.   ABDOMEN: Denies constipation, diarrhea, nausea, vomiting, change in appetite.  URINARY: Denies frequency, dysuria.  NEUROLOGIC: Denies syncope or weakness.   PSYCHIATRIC: Denies uncontrolled depression or anxiety.    Physical Exam:      PHYSICAL EXAM:  /80   Wt 72.5 kg (159 lb 13.3 oz)   BMI 28.31 kg/m²   Body mass index is 28.31 kg/m².     APPEARANCE: Well nourished, well developed, in no acute distress.  PSYCH: Appropriate mood and affect.  SKIN: No acne or hirsutism  NECK: Neck symmetric without  masses  NODES: No inguinal, axillary, or supraclavicular lymph node enlargement  ABDOMEN: Soft.  No tenderness or masses.    CARDIOVASCULAR: No edema of peripheral extremities  BREASTS: Symmetrical, no visible skin lesions. No palpable masses. No nipple discharge bilaterally. Bilateral erythema in inframammary folds.  PELVIC: Normal external genitalia without lesions.  Normal hair distribution.  Adequate perineal body, normal urethral meatus.  Vagina moist and well rugated. Without lesions. Vagina without discharge.  Cervix pink, without lesions, discharge or tenderness.  No significant cystocele or rectocele.  Bimanual exam shows no midline or adnexal masses.  Uterus absent.  Gyn note:      A female chaperone was present for the breast and pelvic exam.     Assessment/Plan:     Encounter for well woman exam with routine gynecological exam  -     Counseled patient regarding healthy diet and regular exercise, daily multivitamin, daily seat belt use.   -     BP normotensive  -     She denies abuse and feels safe at home.  -     Pap smear:  UTD, repeat in 2027  -     MMG:  UTD (next due next year, orders placed)  -     Colon Cancer Screening: UTD    -     DEXA screening:  no indication due to age (<64y/o) and no additional risk factors e.g. previous fragility facture, glucocorticoid use, parental history of hip fracture, low body weight (<127 lbs), current cigarette use, excessive alcohol consumption, rheumatoid arthritis, secondary osteoporosis (malabsorption, inflammatory bowel disease, chronic liver disease).    Skin candidiasis  -     miconazole (MICATIN) 2 % cream; Apply to affected area 2 times daily  Dispense: 15 g; Refill: 1    Screening mammogram for breast cancer  -     Mammo Digital Screening Bilat w/ Christian (XPD); Future; Expected date: 02/07/2026    Follow up in about 1 year for annual exam or sooner PRN.    Counseling:     Patient was counseled today on current ASCCP pap guidelines, the recommendation for  yearly physical exams, healthy diet and exercise routines, safe driving habits, and breast self awareness and annual mammograms. She is to see her PCP for other health maintenance.     Use of the [facility] Patient Portal discussed and encouraged during today's visit.   Counseling time: 15 minutes    Mariely Lauren (Maggie), JUSTIN  Obstetrics and Gynecology  [facility]

## 2025-04-28 RX ORDER — ESTRADIOL 2 MG/1
2 TABLET ORAL DAILY
Qty: 30 TABLET | Refills: 11 | Status: SHIPPED | OUTPATIENT
Start: 2025-04-28 | End: 2026-04-28

## 2025-05-06 RX ORDER — PROGESTERONE 200 MG/1
200 CAPSULE ORAL NIGHTLY
Qty: 90 CAPSULE | Refills: 2 | Status: SHIPPED | OUTPATIENT
Start: 2025-05-06 | End: 2026-05-06

## 2025-05-27 DIAGNOSIS — F32.A ANXIETY AND DEPRESSION: ICD-10-CM

## 2025-05-27 DIAGNOSIS — F41.9 ANXIETY AND DEPRESSION: ICD-10-CM

## 2025-05-27 DIAGNOSIS — I10 HYPERTENSION, UNSPECIFIED TYPE: ICD-10-CM

## 2025-05-27 RX ORDER — LOSARTAN POTASSIUM 25 MG/1
25 TABLET ORAL
Qty: 90 TABLET | Refills: 1 | Status: SHIPPED | OUTPATIENT
Start: 2025-05-27

## 2025-05-27 RX ORDER — SERTRALINE HYDROCHLORIDE 100 MG/1
100 TABLET, FILM COATED ORAL
Qty: 30 TABLET | Refills: 3 | Status: SHIPPED | OUTPATIENT
Start: 2025-05-27

## 2025-05-27 RX ORDER — PROPRANOLOL HYDROCHLORIDE 10 MG/1
10 TABLET ORAL
Qty: 90 TABLET | Refills: 1 | Status: SHIPPED | OUTPATIENT
Start: 2025-05-27

## 2025-05-31 ENCOUNTER — OFFICE VISIT (OUTPATIENT)
Dept: URGENT CARE | Facility: CLINIC | Age: 55
End: 2025-05-31
Payer: COMMERCIAL

## 2025-05-31 VITALS
HEART RATE: 77 BPM | BODY MASS INDEX: 28.17 KG/M2 | HEIGHT: 63 IN | DIASTOLIC BLOOD PRESSURE: 82 MMHG | TEMPERATURE: 98 F | SYSTOLIC BLOOD PRESSURE: 122 MMHG | WEIGHT: 159 LBS | RESPIRATION RATE: 16 BRPM | OXYGEN SATURATION: 95 %

## 2025-05-31 DIAGNOSIS — M19.041 OSTEOARTHRITIS OF RIGHT HAND, UNSPECIFIED OSTEOARTHRITIS TYPE: Primary | ICD-10-CM

## 2025-05-31 PROCEDURE — 99213 OFFICE O/P EST LOW 20 MIN: CPT | Mod: 25,S$GLB,, | Performed by: PHYSICIAN ASSISTANT

## 2025-05-31 PROCEDURE — 73130 X-RAY EXAM OF HAND: CPT | Mod: FY,RT,S$GLB, | Performed by: RADIOLOGY

## 2025-05-31 PROCEDURE — 96372 THER/PROPH/DIAG INJ SC/IM: CPT | Mod: 59,S$GLB,, | Performed by: FAMILY MEDICINE

## 2025-05-31 RX ORDER — KETOROLAC TROMETHAMINE 30 MG/ML
30 INJECTION, SOLUTION INTRAMUSCULAR; INTRAVENOUS
Status: COMPLETED | OUTPATIENT
Start: 2025-05-31 | End: 2025-05-31

## 2025-05-31 RX ORDER — MELOXICAM 15 MG/1
15 TABLET ORAL DAILY
Qty: 7 TABLET | Refills: 0 | Status: SHIPPED | OUTPATIENT
Start: 2025-06-01 | End: 2025-06-08

## 2025-05-31 RX ADMIN — KETOROLAC TROMETHAMINE 30 MG: 30 INJECTION, SOLUTION INTRAMUSCULAR; INTRAVENOUS at 04:05

## 2025-05-31 NOTE — PROGRESS NOTES
"Subjective:      Patient ID: Krystle Austin is a 54 y.o. female.    Vitals:  height is 5' 3" (1.6 m) and weight is 72.1 kg (159 lb). Her oral temperature is 98 °F (36.7 °C). Her blood pressure is 122/82 and her pulse is 77. Her respiration is 16 and oxygen saturation is 95%.     Chief Complaint: Hand Pain    Pt states pt states right hand pain, mainly the thumb and pointer finger. Pain has been going on for 1 week, no known injury.  Two months ago she did had trauma to that hand but it has not been hurting since then.  Rates her discomfort at 8/10.  Tried Tylenol, Biofreeze and ice with little relief.  Does work with computers on a daily basis no numbness tingling decreased range of motion or weakness    Hand Pain   Her dominant hand is their right hand. The incident occurred more than 1 week ago. There was no injury mechanism. Pertinent negatives include no numbness.       Musculoskeletal:  Positive for pain, trauma, joint pain and joint swelling. Negative for abnormal ROM of joint and muscle ache.   Skin:  Negative for rash, erythema and bruising.   Neurological:  Negative for numbness, tingling and tremors.      Past Medical History:   Diagnosis Date    Endometriosis     Hypertension     PONV (postoperative nausea and vomiting)        Past Surgical History:   Procedure Laterality Date    BUNIONECTOMY Right 07/30/2020    Procedure: BUNIONECTOMY;  Surgeon: Rajinder Muir DPM;  Location: [address];  Service: Podiatry;  Laterality: Right;    COLONOSCOPY N/A 3/28/2023    Procedure: COLONOSCOPY;  Surgeon: Ervin Loya MD;  Location: [facility] ([facility] [place]);  Service: Endoscopy;  Laterality: N/A;  instr portal-GT  pre call done 3/22 -     HYSTERECTOMY  05/2008    KNEE CARTILAGE SURGERY Left     ME[place]HA[place] BUNIONECTOMY Right 07/30/2020    Procedure: EXCISION, BUNIONETTE;  Surgeon: Rajinder Muir DPM;  Location: [address];  Service: Podiatry;  Laterality: Right;    TONSILLECTOMY  All I can remember is I " was in 4th grade.       Family History   Problem Relation Name Age of Onset    Cancer Mother Annette Pierce         lung     COPD Mother Annette Pierce 61        lung     Miscarriages / Stillbirths Sister Melonie Valle     COPD Maternal Aunt Maryann Barnes     COPD Maternal Uncle Black Castaneda     Alcohol abuse Maternal Grandmother Abigail Petty     Cancer Maternal Grandmother Abigail Petty         unsure - metastatic     Asthma Son Drake Austin     Learning disabilities Son Adam Austin     Breast cancer Cousin         Social History     Socioeconomic History    Marital status:    Tobacco Use    Smoking status: Never    Smokeless tobacco: Never   Substance and Sexual Activity    Alcohol use: Yes     Alcohol/week: 2.0 standard drinks of alcohol     Types: 2 Cans of beer per week     Comment: occasionally    Drug use: Never    Sexual activity: Yes     Partners: Male     Birth control/protection: None     Social Drivers of Health     Financial Resource Strain: Low Risk  (5/19/2024)    Overall Financial Resource Strain (CARDIA)     Difficulty of Paying Living Expenses: Not hard at all   Food Insecurity: No Food Insecurity (5/19/2024)    Hunger Vital Sign     Worried About Running Out of Food in the Last Year: Never true     Ran Out of Food in the Last Year: Never true   Transportation Needs: No Transportation Needs (11/5/2023)    PRAPARE - Transportation     Lack of Transportation (Medical): No     Lack of Transportation (Non-Medical): No   Physical Activity: Sufficiently Active (5/19/2024)    Exercise Vital Sign     Days of Exercise per Week: 7 days     Minutes of Exercise per Session: 60 min   Stress: No Stress Concern Present (5/19/2024)    [employer] - Occupational Stress Questionnaire     Feeling of Stress : Not at all   Housing Stability: Low Risk  (11/5/2023)    Housing Stability Vital Sign     Unable to Pay for Housing in the Last Year: No     Number of Places Lived in  the Last Year: 1     Unstable Housing in the Last Year: No       Current Medications[1]    Review of patient's allergies indicates:   Allergen Reactions    Oxycodone-acetaminophen Hives       Objective:     Physical Exam   Constitutional:  Non-toxic appearance. She does not appear ill. No distress.   Cardiovascular: Normal rate, regular rhythm and normal pulses.   Pulmonary/Chest: Effort normal. No respiratory distress.   Abdominal: Normal appearance.   Musculoskeletal: Normal range of motion.         General: Swelling and tenderness present. No deformity. Normal range of motion.      Right wrist: Normal.        Hands:    Neurological: She is alert.   Skin: Skin is warm, dry, not diaphoretic, not pale and no rash. Capillary refill takes less than 2 seconds. No bruising and No erythema   Nursing note and vitals reviewed.    X-Ray Hand 3 view Right  Result Date: 5/31/2025  EXAMINATION: XR HAND COMPLETE 3 VIEW RIGHT CLINICAL HISTORY: Pain in right hand TECHNIQUE: PA, lateral, and oblique views of the right hand were performed. COMPARISON: None FINDINGS: No fracture or dislocation.  There is mild diffuse interphalangeal joint osteoarthritis.  Mild 1st carpometacarpal osteoarthritis.  Mild 1st MCP osteoarthritis.     Degenerative change. Electronically signed by: Christiano Palma Date:    05/31/2025 Time:    16:08      Assessment:     1. Osteoarthritis of right hand, unspecified osteoarthritis type        Plan:       Osteoarthritis of right hand, unspecified osteoarthritis type  -     X-Ray Hand 3 view Right  -     ketorolac injection 30 mg  -     meloxicam (MOBIC) 15 MG tablet; Take 1 tablet (15 mg total) by mouth once daily. for 7 days  Dispense: 7 tablet; Refill: 0        I have reviewed the patient chart and pertinent past imaging/labs.     Results reviewed, pt notified      Patient Instructions   You were given a Toradol injection today.  Do not start Mobic until tomorrow, take with food.  As we discussed do not take  Advil or ibuprofen with either these medications Tylenol is safe.  Ice for 15 minutes at a time 4 to 5 times a day.  Elevate as tolerated.  Follow up with primary care if symptoms persist           [1]   Current Outpatient Medications   Medication Sig Dispense Refill    APPLE CIDER VINEGAR ORAL Take by mouth.      atorvastatin (LIPITOR) 10 MG tablet Take 1 tablet (10 mg total) by mouth once daily. 90 tablet 3    azelastine (ASTELIN) 137 mcg (0.1 %) nasal spray SMARTSIG:Both Nares      calcium carbonate (OS-STEFANIE) 600 mg calcium (1,500 mg) Tab Take 600 mg by mouth once.      cyanocobalamin (VITAMIN B-12) 500 MCG tablet Take 500 mcg by mouth once daily.      estradioL (ESTRACE) 2 MG tablet Take 1 tablet (2 mg total) by mouth once daily. 90 tablet 0    estradioL (ESTRACE) 2 MG tablet Take 1 tablet (2 mg total) by mouth once daily. 30 tablet 11    losartan (COZAAR) 25 MG tablet TAKE ONE TABLET BY MOUTH EVERY DAY 90 tablet 1    miconazole (MICATIN) 2 % cream Apply to affected area 2 times daily 15 g 1    multivit-min/iron/folic/zsn952 (HAIR, SKIN AND NAILS ADVANCED ORAL) Take by mouth.      multivitamin with minerals tablet Take 1 tablet by mouth once daily.      progesterone (PROMETRIUM) 200 MG capsule Take 1 capsule (200 mg total) by mouth every evening. 90 capsule 2    propranoloL (INDERAL) 10 MG tablet TAKE ONE TABLET BY MOUTH EVERY DAY 90 tablet 1    sertraline (ZOLOFT) 100 MG tablet TAKE ONE TABLET BY MOUTH EVERY DAY 30 tablet 3    tirzepatide 15 mg/0.5 mL PnIj Inject 15 mg into the skin.      valACYclovir (VALTREX) 1000 MG tablet Take 1 tablet (1,000 mg total) by mouth 2 (two) times daily. 14 tablet 0    pantoprazole (PROTONIX) 40 MG tablet Take 1 tablet (40 mg total) by mouth once daily. 30 tablet 11    sumatriptan (IMITREX) 25 MG Tab Take 1 tablet (25 mg total) by mouth every 2 (two) hours as needed (migraine). 8 tablet 0     Current Facility-Administered Medications   Medication Dose Route Frequency Provider Last  Rate Last Admin    triamcinolone acetonide injection 10 mg  10 mg Intradermal Once

## 2025-05-31 NOTE — PATIENT INSTRUCTIONS
You were given a Toradol injection today.  Do not start Mobic until tomorrow, take with food.  As we discussed do not take Advil or ibuprofen with either these medications Tylenol is safe.  Ice for 15 minutes at a time 4 to 5 times a day.  Elevate as tolerated.  Follow up with primary care if symptoms persist

## 2025-06-07 DIAGNOSIS — I70.90 ATHEROSCLEROSIS: ICD-10-CM

## 2025-06-09 RX ORDER — ATORVASTATIN CALCIUM 10 MG/1
10 TABLET, FILM COATED ORAL DAILY
Qty: 90 TABLET | Refills: 0 | Status: SHIPPED | OUTPATIENT
Start: 2025-06-09 | End: 2026-06-09

## 2025-07-16 ENCOUNTER — PATIENT MESSAGE (OUTPATIENT)
Dept: INTERNAL MEDICINE | Facility: CLINIC | Age: 55
End: 2025-07-16
Payer: COMMERCIAL

## 2025-07-16 ENCOUNTER — HOSPITAL ENCOUNTER (OUTPATIENT)
Dept: RADIOLOGY | Facility: HOSPITAL | Age: 55
Discharge: HOME | End: 2025-07-16
Attending: PODIATRIST
Payer: COMMERCIAL

## 2025-07-16 ENCOUNTER — PATIENT MESSAGE (OUTPATIENT)
Dept: PODIATRY | Facility: CLINIC | Age: 55
End: 2025-07-16

## 2025-07-16 ENCOUNTER — OFFICE VISIT (OUTPATIENT)
Dept: PODIATRY | Facility: CLINIC | Age: 55
End: 2025-07-16
Payer: COMMERCIAL

## 2025-07-16 VITALS
HEART RATE: 66 BPM | DIASTOLIC BLOOD PRESSURE: 85 MMHG | HEIGHT: 63 IN | BODY MASS INDEX: 27.54 KG/M2 | WEIGHT: 155.44 LBS | SYSTOLIC BLOOD PRESSURE: 144 MMHG

## 2025-07-16 DIAGNOSIS — M21.622 TAILOR'S BUNION OF LEFT FOOT: Primary | ICD-10-CM

## 2025-07-16 DIAGNOSIS — M21.622 TAILOR'S BUNION OF LEFT FOOT: ICD-10-CM

## 2025-07-16 PROCEDURE — 1159F MED LIST DOCD IN RCRD: CPT | Mod: CPTII,S$GLB,, | Performed by: PODIATRIST

## 2025-07-16 PROCEDURE — 3079F DIAST BP 80-89 MM HG: CPT | Mod: CPTII,S$GLB,, | Performed by: PODIATRIST

## 2025-07-16 PROCEDURE — 99214 OFFICE O/P EST MOD 30 MIN: CPT | Mod: S$GLB,,, | Performed by: PODIATRIST

## 2025-07-16 PROCEDURE — 73630 X-RAY EXAM OF FOOT: CPT | Mod: TC,LT

## 2025-07-16 PROCEDURE — 4010F ACE/ARB THERAPY RXD/TAKEN: CPT | Mod: CPTII,S$GLB,, | Performed by: PODIATRIST

## 2025-07-16 PROCEDURE — 3008F BODY MASS INDEX DOCD: CPT | Mod: CPTII,S$GLB,, | Performed by: PODIATRIST

## 2025-07-16 PROCEDURE — 3077F SYST BP >= 140 MM HG: CPT | Mod: CPTII,S$GLB,, | Performed by: PODIATRIST

## 2025-07-16 PROCEDURE — 73630 X-RAY EXAM OF FOOT: CPT | Mod: 26,LT,, | Performed by: RADIOLOGY

## 2025-07-16 PROCEDURE — 99999 PR PBB SHADOW E&M-EST. PATIENT-LVL V: CPT | Mod: PBBFAC,,, | Performed by: PODIATRIST

## 2025-07-16 NOTE — PROGRESS NOTES
Subjective:      Patient ID: Krystle Austin is a 54 y.o. female.    Chief Complaint: Foot Pain (Left Foot Pain) and Callouses (Right Foot )    Krystle is a 54 y.o. female who presents to the podiatry clinic  with complaint of  left foot pain. Onset of the symptoms was several months ago. Precipitating event: none known. Current symptoms include: ability to bear weight, but with some pain. Aggravating factors: any weight bearing. Symptoms have gradually improved. Patient has had no prior foot problems. Evaluation to date: none. Treatment to date: none. Patients rates pain 5/10 on pain scale.    Review of Systems   Constitutional: Negative for chills, fever and malaise/fatigue.   HENT:  Negative for hearing loss.    Cardiovascular:  Negative for claudication.   Respiratory:  Negative for shortness of breath.    Skin:  Negative for flushing and rash.   Musculoskeletal:  Negative for joint pain and myalgias.   Neurological:  Negative for loss of balance, numbness, paresthesias and sensory change.   Psychiatric/Behavioral:  Negative for altered mental status.          Objective:      Physical Exam  Vitals reviewed.   Cardiovascular:      Pulses:           Dorsalis pedis pulses are 2+ on the right side and 2+ on the left side.        Posterior tibial pulses are 2+ on the right side and 2+ on the left side.      Comments: No edema noted b/L  Musculoskeletal:         General: Tenderness and deformity present.      Comments:     Prominent 5th met head/Tailor's bunion deformity noted to left lower extremities         Feet:      Right foot:      Protective Sensation: 5 sites tested.  5 sites sensed.      Left foot:      Protective Sensation: 5 sites tested.  5 sites sensed.   Skin:     General: Skin is warm.      Capillary Refill: Capillary refill takes 2 to 3 seconds.      Comments: Normal skin tugor noted.   No open lesion noted b/L  Skin temp is warm to warm from proximal to distal b/L.  Webspaces clean, dry, and  intact  HKL noted to sub 5th met, right  Cicatrix noted to right foot x2   Neurological:      Mental Status: She is alert.      Comments: Gross sensation intact b/L           Assessment:       Encounter Diagnosis   Name Primary?    Tailor's bunion of left foot Yes         Plan:       Krystle was seen today for foot pain and callouses.    Diagnoses and all orders for this visit:    Tailor's bunion of left foot  -     X-Ray Foot Complete Left; Future  -     Case Request Operating Room: EXCISION, BUNIONETTE      I counseled the patient on her conditions, their implications and medical management.      Pt had surgery on right foot to remove tailor's bunion, successful.   Would like sx on left foot    X-rays taken, procedure explained in detail to pt    Case request for 8/8 placed  All questions answered, surgical folder given to pt  I spent a total of 35 minutes on the day of the visit.This includes face to face time and non-face to face time preparing to see the patient (eg, review of tests), obtaining and/or reviewing separately obtained history, documenting clinical information in the electronic or other health record, independently interpreting results and communicating results to the patient/family/caregiver, or care coordinator.      Pt advised she will need clearance from PCP  .

## 2025-07-23 ENCOUNTER — TELEPHONE (OUTPATIENT)
Dept: PODIATRY | Facility: CLINIC | Age: 55
End: 2025-07-23
Payer: COMMERCIAL

## 2025-08-10 ENCOUNTER — OFFICE VISIT (OUTPATIENT)
Dept: URGENT CARE | Facility: CLINIC | Age: 55
End: 2025-08-10
Payer: COMMERCIAL

## 2025-08-10 VITALS
OXYGEN SATURATION: 98 % | DIASTOLIC BLOOD PRESSURE: 88 MMHG | SYSTOLIC BLOOD PRESSURE: 137 MMHG | HEIGHT: 63 IN | TEMPERATURE: 98 F | RESPIRATION RATE: 16 BRPM | WEIGHT: 155 LBS | HEART RATE: 73 BPM | BODY MASS INDEX: 27.46 KG/M2

## 2025-08-10 DIAGNOSIS — M25.561 ACUTE PAIN OF RIGHT KNEE: Primary | ICD-10-CM

## 2025-08-10 PROCEDURE — 99214 OFFICE O/P EST MOD 30 MIN: CPT | Mod: 25,S$GLB,, | Performed by: NURSE PRACTITIONER

## 2025-08-10 PROCEDURE — 73562 X-RAY EXAM OF KNEE 3: CPT | Mod: RT,S$GLB,, | Performed by: RADIOLOGY

## 2025-08-10 PROCEDURE — 96372 THER/PROPH/DIAG INJ SC/IM: CPT | Mod: S$GLB,,, | Performed by: NURSE PRACTITIONER

## 2025-08-10 RX ORDER — KETOROLAC TROMETHAMINE 30 MG/ML
30 INJECTION, SOLUTION INTRAMUSCULAR; INTRAVENOUS
Status: COMPLETED | OUTPATIENT
Start: 2025-08-10 | End: 2025-08-10

## 2025-08-10 RX ORDER — NAPROXEN 500 MG/1
500 TABLET ORAL 2 TIMES DAILY PRN
Qty: 30 TABLET | Refills: 0 | Status: SHIPPED | OUTPATIENT
Start: 2025-08-10

## 2025-08-10 RX ORDER — CYCLOBENZAPRINE HCL 10 MG
10 TABLET ORAL 3 TIMES DAILY PRN
Qty: 20 TABLET | Refills: 0 | Status: SHIPPED | OUTPATIENT
Start: 2025-08-10 | End: 2025-08-20

## 2025-08-10 RX ADMIN — KETOROLAC TROMETHAMINE 30 MG: 30 INJECTION, SOLUTION INTRAMUSCULAR; INTRAVENOUS at 11:08

## (undated) DEVICE — ELECTRODE REM PLYHSV RETURN 9

## (undated) DEVICE — SEE MEDLINE ITEM 146298

## (undated) DEVICE — NDL 18GA X1 1/2 REG BEVEL

## (undated) DEVICE — BLADE SURG #15 CARBON STEEL

## (undated) DEVICE — DRESSING XEROFORM FOIL PK 1X8

## (undated) DEVICE — NDL HYPO REG 25G X 1 1/2

## (undated) DEVICE — STOCKINET TUBULAR 1 PLY 6X60IN

## (undated) DEVICE — SEE MEDLINE ITEM 152515

## (undated) DEVICE — SEE MEDLINE ITEM 152522

## (undated) DEVICE — SPONGE GAUZE 16PLY 4X4

## (undated) DEVICE — SYR 10CC LUER LOCK

## (undated) DEVICE — TRAY MINOR ORTHO

## (undated) DEVICE — ADHESIVE MASTISOL VIAL 48/BX

## (undated) DEVICE — CLOSURE SKIN STERI STRIP 1/2X4

## (undated) DEVICE — DRAPE EXTREMITY FULL FABRIC

## (undated) DEVICE — SEE MEDLINE ITEM 157150

## (undated) DEVICE — BLADE SAGITTA 5/BX

## (undated) DEVICE — BANDAGE DERMACEA STRETCH 4X1IN